# Patient Record
Sex: MALE | Race: BLACK OR AFRICAN AMERICAN | Employment: UNEMPLOYED | URBAN - METROPOLITAN AREA
[De-identification: names, ages, dates, MRNs, and addresses within clinical notes are randomized per-mention and may not be internally consistent; named-entity substitution may affect disease eponyms.]

---

## 2018-12-18 ENCOUNTER — HOSPITAL ENCOUNTER (OUTPATIENT)
Dept: LAB | Age: 68
Discharge: HOME | End: 2018-12-18

## 2018-12-18 LAB — POTASSIUM SERPL-SCNC: 4 MMOL/L (ref 3.5–5.1)

## 2018-12-18 PROCEDURE — 84132 ASSAY OF SERUM POTASSIUM: CPT

## 2019-01-31 RX ORDER — MELATONIN
1000 DAILY
COMMUNITY
End: 2019-04-30

## 2019-01-31 RX ORDER — MIDODRINE HYDROCHLORIDE 10 MG/1
10 TABLET ORAL 2 TIMES DAILY WITH MEALS
COMMUNITY
End: 2019-04-30

## 2019-01-31 RX ORDER — ATORVASTATIN CALCIUM 10 MG/1
20 TABLET, FILM COATED ORAL
COMMUNITY

## 2019-01-31 RX ORDER — ERGOCALCIFEROL 1.25 MG/1
50000 CAPSULE ORAL
COMMUNITY
End: 2019-04-30

## 2019-01-31 RX ORDER — MENTHOL AND ZINC OXIDE .44; 20.625 G/100G; G/100G
OINTMENT TOPICAL
COMMUNITY
End: 2020-09-16 | Stop reason: CLARIF

## 2019-01-31 RX ORDER — LOPERAMIDE HYDROCHLORIDE 2 MG/1
2 CAPSULE ORAL
COMMUNITY
End: 2021-01-01

## 2019-01-31 RX ORDER — DOCUSATE SODIUM 100 MG/1
100 CAPSULE, LIQUID FILLED ORAL DAILY
COMMUNITY
End: 2019-04-30

## 2019-01-31 RX ORDER — GABAPENTIN 100 MG/1
100 CAPSULE ORAL 2 TIMES DAILY
COMMUNITY
End: 2020-09-16 | Stop reason: CLARIF

## 2019-01-31 RX ORDER — SIMETHICONE 80 MG
40 TABLET,CHEWABLE ORAL 2 TIMES DAILY
COMMUNITY
End: 2020-09-16 | Stop reason: CLARIF

## 2019-01-31 RX ORDER — ONDANSETRON 4 MG/1
4 TABLET, FILM COATED ORAL
COMMUNITY
End: 2021-01-01

## 2019-01-31 RX ORDER — ACETAMINOPHEN 500 MG
1000 TABLET ORAL
COMMUNITY
End: 2021-01-01

## 2019-01-31 NOTE — PERIOP NOTES
Received med list from [facility]. Meds entered in cc. [address] pre-op instruction sheet faxed to May at [address] [place] [address]. Fax #  [phone]. Confirmation received.

## 2019-02-01 ENCOUNTER — HOSPITAL ENCOUNTER (OUTPATIENT)
Age: 69
Setting detail: OUTPATIENT SURGERY
Discharge: NURSING, REHAB OR LONG-TERM CARE | End: 2019-02-01
Payer: MEDICARE

## 2019-02-01 ENCOUNTER — ANESTHESIA EVENT (OUTPATIENT)
Dept: SURGERY | Age: 69
End: 2019-02-01
Payer: MEDICARE

## 2019-02-01 ENCOUNTER — ANESTHESIA (OUTPATIENT)
Dept: SURGERY | Age: 69
End: 2019-02-01
Payer: MEDICARE

## 2019-02-01 VITALS
SYSTOLIC BLOOD PRESSURE: 115 MMHG | DIASTOLIC BLOOD PRESSURE: 55 MMHG | HEART RATE: 79 BPM | RESPIRATION RATE: 18 BRPM | HEIGHT: 68 IN | OXYGEN SATURATION: 97 % | WEIGHT: 218 LBS | TEMPERATURE: 97.8 F | BODY MASS INDEX: 33.04 KG/M2

## 2019-02-01 DIAGNOSIS — Z99.2 STAGE 5 CHRONIC KIDNEY DISEASE ON CHRONIC DIALYSIS (HCC): Primary | ICD-10-CM

## 2019-02-01 DIAGNOSIS — N18.6 STAGE 5 CHRONIC KIDNEY DISEASE ON CHRONIC DIALYSIS (HCC): Primary | ICD-10-CM

## 2019-02-01 LAB
ANION GAP BLD CALC-SCNC: 21 MMOL/L (ref 10–20)
ATRIAL RATE: 80 BPM
BUN BLD-MCNC: 31 MG/DL (ref 9–20)
CA-I BLD-MCNC: 1 MMOL/L (ref 1.12–1.32)
CALCULATED P AXIS, ECG09: 52 DEGREES
CALCULATED R AXIS, ECG10: -26 DEGREES
CALCULATED T AXIS, ECG11: 40 DEGREES
CHLORIDE BLD-SCNC: 100 MMOL/L (ref 98–107)
CO2 BLD-SCNC: 22 MMOL/L (ref 21–32)
CREAT BLD-MCNC: 5.8 MG/DL (ref 0.6–1.3)
DIAGNOSIS, 93000: NORMAL
GLUCOSE BLD STRIP.AUTO-MCNC: 94 MG/DL (ref 65–100)
GLUCOSE BLD-MCNC: 112 MG/DL (ref 65–100)
HCT VFR BLD CALC: 37 % (ref 36.6–50.3)
P-R INTERVAL, ECG05: 188 MS
POTASSIUM BLD-SCNC: 3.3 MMOL/L (ref 3.5–5.1)
Q-T INTERVAL, ECG07: 404 MS
QRS DURATION, ECG06: 86 MS
QTC CALCULATION (BEZET), ECG08: 465 MS
SERVICE CMNT-IMP: ABNORMAL
SERVICE CMNT-IMP: NORMAL
SODIUM BLD-SCNC: 139 MMOL/L (ref 136–145)
VENTRICULAR RATE, ECG03: 80 BPM

## 2019-02-01 PROCEDURE — 77030002924 HC SUT GORTX WLGO -B

## 2019-02-01 PROCEDURE — 93005 ELECTROCARDIOGRAM TRACING: CPT

## 2019-02-01 PROCEDURE — 77030032490 HC SLV COMPR SCD KNE COVD -B

## 2019-02-01 PROCEDURE — 76010000149 HC OR TIME 1 TO 1.5 HR

## 2019-02-01 PROCEDURE — 74011250636 HC RX REV CODE- 250/636

## 2019-02-01 PROCEDURE — 77030011640 HC PAD GRND REM COVD -A

## 2019-02-01 PROCEDURE — 76210000021 HC REC RM PH II 0.5 TO 1 HR

## 2019-02-01 PROCEDURE — 77030003601 HC NDL NRV BLK BBMI -A

## 2019-02-01 PROCEDURE — 77030018846 HC SOL IRR STRL H20 ICUM -A

## 2019-02-01 PROCEDURE — 77030008467 HC STPLR SKN COVD -B

## 2019-02-01 PROCEDURE — 74011250636 HC RX REV CODE- 250/636: Performed by: ANESTHESIOLOGY

## 2019-02-01 PROCEDURE — 80047 BASIC METABLC PNL IONIZED CA: CPT

## 2019-02-01 PROCEDURE — 82962 GLUCOSE BLOOD TEST: CPT

## 2019-02-01 PROCEDURE — 77030020256 HC SOL INJ NACL 0.9%  500ML

## 2019-02-01 PROCEDURE — 77030031139 HC SUT VCRL2 J&J -A

## 2019-02-01 PROCEDURE — 77030020782 HC GWN BAIR PAWS FLX 3M -B

## 2019-02-01 PROCEDURE — C1768 GRAFT, VASCULAR: HCPCS

## 2019-02-01 PROCEDURE — 77030002996 HC SUT SLK J&J -A

## 2019-02-01 PROCEDURE — A4565 SLINGS: HCPCS

## 2019-02-01 PROCEDURE — 76060000033 HC ANESTHESIA 1 TO 1.5 HR

## 2019-02-01 PROCEDURE — 76210000006 HC OR PH I REC 0.5 TO 1 HR

## 2019-02-01 DEVICE — PROPATEN VASCULAR GRAFT TW 7MMX40CM HEPARIN
Type: IMPLANTABLE DEVICE | Site: ARM | Status: FUNCTIONAL
Brand: GORE PROPATEN VASCULAR GRAFT

## 2019-02-01 RX ORDER — PROPOFOL 10 MG/ML
INJECTION, EMULSION INTRAVENOUS
Status: DISCONTINUED | OUTPATIENT
Start: 2019-02-01 | End: 2019-02-01 | Stop reason: HOSPADM

## 2019-02-01 RX ORDER — PROPOFOL 10 MG/ML
INJECTION, EMULSION INTRAVENOUS AS NEEDED
Status: DISCONTINUED | OUTPATIENT
Start: 2019-02-01 | End: 2019-02-01 | Stop reason: HOSPADM

## 2019-02-01 RX ORDER — LIDOCAINE HYDROCHLORIDE 10 MG/ML
0.1 INJECTION, SOLUTION EPIDURAL; INFILTRATION; INTRACAUDAL; PERINEURAL AS NEEDED
Status: DISCONTINUED | OUTPATIENT
Start: 2019-02-01 | End: 2019-02-01 | Stop reason: HOSPADM

## 2019-02-01 RX ORDER — MIDAZOLAM HYDROCHLORIDE 1 MG/ML
1 INJECTION, SOLUTION INTRAMUSCULAR; INTRAVENOUS AS NEEDED
Status: DISCONTINUED | OUTPATIENT
Start: 2019-02-01 | End: 2019-02-01 | Stop reason: HOSPADM

## 2019-02-01 RX ORDER — SODIUM CHLORIDE 9 MG/ML
INJECTION, SOLUTION INTRAVENOUS
Status: DISCONTINUED | OUTPATIENT
Start: 2019-02-01 | End: 2019-02-01 | Stop reason: HOSPADM

## 2019-02-01 RX ORDER — MORPHINE SULFATE 10 MG/ML
2 INJECTION, SOLUTION INTRAMUSCULAR; INTRAVENOUS
Status: DISCONTINUED | OUTPATIENT
Start: 2019-02-01 | End: 2019-02-01 | Stop reason: HOSPADM

## 2019-02-01 RX ORDER — ROPIVACAINE HYDROCHLORIDE 5 MG/ML
INJECTION, SOLUTION EPIDURAL; INFILTRATION; PERINEURAL
Status: COMPLETED | OUTPATIENT
Start: 2019-02-01 | End: 2019-02-01

## 2019-02-01 RX ORDER — SODIUM CHLORIDE 0.9 % (FLUSH) 0.9 %
5-40 SYRINGE (ML) INJECTION AS NEEDED
Status: DISCONTINUED | OUTPATIENT
Start: 2019-02-01 | End: 2019-02-01 | Stop reason: HOSPADM

## 2019-02-01 RX ORDER — SODIUM CHLORIDE 9 MG/ML
25 INJECTION, SOLUTION INTRAVENOUS CONTINUOUS
Status: DISCONTINUED | OUTPATIENT
Start: 2019-02-01 | End: 2019-02-01 | Stop reason: HOSPADM

## 2019-02-01 RX ORDER — FENTANYL CITRATE 50 UG/ML
25 INJECTION, SOLUTION INTRAMUSCULAR; INTRAVENOUS
Status: DISCONTINUED | OUTPATIENT
Start: 2019-02-01 | End: 2019-02-01 | Stop reason: HOSPADM

## 2019-02-01 RX ORDER — SODIUM CHLORIDE, SODIUM LACTATE, POTASSIUM CHLORIDE, CALCIUM CHLORIDE 600; 310; 30; 20 MG/100ML; MG/100ML; MG/100ML; MG/100ML
125 INJECTION, SOLUTION INTRAVENOUS CONTINUOUS
Status: DISCONTINUED | OUTPATIENT
Start: 2019-02-01 | End: 2019-02-01 | Stop reason: HOSPADM

## 2019-02-01 RX ORDER — ROPIVACAINE HYDROCHLORIDE 5 MG/ML
30 INJECTION, SOLUTION EPIDURAL; INFILTRATION; PERINEURAL ONCE
Status: DISCONTINUED | OUTPATIENT
Start: 2019-02-01 | End: 2019-02-01 | Stop reason: HOSPADM

## 2019-02-01 RX ORDER — SODIUM CHLORIDE 0.9 % (FLUSH) 0.9 %
5-40 SYRINGE (ML) INJECTION EVERY 8 HOURS
Status: DISCONTINUED | OUTPATIENT
Start: 2019-02-01 | End: 2019-02-01 | Stop reason: HOSPADM

## 2019-02-01 RX ORDER — HYDROCODONE BITARTRATE AND ACETAMINOPHEN 7.5; 325 MG/1; MG/1
1 TABLET ORAL
Qty: 35 TAB | Refills: 0 | Status: SHIPPED | OUTPATIENT
Start: 2019-02-01 | End: 2019-04-30

## 2019-02-01 RX ORDER — SODIUM CHLORIDE, SODIUM LACTATE, POTASSIUM CHLORIDE, CALCIUM CHLORIDE 600; 310; 30; 20 MG/100ML; MG/100ML; MG/100ML; MG/100ML
75 INJECTION, SOLUTION INTRAVENOUS CONTINUOUS
Status: DISCONTINUED | OUTPATIENT
Start: 2019-02-01 | End: 2019-02-01 | Stop reason: HOSPADM

## 2019-02-01 RX ORDER — MIDAZOLAM HYDROCHLORIDE 1 MG/ML
0.5 INJECTION, SOLUTION INTRAMUSCULAR; INTRAVENOUS
Status: DISCONTINUED | OUTPATIENT
Start: 2019-02-01 | End: 2019-02-01 | Stop reason: HOSPADM

## 2019-02-01 RX ORDER — CEFAZOLIN SODIUM/WATER 2 G/20 ML
2 SYRINGE (ML) INTRAVENOUS
Status: COMPLETED | OUTPATIENT
Start: 2019-02-01 | End: 2019-02-01

## 2019-02-01 RX ORDER — ONDANSETRON 2 MG/ML
4 INJECTION INTRAMUSCULAR; INTRAVENOUS AS NEEDED
Status: DISCONTINUED | OUTPATIENT
Start: 2019-02-01 | End: 2019-02-01 | Stop reason: HOSPADM

## 2019-02-01 RX ORDER — FENTANYL CITRATE 50 UG/ML
50 INJECTION, SOLUTION INTRAMUSCULAR; INTRAVENOUS AS NEEDED
Status: DISCONTINUED | OUTPATIENT
Start: 2019-02-01 | End: 2019-02-01 | Stop reason: HOSPADM

## 2019-02-01 RX ADMIN — PROPOFOL 30 MCG/KG/MIN: 10 INJECTION, EMULSION INTRAVENOUS at 13:00

## 2019-02-01 RX ADMIN — ROPIVACAINE HYDROCHLORIDE 30 ML: 5 INJECTION, SOLUTION EPIDURAL; INFILTRATION; PERINEURAL at 12:14

## 2019-02-01 RX ADMIN — Medication 2 G: at 13:02

## 2019-02-01 RX ADMIN — SODIUM CHLORIDE: 9 INJECTION, SOLUTION INTRAVENOUS at 11:45

## 2019-02-01 RX ADMIN — PROPOFOL 30 MG: 10 INJECTION, EMULSION INTRAVENOUS at 13:12

## 2019-02-01 RX ADMIN — SODIUM CHLORIDE 25 ML/HR: 900 INJECTION, SOLUTION INTRAVENOUS at 11:45

## 2019-02-01 RX ADMIN — MIDAZOLAM HYDROCHLORIDE 3 MG: 1 INJECTION, SOLUTION INTRAMUSCULAR; INTRAVENOUS at 12:10

## 2019-02-01 NOTE — ANESTHESIA PREPROCEDURE EVALUATION
Anesthetic History No history of anesthetic complications Review of Systems / Medical History Patient summary reviewed, nursing notes reviewed and pertinent labs reviewed Pulmonary Within defined limits Neuro/Psych Within defined limits Cardiovascular Hypertension CAD 
 
 
  
GI/Hepatic/Renal 
Within defined limits Endo/Other Diabetes Hypothyroidism Arthritis Other Findings Physical Exam 
 
Airway Mallampati: II 
TM Distance: > 6 cm Neck ROM: normal range of motion Mouth opening: Normal 
 
 Cardiovascular Regular rate and rhythm,  S1 and S2 normal,  no murmur, click, rub, or gallop Dental 
No notable dental hx Pulmonary Breath sounds clear to auscultation Abdominal 
GI exam deferred Other Findings Anesthetic Plan ASA: 4 Anesthesia type: regional - interscalene block Induction: Intravenous Anesthetic plan and risks discussed with: Patient

## 2019-02-01 NOTE — OP NOTES
[address]  
OPERATIVE REPORT Name:FABBY Galvan 
MR#: [id number] : 1950 ACCOUNT #: [de-identified] DATE OF SERVICE: 2019 PREOPERATIVE DIAGNOSIS:  Renal failure. POSTOPERATIVE DIAGNOSIS:  Renal failure. PROCEDURE PERFORMED:  Insertion right upper arm Macon-Robert dialysis graft. SURGEON:  Angelita Schwab MD 
 
ANESTHESIA:  Regional block. ESTIMATED BLOOD LOSS:  Minimal. 
 
SPECIMENS REMOVED:  None. COMPLICATIONS:  None. IMPLANTS:  7 mm Macon-Robert graft. ASSISTANT:  Jarad Hernandez INDICATIONS:  The patient is a 23-year-old male with end-stage renal disease on hemodialysis. He has a right upper arm fistula that recently thrombosed and will be replaced with a graft. PROCEDURE:  The patient's right arm was prepped and draped. A transverse incision was made at the antecubital level where the pulsatile residual portion of the old fistula was dissected free. A longitudinal incision was then made in the proximal medial upper arm and the brachial vein was identified and dissected free. A 7 mm thin walled Macon-Robert graft was obtained and was tunneled in a straight configuration using one counter incision from the antecubital to the axilla going lateral to the previous fistula. The graft was sewn end-to-end to the venous remnant of the old fistula at the brachial artery using running CV5 Macon-Robert suture. The graft was then tunneled and then sewn end-to-side to the brachial vein using running CV5 Macon-Robert suture. Following this, there was an augmentable pulse in the graft. The incisions were closed with Vicryl subcutaneous suture and skin staples. Dressings were applied and the patient was returned to the recovery room in stable condition. MD SAJAN Hooks / [place] 
D: 2019 13:56    
T: 2019 15:23 
JOB #: [id number]

## 2019-02-01 NOTE — BRIEF OP NOTE
BRIEF OPERATIVE NOTE    Date of Procedure: 2/1/2019   Preoperative Diagnosis: ESRD  Postoperative Diagnosis: ESRD    Procedure(s):  INSERTION RIGHT UPPER ARM DIALYSIS GRAFT  Surgeon(s) and Role:     Eleazar Lockwood MD - Primary         Surgical Assistant: Christina Musa    Surgical Staff:  Circ-1: Severiano Kinney RN  Scrub RN-1: Seema Damon RN  Surg Asst-1: Keli Arnett  Event Time In Time Out   Incision Start 1308    Incision Close 1347      Anesthesia: Regional   Estimated Blood Loss: minimal  Specimens: * No specimens in log *   Findings: none   Complications: none  Implants:   Implant Name Type Inv.  Item Serial No.  Lot No. LRB No. Used Action   GRAFT TW STRTCH [id number] -- Ebenezer Matar  GRAFT TW STRTCH [id number] -- Marcelle Shaw [id number]  [employer] N/A Right 1 Implanted

## 2019-02-01 NOTE — ANESTHESIA PROCEDURE NOTES
Peripheral Block Performed by: Laci Corley MD 
Authorized by: aLci Corley MD  
 
 
Pre-procedure: Indications: at surgeon's request and post-op pain management Preanesthetic Checklist: patient identified, risks and benefits discussed, site marked, timeout performed, anesthesia consent given and patient being monitored Block Type:  
Block Type: Interscalene Monitoring:  Standard ASA monitoring, continuous pulse ox, frequent vital sign checks, heart rate, responsive to questions and oxygen Injection Technique:  Single shot Procedures: ultrasound guided and nerve stimulator Patient Position: supine Prep: betadine and povidone-iodine 7.5% surgical scrub Location:  Interscalene Needle Type:  Stimuplex Needle Gauge:  22 G Needle Localization:  Ultrasound guidance Assessment: 
Number of attempts:  1 Injection Assessment:  Incremental injection every 5 mL, local visualized surrounding nerve on ultrasound, negative aspiration for blood, no paresthesia, negative aspiration for CSF and ultrasound image on chart Patient tolerance:  Patient tolerated the procedure well with no immediate complications

## 2019-02-01 NOTE — ROUTINE PROCESS
Patient: Deya Fabian. MRN: [id number]  SSN: [id number]   YOB: 1950  Age: 77 y.o. Sex: male     Patient is status post Procedure(s):  INSERTION RIGHT UPPER ARM DIALYSIS GRAFT.     Surgeon(s) and Role:     * Sadia King MD - Primary    Local/Dose/Irrigation:                    Peripheral IV 02/01/19 Left Wrist (Active)                           Dressing/Packing:     Splint/Cast:  ]    Other:

## 2019-02-01 NOTE — DISCHARGE INSTRUCTIONS
Patient Education        Hemodialysis Access: What to Expect at [facility]  Hemodialysis is a way to remove wastes from the blood when your kidneys can no longer do the job. It is not a cure, but it can help you live longer and feel better. It is a lifesaving treatment when you have kidney failure. Hemodialysis is often called dialysis. Your doctor created a place (called an access) in your arm for your blood to flow in and out of your body during your dialysis sessions. Your arm will probably be bruised and swollen. It may hurt. The cut (incision) may bleed. The pain and bleeding will get better over several days. You will probably need only over-the-counter pain medicine. You can reduce swelling by propping your arm on 1 or 2 pillows and keeping your elbow straight. You will have stitches. These may dissolve on their own, or your doctor will tell you when to come in to have them removed. You should also be able to return to work in a few days. You may feel some coolness or numbness in your hand. These feelings usually go away in a few weeks. Your doctor may suggest squeezing a soft object. This will strengthen your access and may make hemodialysis faster and easier. You should always be able to feel blood rushing through the fistula or graft. It feels like a slight vibration when you put your fingers on the skin over the fistula or graft. This feeling is called a thrill or pulse. This care sheet gives you a general idea about how long it will take for you to recover. But each person recovers at a different pace. Follow the steps below to get better as quickly as possible. How can you care for yourself at home? Activity    Rest when you feel tired. Getting enough sleep will help you recover.  Do not lie on or sleep on the arm with the access.     Avoid activities such as washing windows or gardening that put stress on the arm with the access.     You may use your arm, but do not lift anything that weighs more than about 15 pounds. This may include a child, heavy grocery bags, a heavy briefcase or backpack, cat litter or dog food bags, or a vacuum .     You can shower, but keep the access dry for the first 2 days. Cover the area with a plastic bag to keep it dry.     Do not soak or scrub the incision until it has healed.     Wear an arm guard to protect the area if you play sports or work with your arms.     You may drive when your doctor says it is okay. This is usually in 1 to 2 days.     Most people are able to return to work about 1 or 2 days after surgery. Diet    Follow an eating plan that is good for your kidneys. A registered dietitian can help you make a meal plan that is right for you. You may need to limit protein, salt, fluids, and certain foods. Medicines    Your doctor will tell you if and when you can restart your medicines. He or she will also give you instructions about taking any new medicines.     If you take blood thinners, such as warfarin (Coumadin), clopidogrel (Plavix), or aspirin, be sure to talk to your doctor. He or she will tell you if and when to start taking those medicines again. Make sure that you understand exactly what your doctor wants you to do.     Take pain medicines exactly as directed. If the doctor gave you a prescription medicine for pain, take it as prescribed. If you are not taking a prescription pain medicine, ask your doctor if you can take acetaminophen (Tylenol). Do not take ibuprofen (Advil, Motrin) or naproxen (Aleve), or similar medicines, unless your doctor tells you to. They may make chronic kidney disease worse. Do not take two or more pain medicines at the same time unless the doctor told you to. Many pain medicines have acetaminophen, which is Tylenol. Too much acetaminophen (Tylenol) can be harmful.     If you think your pain medicine is making you sick to your stomach:   Take your medicine after meals (unless your doctor has told you not to).  Ask your doctor for a different pain medicine.     If your doctor prescribed antibiotics, take them as directed. Do not stop taking them just because you feel better. You need to take the full course of antibiotics. Incision care    Keep the area dry for 2 days. After 2 days, wash the area with soap and water every day, and always before dialysis.     Do not soak or scrub the incision until it has healed.     If you have a bandage, change it every day or as your doctor recommends. Your doctor will tell you when you can remove it. Exercise    Squeeze a soft ball or other object as your doctor tells you. This will help blood flow through the access and help prevent blood clots.    Elevation    Prop up the sore arm on a pillow anytime you sit or lie down during the next 3 days. Try to keep it above the level of your heart. This will help reduce swelling. Other instructions    Every day, check your access for a pulse or thrill in the fistula or graft area. A thrill is a vibration. To feel a pulse or thrill, place the first two fingers of your hand over the access.     Do not bump your arm.     Do not wear tight clothing, jewelry, or anything else that may squeeze the access.     Use your other arm to have blood drawn or blood pressure taken.     Do not put cream or lotion on or near the access.     Make sure all doctors you deal with know you have a vascular access. Follow-up care is a key part of your treatment and safety. Be sure to make and go to all appointments, and call your doctor if you are having problems. It's also a good idea to know your test results and keep a list of the medicines you take. When should you call for help? Call 911 anytime you think you may need emergency care.  For example, call if:    You passed out (lost consciousness).     You have chest pain, are short of breath, or cough up blood.    Call your doctor now or seek immediate medical care if:    Your hand or arm is cold or dark-colored.     You have no pulse in your access.     You have nausea or you vomit.     You have pain that does not get better after you take pain medicine.     You have loose stitches, or your incision comes open.     You are bleeding from the incision.     You have signs of infection, such as: Increased pain, swelling, warmth, or redness. Red streaks leading from the area. Pus draining from the area. A fever.     You have signs of a blood clot in your leg (called a deep vein thrombosis), such as:  Pain in your calf, back of the knee, thigh, or groin. Redness or swelling in your leg.    Watch closely for changes in your health, and be sure to contact your doctor if you have any problems. Where can you learn more? Go to http://kari-betsy.info/. Enter P616 in the search box to learn more about \"Hemodialysis Access: What to Expect at Home. \"  Current as of: 2018  Content Version: 11.9  © 0503-0149 [employer]. Care instructions adapted under license by [employer] (which disclaims liability or warranty for this information). If you have questions about a medical condition or this instruction, always ask your healthcare professional. [place] [address] any warranty or liability for your use of this information. Patient Discharge Instructions    Caroline Rosarioes / [id number] : 1950    Admitted 2019 Discharged: 2019     Take Home Medications     . · It is important that you take the medication exactly as they are prescribed. · Keep your medication in the bottles provided by the pharmacist and keep a list of the medication names, dosages, and times to be taken in your wallet. · Do not take other medications without consulting your doctor.        What to do at Home    Recommended diet: Renal Diet,     Recommended activity: Activity as tolerated,     Additional Instructions: remove dressing on Sun and leave open    Follow-up with Dr Jasson Barrera in 2 weeks, call [phone] for appt       ______________________________________________________________________    Anesthesia Discharge Instructions    After general anesthesia or intervenous sedation, for 24 hours or while taking prescription Narcotics:  · Limit your activities  · Do not drive or operate hazardous machinery  · If you have not urinated within 8 hours after discharge, please contact your surgeon on call. · Do not make important personal or business decisions  · Do not drink alcoholic beverages    Report the following to your surgeon:  · Excessive pain, swelling, redness or odor of or around the surgical area  · Temperature over 100.5 degrees  · Nausea and vomiting lasting longer than 4 hours or if unable to take medication  · Any signs of decreased circulation or nerve impairment to extremity:  Change in color, persistent numbness, tingling, coldness or increased pain. · Any questions            Information obtained by :  I understand that if any problems occur once I am at home I am to contact my physician. I understand and acknowledge receipt of the instructions indicated above.                                                                                                                                            Physician's or R.N.'s Signature                                                                  Date/Time                                                                                                                                              Patient or Representative Signature                                                          Date/Time

## 2019-11-04 ENCOUNTER — OFFICE VISIT (OUTPATIENT)
Dept: NEUROLOGY | Age: 69
End: 2019-11-04

## 2019-11-04 VITALS
SYSTOLIC BLOOD PRESSURE: 130 MMHG | OXYGEN SATURATION: 90 % | DIASTOLIC BLOOD PRESSURE: 80 MMHG | HEART RATE: 80 BPM | RESPIRATION RATE: 18 BRPM

## 2019-11-04 DIAGNOSIS — R26.81 GAIT INSTABILITY: ICD-10-CM

## 2019-11-04 DIAGNOSIS — R29.898 WEAKNESS OF BOTH LOWER EXTREMITIES: Primary | ICD-10-CM

## 2019-11-04 DIAGNOSIS — R20.9 SKIN SENSATION DISTURBANCE: ICD-10-CM

## 2019-11-04 DIAGNOSIS — R29.898 WEAKNESS OF BOTH LOWER EXTREMITIES: ICD-10-CM

## 2019-11-04 RX ORDER — FEXOFENADINE HCL 60 MG
TABLET ORAL
COMMUNITY
End: 2020-09-16 | Stop reason: CLARIF

## 2019-11-04 RX ORDER — CALCIUM CARBONATE 500(1250)
TABLET ORAL DAILY
COMMUNITY
End: 2020-09-16 | Stop reason: CLARIF

## 2019-11-04 NOTE — PROGRESS NOTES
[facility] PATIENT EVALUATION/CONSULTATION 
  
 
PATIENT NAME: Saba Mathis MRN: [id number] REASON FOR CONSULTATION: Lower extremity weakness 11/04/19 Previous records (physician notes, laboratory reports, and radiology reports) and imaging studies were reviewed and summarized. My recommendations will be communicated back to the patient's physician(s) via electronic medical record and/or by [address] [facility] mail. HISTORY OF PRESENT ILLNESS: 
Saba Mathis is a 68 y.o.  male presenting for evaluation of lower extremity weakness. Pt reports onset of lower extremity weakness x 7 months with slow progression. He cannot stand independently. His knees tend to buckle due to weakness. Sx are symmetric, constant. He was using a walker up until this past year when he started requiring the assistance of a wheelchair due to weakness. No associated LE paresthesias. He does have some LBP, chronic for several years, with radiation down both legs intermittently. He has a h/o bowel obstruction in the past s/p resection. No recent changes in bowel function. He is on HD/anuric. He does endorse occasional neck pain. Denies UE weakness/paresthesias. He has completed PT over the past year without significant benefit. PAST MEDICAL HISTORY: 
Past Medical History:  
Diagnosis Date  Arthritis  CAD (coronary artery disease)  Chronic kidney disease ESRD, [facility] [place]/T,Thurs,Sat  Diabetes ([address] type 2  
 Heart failure ([address] 2009  Hyperlipidemia  Hypertension  Legal blindness  Orthostatic hypotension  SOB (shortness of breath) O2 AT 2LPM PRN  Thyroid disease   
 hyperparathyroidism  Vitamin D deficiency PAST SURGICAL HISTORY: 
Past Surgical History:  
Procedure Laterality Date  ABDOMEN SURGERY PROC UNLISTED  11/01/2018  
 lower abdominal bowels removed  HX APPENDECTOMY  HX HEENT    
 lasik,  left optic nerve surgery  HX VASCULAR ACCESS    
 right dialysis access  VASCULAR SURGERY PROCEDURE UNLIST    
 fistula r arm, FAMILY HISTORY:  
Family History Problem Relation Age of Onset  Hypertension Mother  Diabetes Mother  Hypertension Father  Diabetes Father  Hypertension Sister SOCIAL HISTORY: 
Social History Socioeconomic History  Marital status:  Spouse name: Not on file  Number of children: Not on file  Years of education: Not on file  Highest education level: Not on file Tobacco Use  Smoking status: Former Smoker Years: 2.00  Smokeless tobacco: Never Used Substance and Sexual Activity  Alcohol use: No  
 Drug use: No  
  Types: Prescription, OTC MEDICATIONS:  
Current Outpatient Medications Medication Sig Dispense Refill  fexofenadine (ALLEGRA) 60 mg tablet Take  by mouth.  calcium carbonate (OS-RAJESH) 500 mg calcium (1,250 mg) tablet Take  by mouth daily.  lanolin alcohol/mo/w.pet/ceres (EUCERIN EX) by Apply Externally route.  bacitracin (BACITRACIN) 500 unit/gram oint Apply  to affected area as needed.  carbamide peroxide (DEBROX) 6.5 % otic solution Administer 5 Drops into each ear every seven (7) days. Indications: Ear Wax that has Hardened and May Cause Blockage  midodrine (PROAMITINE) 10 mg tablet Take 10 mg by mouth two (2) times a day. Indications: feeling dizzy upon standing from blood pressure drop  atorvastatin (LIPITOR) 10 mg tablet Take 10 mg by mouth nightly.  menthol-zinc oxide (CALMOSEPTINE) 0.44-20.6 % oint Apply  to affected area. APPLY TO BUTTOCKS TOPICALLY EVERY 8 HRS AS NEEDED FOR SKIN CARE    
 gabapentin (NEURONTIN) 100 mg capsule Take 100 mg by mouth two (2) times a day.  loperamide (IMODIUM) 2 mg capsule Take 2 mg by mouth every four (4) hours as needed for Diarrhea.  b complex-vitamin c-folic acid (NEPHROCAPS) 1 mg capsule Take 1 Cap by mouth daily.  ondansetron hcl (ZOFRAN) 4 mg tablet Take 4 mg by mouth every six (6) hours as needed for Nausea.  phenyleph/pramoxin/glycr/w.pet (PREPARATION H MAXIMUM STRENGTH RE) Insert  into rectum. PREPARATION H CREAM 5-14/4%. INSERT 1 APPLICATION RECTALLY EVERY 4 HRS AS NEEDED FOR HEMORROIDS.  simethicone (MYLICON) 80 mg chewable tablet Take 40 mg by mouth two (2) times a day.  TUCKS, WITCH HAZEL, EX by Apply Externally route as needed. APPLY  TO RECTAL AREA TOPICALLY AY EVERY 8 HRS AS NEEDED FOR HEMORROIDS  acetaminophen (TYLENOL EXTRA STRENGTH) 500 mg tablet Take 1,000 mg by mouth every six (6) hours as needed for Pain.  glucose blood VI test strips (ONE TOUCH ULTRA TEST) strip PT IS TO CHECK HIS BLOOD SUGAR TWICE DAILY. 100 Strip 11  Lancets Misc Pt is to check his blood sugar twice daily. 100 Each 11  
 aspirin 81 mg tablet Take 81 mg by mouth daily.  Blood-Glucose Meter (ONE TOUCH ULTRA 2) monitoring kit by Does Not Apply route. ALLERGIES: 
Allergies Allergen Reactions  Adhesive Tape-Silicones Itching Itchiness REVIEW OF SYSTEMS: 
10 point ROS reviewed with patient. Please see scanned document under media. PHYSICAL EXAM: 
Vital Signs:  
Visit Vitals /80 Pulse 80 Resp 18 SpO2 90% General Medical Exam: 
General:  Well appearing, comfortable, in no apparent distress. Eyes/ENT: see cranial nerve examination. Neck: No masses appreciated. Full range of motion without tenderness. Respiratory:  Clear to auscultation, good air entry bilaterally. Cardiac:  Regular rate and rhythm, no murmur. GI:  Soft, non-tender, non-distended abdomen. Bowel sounds normal. No masses, organomegaly. Extremities:  No deformities, edema, or skin discoloration. Skin:  No rashes or lesions. Neurological: · Mental Status:  Alert and oriented to person, place, and time with fluent speech. · Cranial Nerves:  
CNII/III/IV/VI: Baseline blindness, EOMI, Pupils 0.5mm reactive OD, irregular NR OS, no ptosis or nystagmus. CN V: Facial sensation intact bilaterally, masseter 5/5  
CN VII: Facial muscles symmetric and strong CN VIII: Hears finger rub well bilaterally, intact vestibular function CN IX/X: Normal palatal movement CN XI: Full strength shoulder shrug bilaterally CN XII: Tongue protrusion full and midline without fasciculation or atrophy · Motor: Normal tone and muscle bulk with no pronator drift. Individual muscle group testing: 
Shoulder abduction:   Left:5/5   Right : 5-/5  (limited due to R shoulder pain) Shoulder adduction:   Left:5/5   Right : 5/5 Elbow flexion:      Left:5/5   Right : 5/5 Elbow extension:    Left:5/5   Right : 5/5 Wrist flexion:    Left:5/5   Right : 5/5 Wrist extension:    Left:5/5   Right : 5/5 Arm pronation:   Left:5/5   Right : 5/5 Arm supination:   Left:5/5   Right : 5/5 Finger flexion:    Left:5/5   Right : 5/5 Finger extension:   Left:5/5   Right : 5/5 Finger abduction:  Left:4+/5   Right : 4+/5 Finger adduction:   Left:5/5   Right : 5/5 Hip flexion:     Left:5/5   Right : 5/5 Hip extension:   Left:5/5   Right : 5/5 Knee flexion:    Left:5/5   Right : 5/5 Knee extension:   Left:5/5   Right : 5/5 Dorsiflexion:     Left:5/5   Right : 5/5 Plantar flexion:    Left:5/5   Right : 5/5 · MSRs: No crossed adductors or clonus. RIGHT  LEFT Brachioradialis 1+ 1+ Biceps 1+ 1+ Triceps 1+ 1+ Knee 1+ 1+ Achilles 0 0 Plantar response Downward Downward · Sensation: Decreased vibration to ankles b/l, otherwise intact pinprick, temperature, light touch, proprioception · Coordination: No dysmetria. Normal rapid alternating movements; finger-to-nose and heel-to- shin testing are within normal limits. · Gait: Unsteady, requires walker or wheelchair to assist 
 
PERTINENT DATA: 
INTERNAL RECORDS:  The patient's electronic medical record was reviewed. The relevant details include: MRI Results (maximum last 3): Results from Hospital Encounter encounter on 09/17/10 MRI SPINE CERVICAL WITHOUT CONTRAST Narrative Final Report ICD Codes / Adm. Diagnosis:    /   DISTURBANCE OF SKIN SENSATION Lizbeth Sanchez CON  - [id number] - Sep 17 2010  8:33AM 
Accession No:  [id number] Reason:  DISTURBANCE OF SKIN SENSATION 
 
 
REPORT: 
Clinical History: Disturbance of sensation, left hand numbness since April. Renal failure, dialysis Findings: Sagittal T1-weighted FLAIR, sagittal STIR and T2-weighted fast  
spin-echo, axial T1-weighted spin-echo, axial T2-weighted fast spin-echo and  
axial gradient echo images are obtained. Cord signal is normal and the visualized portions of the brainstem and  
cerebellum appear normal. There is normal vertebral body height and bone  
signal. There is mild retrolisthesis at C3-C4 with otherwise anatomic  
alignment. No paraspinal soft tissue mass is demonstrated. There is no substantial disc or facet abnormality  at C2-C3. C3-C4 shows moderate disc space narrowing with mild diffuse disc osteophyte  
complex formation. Anterior-posterior canal dimension measures 8mm. There is  
mild to moderate bilateral foraminal narrowing. C4-C5 shows nearly normal disc height with mild leftward lateralizing  
diffuse disc bulging. There is no facet arthropathy, canal stenosis or right  
foraminal stenosis. There is perhaps a mild degree of left foraminal  
stenosis. C5-C6 shows a nearly normal disc height with demonstration of the mild left  
paracentral and lateral disc protrusion which minimally indents the left  
anterolateral cord. There is no facet arthropathy or canal stenosis. There  
is moderate left foraminal stenosis. C6-C7 shows mildly diminished disc height with minimal disc bulging and  
superimposed moderate sized left lateral and foraminal disc protrusion. There is no substantial facet arthropathy. There is no midline canal  
stenosis. Moderate left foraminal stenosis is shown. C7-T1 disc and facets are normal. 
 
There is mild right paracentral through foraminal disc bulging at T1-T2 with  
mild right foraminal stenosis. T2-T3 shows mild diffuse disc bulging with  
mild right foraminal narrowing IMPRESSION:  
1. Cord signal normal. 
2. Multilevel degenerative changes with mild canal stenosis at C3-C4. Left  
lateral through foraminal disc protrusion at C5-C6 and left lateral through  
foraminal disc protrusion at C6-C7. Interpreting/Reading Doctor: Sree Cross ([id number]) Transcribed: n/a on 09/17/2010 Approved: ESPERANZA Cross ([id number])  09/17/2010 Distribution:  Attending Doctor: April Campbell Alternate Doctor: April Campbell 
 
   
 
 
ASSESSMENT:   
  ICD-10-CM ICD-9-CM 1. Weakness of both lower extremities R29.898 729.89 EMG LIMITED MRI LUMB SPINE WO CONT  
   VITAMIN B12  
   TSH AND FREE T4  
   GAMMOPATHY EVAL, SPEP/MANN, IG QT/FLC 2. Gait instability R26.81 781. 2 EMG LIMITED MRI LUMB SPINE WO CONT  
   VITAMIN B12  
   TSH AND FREE T4  
   GAMMOPATHY EVAL, SPEP/MANN, IG QT/FLC 3. Skin sensation disturbance R20.9 782.0 EMG LIMITED MRI LUMB SPINE WO CONT  
   VITAMIN B12  
   TSH AND FREE T4  
   GAMMOPATHY EVAL, SPEP/MANN, IG QT/FLC  
68year old AAM presenting with gradual onset slowly progressive lower extremity weakness. He has required a walker for over a year to assist with ambulation and wheelchair more recently over the past 12 months due to gait instability. He also endorses rather chronic LBP with occasional radicular symptoms.   It appears he is having more gait instability than lacy weakness of the lower limbs on examination today. This is likely due to a combination of peripheral sensorimotor neuropathy as evident on examination as well as possible lumbar pathology. Risk factors for neuropathy include h/o diabetes as well as reported parathyroid disorder. Will proceed with lumbar imaging to assess for any significant lumbar canal stenosis or other pathology. Electrodiagnostic studies will be completed to determine extent of his polyneuropathy and exclude comorbid lumbar radiculopathy. He may benefit from further physical therapy after studies are completed. PLAN: 
· MRI Lumbar WO 
· EMG PN protocol · B12, TSH, MANN 
· F/U 1-2 weeks after above testing is completed I have discussed the diagnosis with the patient and the intended plan as seen in the above orders. Patient is in agreement. The patient has received an after-visit summary and questions were answered concerning future plans. Jd Phillips DO Staff Neurologist 
Diplomate, [facility] Board of Psychiatry & Neurology

## 2019-11-04 NOTE — PATIENT INSTRUCTIONS
PRESCRIPTION REFILL POLICY [facility] Statement to Patients April 1, 2014 In an effort to ensure the large volume of patient prescription refills is processed in the most efficient and expeditious manner, we are asking our patients to assist us by calling your Pharmacy for all prescription refills, this will include also your  Mail Order Pharmacy. The pharmacy will contact our office electronically to continue the refill process. Please do not wait until the last minute to call your pharmacy. We need at least 48 hours (2days) to fill prescriptions. We also encourage you to call your pharmacy before going to  your prescription to make sure it is ready. With regard to controlled substance prescription refill requests (narcotic refills) that need to be picked up at our office, we ask your cooperation by providing us with at least 72 hours (3days) notice that you will need a refill. We will not refill narcotic prescription refill requests after 4:00pm on any weekday, Monday through Thursday, or after 2:00pm on Fridays, or on the weekends. We encourage everyone to explore another way of getting your prescription refill request processed using [employer], our patient web portal through our electronic medical record system. [employer] is an efficient and effective way to communicate your medication request directly to the office and  downloadable as an zachery on your smart phone . [employer] also features a review functionality that allows you to view your medication list as well as leave messages for your physician. Are you ready to get connected? If so please review the attatched instructions or speak to any of our staff to get you set up right away! Thank you so much for your cooperation. Should you have any questions please contact our Practice Administrator. The Physicians and Staff,  [facility]

## 2019-11-09 LAB
ALBUMIN SERPL ELPH-MCNC: 3.2 G/DL (ref 2.9–4.4)
ALBUMIN/GLOB SERPL: 1 {RATIO} (ref 0.7–1.7)
ALPHA1 GLOB SERPL ELPH-MCNC: 0.2 G/DL (ref 0–0.4)
ALPHA2 GLOB SERPL ELPH-MCNC: 0.8 G/DL (ref 0.4–1)
B-GLOBULIN SERPL ELPH-MCNC: 1 G/DL (ref 0.7–1.3)
GAMMA GLOB SERPL ELPH-MCNC: 1.5 G/DL (ref 0.4–1.8)
GLOBULIN SER-MCNC: 3.5 G/DL (ref 2.2–3.9)
IGA SERPL-MCNC: 309 MG/DL (ref 61–437)
IGG SERPL-MCNC: 1623 MG/DL (ref 700–1600)
IGM SERPL-MCNC: 44 MG/DL (ref 15–143)
INTERPRETATION SERPL IEP-IMP: ABNORMAL
KAPPA LC FREE SER-MCNC: 286 MG/L (ref 3.3–19.4)
KAPPA LC FREE/LAMBDA FREE SER: 1.69 {RATIO} (ref 0.26–1.65)
LAMBDA LC FREE SERPL-MCNC: 169.6 MG/L (ref 5.7–26.3)
M PROTEIN SERPL ELPH-MCNC: ABNORMAL G/DL
PLEASE NOTE:, 149534: ABNORMAL
PROT SERPL-MCNC: 6.7 G/DL (ref 6–8.5)
T4 FREE SERPL-MCNC: 0.55 NG/DL (ref 0.82–1.77)
TSH SERPL DL<=0.005 MIU/L-ACNC: 3.01 UIU/ML (ref 0.45–4.5)
VIT B12 SERPL-MCNC: 258 PG/ML (ref 232–1245)

## 2019-11-15 ENCOUNTER — HOSPITAL ENCOUNTER (OUTPATIENT)
Dept: MRI IMAGING | Age: 69
Discharge: HOME | End: 2019-11-15
Attending: PSYCHIATRY & NEUROLOGY
Payer: MEDICARE

## 2019-11-15 DIAGNOSIS — R29.898 WEAKNESS OF BOTH LOWER EXTREMITIES: ICD-10-CM

## 2019-11-15 DIAGNOSIS — R20.9 SKIN SENSATION DISTURBANCE: ICD-10-CM

## 2019-11-15 DIAGNOSIS — R26.81 GAIT INSTABILITY: ICD-10-CM

## 2019-11-15 PROCEDURE — 72148 MRI LUMBAR SPINE W/O DYE: CPT

## 2019-11-26 ENCOUNTER — TELEPHONE (OUTPATIENT)
Dept: NEUROLOGY | Age: 69
End: 2019-11-26

## 2019-11-26 NOTE — TELEPHONE ENCOUNTER
Called, left vm for pt to return call to office.   Left message that pt's appointment on 11/27/19 at 2:00pm is canceled

## 2019-12-19 NOTE — TELEPHONE ENCOUNTER
----- Message from Kvng Noble sent at 11/26/2019 11:44 AM EST -----  Regarding: Dr. Elda Peters Message/Vendor Calls    Caller's first and last name: Augustus DE LA GARZA [facility])      Reason for call: Requesting a call back concerning instruction for pt's EMG for 11/27/19      Callback required yes/no and why: Yes      Best contact number(s):[phone]      Details to clarify the request:      Kvng Noble
Left message for patient that his f/u was cancelled. To please call back to r/s.
No

## 2020-02-03 ENCOUNTER — TELEPHONE (OUTPATIENT)
Dept: NEUROLOGY | Age: 70
End: 2020-02-03

## 2020-02-03 NOTE — TELEPHONE ENCOUNTER
Left message for patient/friend that he needs EMG done first with  before follow up. To call back to schedule/reschedule.

## 2020-02-04 ENCOUNTER — TELEPHONE (OUTPATIENT)
Dept: NEUROLOGY | Age: 70
End: 2020-02-04

## 2020-02-04 NOTE — TELEPHONE ENCOUNTER
----- Message from Doreen Novoa sent at 2/3/2020  1:54 PM EST -----  Regarding: dr richard/ telephone      General Message/Vendor Calls    Caller's first and last name: Ryan Zev from [facility]       Reason for call: to find out if the pt needs to r/s his appt on 2/5 since the emg hasnt been completed       Callback required yes/no and why: yes      Best contact number(s): [phone]      Details to clarify the request: ask for michelle or shaun Purdy

## 2020-02-26 ENCOUNTER — TELEPHONE (OUTPATIENT)
Dept: NEUROLOGY | Age: 70
End: 2020-02-26

## 2020-02-26 NOTE — TELEPHONE ENCOUNTER
Stacey calling to r/s pt's EMG. Stated that his next follow up appt is on 3/11 and that is suppose to be a f/u after testing. They would like to have the EMG done before 3/11. Please advise. Message forwarded to nurse and EMG tech.

## 2020-02-27 NOTE — TELEPHONE ENCOUNTER
----- Message from Rachel Plater sent at 2/27/2020 12:47 PM EST -----  Regarding: Dr. Denise Li Message/Vendor Calls    Caller's first and last name:Corinne([facility])      Reason for call:nurse call back regarding appt on 03/11/20      Callback required yes/no and why:yes      Best contact number(s):[phone]      Details to clarify the request:Corinne stated pt's EMG appt was canceled on 02/26/20, and would like to know if pt should keep the appt for 03/11/20 for a f/up on the EMG.       Rachel Plater

## 2020-03-02 NOTE — TELEPHONE ENCOUNTER
Called, spoke to Cee Carter was informed Pt has EMG appt on 3/10/20 at 3:00pm and keep 3/11/20 follow up

## 2020-03-10 ENCOUNTER — OFFICE VISIT (OUTPATIENT)
Dept: NEUROLOGY | Age: 70
End: 2020-03-10

## 2020-03-10 VITALS
OXYGEN SATURATION: 98 % | BODY MASS INDEX: 33.04 KG/M2 | SYSTOLIC BLOOD PRESSURE: 123 MMHG | HEIGHT: 68 IN | DIASTOLIC BLOOD PRESSURE: 86 MMHG | HEART RATE: 78 BPM | WEIGHT: 218 LBS | RESPIRATION RATE: 16 BRPM

## 2020-03-10 DIAGNOSIS — G60.3 IDIOPATHIC PROGRESSIVE NEUROPATHY: Primary | ICD-10-CM

## 2020-03-10 NOTE — PROGRESS NOTES
[facility]  [address] [facility], [address] [place] [address]  Phone [phone] Fax [phone]  Test Date:  3/10/2020    Patient: Alexandria Navarrete : 1950 Physician: David Lauren MD   Sex: Male Height: 5' 8\" Ref Phys: Ricardo Aguilar. Riaz Boswell, DO   ID#: [id number] Weight: 218 lbs. Technician: Salomón Sofia     Patient Complaints:    Progressive bilateral weakness for 7-8 months, worse in lower extremities. Patient History / Exam:    Mr. Heike Mcdonald is a pleasant 68year old RH dominant male with history of hemodialysis and diabetes mellitus who presented for evaluation of lower extremity weakness for the past 7-8 months. He is referred to EMG laboratory to evaluate for peripheral neuropathy, polyradiculopathy. NCV & EMG Findings:  Sensory nerve conduction studies (NCSs) of the left median & ulnar nerves demonstrated absent responses. Sensory NCS of left radial nerve demonstrated normal peak latency and amplitude. Sensory NCSs of the sural and superficial peroneal nerves demonstrated absent responses bilaterally. Motor NCS of the left median nerve demonstrated moderate onset latency prolongation & mild conduction velocity slowing with normal amplitude. Motor NCS of the left ulnar nerve demonstrated moderate onset latency prolongation and mildly reduced amplitude distally with severely reduced amplitude from the forearm proximally. Severe conduction velocity slowing is noted. Motor NCSs of the tibial and peroneal nerves demonstrate absent responses bilaterally. Median to ulnar sensory comparison NCS in the left upper extremity is uninterruptable given absent median/ulnar responses. F-waves were unobtainable from tibial nerves bilaterally, while F-wave latency from the left ulnar nerve was unremarkable.      Electromyography (EMG) of the left medial gastronemius, vastus lateralis, FDI, triceps and deltoid revealed chronic reinnervation changes as manifest by large-amplitude, polyphasic motor units with prolonged duration. EMG of the left tibialis anterior revealed evidence for both active denervation changes as evidenced by increased insertional activity as well as chronic reinnervation changes manifest as large-amplitude, polyphasic motor units prolonged in duration. 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 While the findings in the left ulnar nerve would suggest conduction block in the forearm, technical factors are not excluded, particularly given absence of F-wave prolongation here. Limitations: Towards the end of the test, patient's tolerance for ongoing evaluation was visibly thin. As such, confirmation of findings in left ulnar nerve, evaluation of motor responses in right upper extremity were unable to be completed. As such, reconfirmation of left ulnar nerve finding, evaluation of motor responses in right upper extremity may need to be considered. Furthermore, study of paraspinal muscles was limited by patient's inability to sit upright with tilting over.     ___________________________  Emre Andrews MD        Nerve Conduction Studies  Anti Sensory Summary Table     Stim Site NR Peak (ms) Norm Peak (ms) P-T Amp (µV) Norm P-T Amp Onset (ms) Site1 Site2 Delta-P (ms) Dist (cm) Ramy (m/s) Norm Ramy (m/s)   Left Median Anti Sensory (2nd Digit)  25.8°C   Wrist NR  <3.6  >10  Wrist 2nd Digit  14.0  >39   Left Radial Anti Sensory (Base 1st Digit)  25.9°C   Wrist    2.9 <3.1 17.1  2.3 Wrist Base 1st Digit 2.9 0.0     Left Sup Peroneal Anti Sensory (Ant Lat Mall)  29.9°C   14 cm NR  <4.4  >5.0  14 cm Ant Lat Mall  14.0  >32   Right Sup Peroneal Anti Sensory (Ant Lat Mall)  30.1°C   14 cm NR  <4.4  >5.0  14 cm Ant Lat Mall  14.0  >32   Left Sural Anti Sensory (Lat Mall)  30°C   Calf NR  <4.0  >5.0  Calf Lat Mall  14.0  >35   Right Sural Anti Sensory (Lat Mall)  30.4°C   Calf NR  <4.0  >5.0  Calf Lat Mall  14.0  >35   Left Ulnar Anti Sensory (5th Digit)  25.9°C   Wrist NR  <3.7  >15.0  Wrist 5th Digit  14.0  >38     Motor Summary Table     Stim Site NR Onset (ms) Norm Onset (ms) O-P Amp (mV) Norm O-P Amp Site1 Site2 Delta-0 (ms) Dist (cm) Ramy (m/s) Norm Ramy (m/s)   Left Median Motor (Abd Poll Brev)  25.7°C   Wrist    5.1 <4.2 7.5 >5 Elbow Wrist 5.4 23.0 43 >50   Elbow    10.5  6.0          Left Peroneal Motor (Ext Dig Brev)  29.2°C   Ankle NR  <6.1  >2.5 B Fib Ankle  0.0  >38   B Fib NR     Poplt B Fib  0.0  >40   Poplt NR             Right Peroneal Motor (Ext Dig Brev)  29.7°C   Ankle NR  <6.1  >2.5 B Fib Ankle  0.0  >38   B Fib NR     Poplt B Fib  0.0  >40   Poplt NR             Left Tibial Motor (Abd Eubanks Brev)  29.5°C   Ankle NR  <6.1  >3.0 Knee Ankle  0.0  >35   Knee NR             Right Tibial Motor (Abd Eubanks Brev)  29.8°C   Ankle NR  <6.1  >3.0 Knee Ankle  0.0  >35   Knee NR             Left Ulnar Motor (Abd Dig Minimi)  26.2°C   Wrist    5.2 <4.2 2.8 >3 B Elbow Wrist 8.6 20.0 23 >53   B Elbow    13.8  0.2  A Elbow B Elbow 5.8 10.0 17 >53   A Elbow    19.6  0.4            Comparison Summary Table     Stim Site NR Peak (ms) Norm Peak (ms) P-T Amp (µV) Site1 Site2 Delta-P (ms) Norm Delta (ms)   Left Median/Ulnar Palm Comparison (Wrist - 8cm)  26°C   Median Palm NR  <2.5  Median Palm Ulnar Palm  <0.3   Ulnar Palm NR  <2.5          F Wave Studies     NR F-Lat (ms) Lat Norm (ms) L-R F-Lat (ms) L-R Lat Norm   Left Tibial (Mrkrs) (Abd Hallucis)  29.7°C   NR  <61  <5.7   Right Tibial (Mrkrs) (Abd Hallucis)  30°C   NR  <61  <5.7   Left Ulnar (Mrkrs) (Abd Dig Min)  26.2°C      29.31 <36  <2.5     EMG     Side Muscle Nerve Root Ins Act Fibs Psw Amp Dur Poly Recrt   Left AntTibialis Dp Br Peronel L4-5 Incr Nml Nml Incr >12ms 1+ Reduced   Left Gastroc Tibial S1-2 Nml Nml Nml Incr >12ms 1+ Reduced   Left VastusLat Femoral L2-4 Nml Nml Nml Incr >12ms 1+ Reduced Left 1stDorInt Ulnar C8-T1 Nml Nml Nml Incr > 12ms 1+ Reduced   Left Triceps Radial C6-7-8 Nml Nml Nml Incr > 12ms 1+ Reduced   Left Deltoid Axillary C5-6 Nml Nml Nml Incr > 12ms 0 Reduced         Waveforms:

## 2020-03-11 ENCOUNTER — OFFICE VISIT (OUTPATIENT)
Dept: NEUROLOGY | Age: 70
End: 2020-03-11

## 2020-03-11 VITALS
BODY MASS INDEX: 32.39 KG/M2 | HEART RATE: 87 BPM | RESPIRATION RATE: 18 BRPM | OXYGEN SATURATION: 92 % | WEIGHT: 213 LBS | DIASTOLIC BLOOD PRESSURE: 68 MMHG | SYSTOLIC BLOOD PRESSURE: 120 MMHG

## 2020-03-11 DIAGNOSIS — R26.81 GAIT INSTABILITY: ICD-10-CM

## 2020-03-11 DIAGNOSIS — G62.9 POLYNEUROPATHY: ICD-10-CM

## 2020-03-11 DIAGNOSIS — M48.061 NEUROFORAMINAL STENOSIS OF LUMBAR SPINE: Primary | ICD-10-CM

## 2020-03-11 NOTE — PROGRESS NOTES
Neurology Clinic Follow up Note Patient ID: Joanne Blade. 
[id number] 
73 y.o. 
1950 Mr. Milind Grider is here for follow up today of LE weakness. Last Appointment With Me: 
11/4/2019 \"74 y.o.  male presenting for evaluation of lower extremity weakness. Pt reports onset of lower extremity weakness x 7 months with slow progression. He cannot stand independently. His knees tend to buckle due to weakness. Sx are symmetric, constant. He was using a walker up until this past year when he started requiring the assistance of a wheelchair due to weakness. No associated LE paresthesias. He does have some LBP, chronic for several years, with radiation down both legs intermittently. He has a h/o bowel obstruction in the past s/p resection. No recent changes in bowel function. He is on HD/anuric. He does endorse occasional neck pain. Denies UE weakness/paresthesias. He has completed PT over the past year without significant benefit. \" Interval History: He reports intermittent LBP with occasional give-away weakness into the legs. He notes occasional tingling into the feet and ongoing imbalance. He is still using his wheelchair primarily due to gait instability. He is taking tramadol for his back pain currently which is helpful. PMHx/ PSHx/ FHx/ SHx:  Reviewed and unchanged previous visit. Past Medical History:  
Diagnosis Date  Arthritis  CAD (coronary artery disease)  Chronic kidney disease ESRD, [facility] [place]/T,Thurs,Sat  Diabetes ([facility] [address] type 2  
 Heart failure ([facility] [address] 2009  Hyperlipidemia  Hypertension  Legal blindness  Orthostatic hypotension  SOB (shortness of breath) O2 AT 2LPM PRN  Thyroid disease   
 hyperparathyroidism  Vitamin D deficiency ROS: 
Comprehensive review of systems negative except for as noted above. Objective:  
 
 
Meds: 
Current Outpatient Medications Medication Sig Dispense Refill  tramadol HCl (TRAMADOL PO) Take  by mouth.  fexofenadine (ALLEGRA) 60 mg tablet Take  by mouth.  calcium carbonate (OS-RAJESH) 500 mg calcium (1,250 mg) tablet Take  by mouth daily.  lanolin alcohol/mo/w.pet/ceres (EUCERIN EX) by Apply Externally route.  bacitracin (BACITRACIN) 500 unit/gram oint Apply  to affected area as needed.  carbamide peroxide (DEBROX) 6.5 % otic solution Administer 5 Drops into each ear every seven (7) days. Indications: Ear Wax that has Hardened and May Cause Blockage  midodrine (PROAMITINE) 10 mg tablet Take 10 mg by mouth two (2) times a day. Indications: feeling dizzy upon standing from blood pressure drop  atorvastatin (LIPITOR) 10 mg tablet Take 10 mg by mouth nightly.  menthol-zinc oxide (CALMOSEPTINE) 0.44-20.6 % oint Apply  to affected area. APPLY TO BUTTOCKS TOPICALLY EVERY 8 HRS AS NEEDED FOR SKIN CARE    
 gabapentin (NEURONTIN) 100 mg capsule Take 100 mg by mouth two (2) times a day.  loperamide (IMODIUM) 2 mg capsule Take 2 mg by mouth every four (4) hours as needed for Diarrhea.  b complex-vitamin c-folic acid (NEPHROCAPS) 1 mg capsule Take 1 Cap by mouth daily.  ondansetron hcl (ZOFRAN) 4 mg tablet Take 4 mg by mouth every six (6) hours as needed for Nausea.  phenyleph/pramoxin/glycr/w.pet (PREPARATION H MAXIMUM STRENGTH RE) Insert  into rectum. PREPARATION H CREAM 5-14/4%. INSERT 1 APPLICATION RECTALLY EVERY 4 HRS AS NEEDED FOR HEMORROIDS.  simethicone (MYLICON) 80 mg chewable tablet Take 40 mg by mouth two (2) times a day.  TUCKS, WITCH HAZEL, EX by Apply Externally route as needed. APPLY  TO RECTAL AREA TOPICALLY AY EVERY 8 HRS AS NEEDED FOR HEMORROIDS  acetaminophen (TYLENOL EXTRA STRENGTH) 500 mg tablet Take 1,000 mg by mouth every six (6) hours as needed for Pain.     
 glucose blood VI test strips (ONE TOUCH ULTRA TEST) strip PT IS TO CHECK HIS BLOOD SUGAR TWICE DAILY. 100 Strip 11  Lancets [facility] Pt is to check his blood sugar twice daily. 100 Each 11  
 aspirin 81 mg tablet Take 81 mg by mouth daily.  Blood-Glucose Meter (ONE TOUCH ULTRA 2) monitoring kit by Does Not Apply route. Exam: 
Visit Vitals /68 Pulse 87 Resp 18 Wt 96.6 kg (213 lb) SpO2 92% BMI 32.39 kg/m² Neurological: · Mental Status:  Alert and oriented to person, place, and time with fluent speech. · Cranial Nerves:  
CNII/III/IV/VI: Baseline blindness, EOMI, Pupils 0.5mm reactive OD, irregular NR OS, no ptosis or nystagmus. CN V: Facial sensation intact bilaterally, masseter 5/5  
CN VII: Facial muscles symmetric and strong CN VIII: Hears finger rub well bilaterally, intact vestibular function CN IX/X: Normal palatal movement CN XI: Full strength shoulder shrug bilaterally CN XII: Tongue protrusion full and midline without fasciculation or atrophy · Motor: Normal tone and muscle bulk with no pronator drift. Individual muscle group testing: 
Shoulder abduction:   Left:5/5   Right : 5-/5  (limited due to R shoulder pain) Shoulder adduction:   Left:5/5   Right : 5/5 Elbow flexion:      Left:5/5   Right : 5/5 Elbow extension:    Left:5/5   Right : 5/5 Wrist flexion:    Left:5/5   Right : 5/5 Wrist extension:    Left:5/5   Right : 5/5 Arm pronation:   Left:5/5   Right : 5/5 Arm supination:   Left:5/5   Right : 5/5 Finger flexion:    Left:5/5   Right : 5/5 Finger extension:   Left:5/5   Right : 5/5 Finger abduction:  Left:4+/5   Right : 4+/5 Finger adduction:   Left:5/5   Right : 5/5 Hip flexion:     Left:5/5   Right : 5/5 Hip extension:   Left:5/5   Right : 5/5 Knee flexion:    Left:5/5   Right : 5/5 Knee extension:   Left:5/5   Right : 5/5 Dorsiflexion:     Left:5/5   Right : 5/5 Plantar flexion:    Left:5/5   Right : 5/5 · MSRs: No crossed adductors or clonus.      
 
 RIGHT  LEFT  
 Brachioradialis 1+ 1+ Biceps 1+ 1+ Triceps 1+ 1+ Knee 1+ 1+ Achilles 0 0 Plantar response Downward Downward · Sensation: Decreased vibration to ankles b/l, otherwise intact pinprick, temperature, light touch, proprioception · Coordination: No dysmetria. Normal rapid alternating movements; finger-to-nose and heel-to- shin testing are within normal limits. · Gait: Unsteady, requires walker or wheelchair to assist 
LABS Results for orders placed or performed in visit on 11/04/19 VITAMIN B12 Result Value Ref Range Vitamin B12 258 232 - 1,245 pg/mL TSH AND FREE T4 Result Value Ref Range TSH 3.010 0.450 - 4.500 uIU/mL T4, Free 0.55 (L) 0.82 - 1.77 ng/dL GAMMOPATHY EVAL, SPEP/MANN, IG QT/FLC Result Value Ref Range Immunoglobulin G, Qt. 1,623 (H) 700 - 1,600 mg/dL Immunoglobulin A, Qt. 309 61 - 437 mg/dL Immunoglobulin M, Qt. 44 15 - 143 mg/dL Protein, total 6.7 6.0 - 8.5 g/dL Albumin 3.2 2.9 - 4.4 g/dL Alpha-1-Globulin 0.2 0.0 - 0.4 g/dL Alpha-2-Globulin 0.8 0.4 - 1.0 g/dL Beta Globulin 1.0 0.7 - 1.3 g/dL Gamma Globulin 1.5 0.4 - 1.8 g/dL M-Brian Not Observed Not Observed g/dL Globulin, total 3.5 2.2 - 3.9 g/dL A/G ratio 1.0 0.7 - 1.7 Immunofixation Result Comment Please note Comment Free Kappa Lt Chains, serum 286.0 (H) 3.3 - 19.4 mg/L Free Lambda Lt Chains, serum 169.6 (H) 5.7 - 26.3 mg/L Kappa/Lambda ratio, serum 1.69 (H) 0.26 - 1.65 IMAGING: 
MRI Results (most recent): 
Results from Hospital Encounter encounter on 11/15/19 MRI LUMB SPINE WO CONT Narrative EXAM: MRI LUMB SPINE WO CONT INDICATION: LBP, radicular pain involving b/l LE, LE weakness. R 29.898, RR 
26.81, R 20.9. Symptoms over last 9 months. COMPARISON: None TECHNIQUE: MR imaging of the lumbar spine was performed using the following 
sequences: sagittal T1, T2, STIR;  axial T1, T2.  
 
CONTRAST:  None.  
 
FINDINGS: 
 
 Conus position, morphology and signal appear normal. 
 
There is diffusely altered marrow signal which should be correlated clinically 
for its various potential causes, including smoking, obesity, and various 
anemias and marrow replacement disorders. Vertebral body heights are normal. Prominent superior endplate Schmorl's nodes 
are shown superiorly at L2 and L3 with small Schmorl's nodes at L1 and L4. There 
is a mild-moderate levoconvex lumbar curve centered at L4. There is 
straightening of lumbar lordosis. 1 mm retrolisthesis is shown at L1-2 and L2-3, 
2 mm retrolisthesis at L3-4, and up to 3 mm anterolisthesis at L4-5. No 
paraspinal soft tissue mass is shown. Innumerable cysts are shown in small 
kidneys bilaterally, with cysts measuring up to 1.7 cm in size. The visualized 
superior portions of the sacrum and SI joints show mild right SI osteoarthrosis. T11-12: Shown on sagittal images only. Moderate disc space narrowing with mild 
type II discogenic endplate signal changes and mild diffuse disc bulging without 
canal or foraminal stenosis. T12-L1: Mild disc space narrowing and diffuse disc bulging. No substantial facet 
arthrosis. No canal or foraminal stenosis. L1-L2: Mild disc space narrowing and diffuse disc bulging. No substantial facet 
arthrosis. No canal or foraminal stenosis. L2-L3: Mild disc space narrowing. Mild to moderate diffuse disc bulging, 
lateralizing somewhat toward the left. Mild bilateral facet osteoarthrosis. No 
substantial canal stenosis. Mild left foraminal narrowing. L3-L4: Moderate disc space narrowing and diffuse disc bulging. Mild-moderate 
right and mild left facet osteoarthrosis. Mild canal stenosis. Moderate right 
and mild left foraminal stenosis. L4-L5: Moderate to severe disc space narrowing. Moderate rightward lateralizing 
diffuse disc bulging. Mild-moderate bilateral facet osteoarthrosis. Mild-moderate canal stenosis. Severe right and mild left foraminal stenosis. L5-S1: Moderate disc space narrowing and rightward lateralizing diffuse disc 
bulging. Mild-moderate left and mild right facet osteoarthrosis. No substantial 
canal stenosis. Moderate-severe left and mild-moderate right foraminal stenosis. Impression IMPRESSION: 
 
1. Heterogeneous marrow pattern, for which clinical correlation is recommended. 2. Multilevel degenerative changes detailed by level above. Note mild L3-4 and 
mild-moderate L4-5 canal stenosis. Note foraminal stenosis which is mild on the 
left at L2-3, moderate on the right and mild on the left at L3-4, severe on the 
right and mild on the left at L4-5, and moderate-severe on the left and 
mild-moderate on the right at L5-S1. Assessment:  
 
Encounter Diagnoses ICD-10-CM ICD-9-CM 1. Neuroforaminal stenosis of lumbar spine M99.83 724.02  
2. Polyneuropathy G62.9 356.9 3. Gait instability R26.81 66.2  
 
68year old AAM here for f/u of gradual onset slowly progressive lower extremity weakness. He has required a walker for over a year to assist with ambulation and wheelchair more recently over the past 12 months due to gait instability. He also endorses rather chronic LBP with occasional radicular symptoms. It appears he is having more gait instability than lacy weakness of the lower limbs on examination. This is likely due to a combination of severe peripheral sensorimotor neuropathy confirmed on recent EMG as well as lumbar neuroforaminal stenosis resulting in lower back pain/radicular symptoms. Risk factors for neuropathy include h/o diabetes as well as thyroid/parathyroid disorder. Plan:  
Referral to pain management for lumbar neuroforaminal stenosis Strict glucose control for probable DM-related severe PN 
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  
  
 
 
Signed: 
Alex Urbina DO 
3/11/2020 
9:57 AM

## 2020-03-11 NOTE — PATIENT INSTRUCTIONS
PRESCRIPTION REFILL POLICY [facility] Statement to Patients April 1, 2014 In an effort to ensure the large volume of patient prescription refills is processed in the most efficient and expeditious manner, we are asking our patients to assist us by calling your Pharmacy for all prescription refills, this will include also your  Mail Order Pharmacy. The pharmacy will contact our office electronically to continue the refill process. Please do not wait until the last minute to call your pharmacy. We need at least 48 hours (2days) to fill prescriptions. We also encourage you to call your pharmacy before going to  your prescription to make sure it is ready. With regard to controlled substance prescription refill requests (narcotic refills) that need to be picked up at our office, we ask your cooperation by providing us with at least 72 hours (3days) notice that you will need a refill. We will not refill narcotic prescription refill requests after 4:00pm on any weekday, Monday through Thursday, or after 2:00pm on Fridays, or on the weekends. We encourage everyone to explore another way of getting your prescription refill request processed using [employer], our patient web portal through our electronic medical record system. [employer] is an efficient and effective way to communicate your medication request directly to the office and  downloadable as an zachery on your smart phone . [employer] also features a review functionality that allows you to view your medication list as well as leave messages for your physician. Are you ready to get connected? If so please review the attatched instructions or speak to any of our staff to get you set up right away! Thank you so much for your cooperation. Should you have any questions please contact our Practice Administrator. The Physicians and Staff,  [facility]

## 2020-09-15 NOTE — PERIOP NOTES
Spoke with Croinne, patient's nurse at [facility], she is going to fax [facility] and H&P to PAT. Also let her know that we need them to obtain an EKG, have it read and faxed to us sooner than later incase abnormal.  Also set up for COVID testing, she will arrange for facility to transport him for this. Explained once I receive the MAR and H&P I can then provide the preop instructions and meds to them. She verbalized understanding, fax # to PAT provided.

## 2020-09-15 NOTE — PERIOP NOTES
Spoke with Corinne, patient's nurse at [facility], she is going to fax [facility] and H&P to PAT. Also let her know that we need them to obtain an EKG, have it read and faxed to us sooner than later incase abnormal.  Also set up for COVID testing, she will arrange for facility to transport him for this. Explained once I receive the MAR and H&P I can then provide the preop instructions and meds to them. She verbalized understanding, fax # to PAT provided.

## 2020-09-16 RX ORDER — GLIPIZIDE 5 MG/1
5 TABLET ORAL DAILY
COMMUNITY
End: 2020-10-22 | Stop reason: CLARIF

## 2020-09-16 RX ORDER — DOCUSATE SODIUM 100 MG/1
100 CAPSULE, LIQUID FILLED ORAL DAILY
COMMUNITY
End: 2020-10-22 | Stop reason: CLARIF

## 2020-09-16 RX ORDER — ASCORBIC ACID 500 MG
500 TABLET ORAL DAILY
COMMUNITY
End: 2021-01-01

## 2020-09-16 RX ORDER — HYDROCORTISONE 1 %
CREAM (GRAM) TOPICAL DAILY
COMMUNITY
End: 2020-10-22 | Stop reason: CLARIF

## 2020-09-16 RX ORDER — TIZANIDINE 2 MG/1
2 TABLET ORAL 2 TIMES DAILY
COMMUNITY
End: 2021-01-01

## 2020-09-16 RX ORDER — GLIPIZIDE 5 MG/1
5 TABLET, FILM COATED, EXTENDED RELEASE ORAL DAILY
COMMUNITY
End: 2021-01-01

## 2020-09-16 NOTE — PERIOP NOTES
[facility]  Preoperative Instructions        Surgery Date 9/25/20          Time of Arrival 0730                                                  (procedure at 0930)    1. On the day of your surgery, please report to the Surgical Services Registration Desk and sign in at your designated time. The Surgery Center is located to the right of the Emergency Room. 2. You must have someone with you to drive you home. You should not drive a car for 24 hours following surgery. Please make arrangements for a friend or family member to stay with you for the first 24 hours after your surgery. 3. Do not have anything to eat or drink (including water, gum, mints, coffee, juice) after midnight ?9/24/20? Regulo Drones ? This may not apply to medications prescribed by your physician. ?(Please note below the special instructions with medications to take the morning of your procedure.)    4. We recommend you do not drink any alcoholic beverages for 24 hours before and after your surgery. 5. Contact your surgeons office for instructions on the following medications: non-steroidal anti-inflammatory drugs (i.e. Advil, Aleve), vitamins, and supplements. (Some surgeons will want you to stop these medications prior to surgery and others may allow you to take them)  **If you are currently taking Plavix, Coumadin, Aspirin and/or other blood-thinning agents, contact your surgeon for instructions. ** Your surgeon will partner with the physician prescribing these medications to determine if it is safe to stop or if you need to continue taking. Please do not stop taking these medications without instructions from your surgeon    6. Wear comfortable clothes. Wear glasses instead of contacts. Do not bring any money or jewelry. Please bring picture ID, insurance card, and any prearranged co-payment or hospital payment. Do not wear make-up, particularly mascara the morning of your surgery.   Do not wear nail polish, particularly if you are having foot /hand surgery. Wear your hair loose or down, no ponytails, buns, minh pins or clips. All body piercings must be removed. Please shower with antibacterial soap for three consecutive days before and on the morning of surgery, but do not apply any lotions, powders or deodorants after the shower on the day of surgery. Please use a fresh towels after each shower. Please sleep in clean clothes and change bed linens the night before surgery. Please do not shave for 48 hours prior to surgery. Shaving of the face is acceptable. 7. You should understand that if you do not follow these instructions your surgery may be cancelled. If your physical condition changes (I.e. fever, cold or flu) please contact your surgeon as soon as possible. 8. It is important that you be on time. If a situation occurs where you may be late, please call [phone] (OR Holding Area). 9. If you have any questions and or problems, please call [phone] (Pre-admission Testing). 10. Your surgery time may be subject to change. You will receive a phone call the evening prior if your time changes. 11.  If having outpatient surgery, you must have someone to drive you here, stay with you during the duration of your stay, and to drive you home at time of discharge. Special Instructions: please complete an EKG and fax copy and MD interpretation to [phone]  covid testing 9/21 10:00 pull up to back of M[facility] [address] ([address])    TAKE ALL MEDICATIONS DAY OF SURGERY EXCEPT: aspirin, vitamins/supplements, glipizide, glucotrol, lotions/creams, tucks      I understand a pre-operative phone call will be made to verify my surgery time. In the event that I am not available, I give permission for a message to be left on my answering service and/or with another person?   Kerline Valencia [phone]         ___________________      __________   _________    (Signature of Patient)             (Witness) (Date and Time)

## 2020-09-16 NOTE — PERIOP NOTES
[facility]  Preoperative Instructions        Surgery Date 9/25/20          Time of Arrival 0730                                                  (procedure at 0930)    1. On the day of your surgery, please report to the Surgical Services Registration Desk and sign in at your designated time. The Surgery Center is located to the right of the Emergency Room. 2. You must have someone with you to drive you home. You should not drive a car for 24 hours following surgery. Please make arrangements for a friend or family member to stay with you for the first 24 hours after your surgery. 3. Do not have anything to eat or drink (including water, gum, mints, coffee, juice) after midnight ?9/24/20? Callie Vale ? This may not apply to medications prescribed by your physician. ?(Please note below the special instructions with medications to take the morning of your procedure.)    4. We recommend you do not drink any alcoholic beverages for 24 hours before and after your surgery. 5. Contact your surgeons office for instructions on the following medications: non-steroidal anti-inflammatory drugs (i.e. Advil, Aleve), vitamins, and supplements. (Some surgeons will want you to stop these medications prior to surgery and others may allow you to take them)  **If you are currently taking Plavix, Coumadin, Aspirin and/or other blood-thinning agents, contact your surgeon for instructions. ** Your surgeon will partner with the physician prescribing these medications to determine if it is safe to stop or if you need to continue taking. Please do not stop taking these medications without instructions from your surgeon    6. Wear comfortable clothes. Wear glasses instead of contacts. Do not bring any money or jewelry. Please bring picture ID, insurance card, and any prearranged co-payment or hospital payment. Do not wear make-up, particularly mascara the morning of your surgery.   Do not wear nail polish, particularly if you are having foot /hand surgery. Wear your hair loose or down, no ponytails, buns, minh pins or clips. All body piercings must be removed. Please shower with antibacterial soap for three consecutive days before and on the morning of surgery, but do not apply any lotions, powders or deodorants after the shower on the day of surgery. Please use a fresh towels after each shower. Please sleep in clean clothes and change bed linens the night before surgery. Please do not shave for 48 hours prior to surgery. Shaving of the face is acceptable. 7. You should understand that if you do not follow these instructions your surgery may be cancelled. If your physical condition changes (I.e. fever, cold or flu) please contact your surgeon as soon as possible. 8. It is important that you be on time. If a situation occurs where you may be late, please call [phone] (OR Holding Area). 9. If you have any questions and or problems, please call [phone] (Pre-admission Testing). 10. Your surgery time may be subject to change. You will receive a phone call the evening prior if your time changes. 11.  If having outpatient surgery, you must have someone to drive you here, stay with you during the duration of your stay, and to drive you home at time of discharge. Special Instructions: please complete an EKG and fax copy and MD interpretation to [phone]  covid testing 9/21 10:00 pull up to back of M[facility] [address] ([address])    TAKE ALL MEDICATIONS DAY OF SURGERY EXCEPT: aspirin, vitamins/supplements, glipizide, glucotrol, lotions/creams, tucks      I understand a pre-operative phone call will be made to verify my surgery time. In the event that I am not available, I give permission for a message to be left on my answering service and/or with another person?   Donald Hatfield [phone]         ___________________      __________   _________    (Signature of Patient)             (Witness) (Date and Time)

## 2020-09-21 ENCOUNTER — HOSPITAL ENCOUNTER (OUTPATIENT)
Dept: PREADMISSION TESTING | Age: 70
Discharge: HOME | End: 2020-09-21
Payer: MEDICARE

## 2020-09-21 PROCEDURE — 87635 SARS-COV-2 COVID-19 AMP PRB: CPT

## 2020-09-22 LAB
HEALTH STATUS, XMCV2T: NORMAL
SARS-COV-2, COV2NT: NOT DETECTED
SOURCE, COVRS: NORMAL
SPECIMEN SOURCE, FCOV2M: NORMAL
SPECIMEN TYPE, XMCV1T: NORMAL

## 2020-09-22 NOTE — PERIOP NOTES
Have called [facility] x4 requesting copy of EKG tracing be faxed to PAT. Keep receiving only the dictated interpretation. Also faxed request x2    Called and LM with a nursing manager re: several attempts to get the EKG tracing and still need it. Fax and CB numbers provided.

## 2020-09-22 NOTE — PERIOP NOTES
Have called [facility] x4 requesting copy of EKG tracing be faxed to PAT. Keep receiving only the dictated interpretation. Also faxed request x2    Called and LM with a nursing manager re: several attempts to get the EKG tracing and still need it. Fax and CB numbers provided.

## 2020-09-25 ENCOUNTER — APPOINTMENT (OUTPATIENT)
Dept: GENERAL RADIOLOGY | Age: 70
End: 2020-09-25
Attending: SURGERY
Payer: MEDICARE

## 2020-09-25 ENCOUNTER — ANESTHESIA EVENT (OUTPATIENT)
Dept: SURGERY | Age: 70
End: 2020-09-25
Payer: MEDICARE

## 2020-09-25 ENCOUNTER — ANESTHESIA (OUTPATIENT)
Dept: SURGERY | Age: 70
End: 2020-09-25
Payer: MEDICARE

## 2020-09-25 ENCOUNTER — HOSPITAL ENCOUNTER (OUTPATIENT)
Age: 70
Setting detail: OUTPATIENT SURGERY
Discharge: NURSING, REHAB OR LONG-TERM CARE | End: 2020-09-25
Attending: SURGERY | Admitting: SURGERY
Payer: MEDICARE

## 2020-09-25 VITALS
SYSTOLIC BLOOD PRESSURE: 104 MMHG | DIASTOLIC BLOOD PRESSURE: 63 MMHG | TEMPERATURE: 97.6 F | HEART RATE: 80 BPM | WEIGHT: 202.6 LBS | OXYGEN SATURATION: 96 % | BODY MASS INDEX: 30.01 KG/M2 | RESPIRATION RATE: 15 BRPM | HEIGHT: 69 IN

## 2020-09-25 DIAGNOSIS — Z99.2 ESRD (END STAGE RENAL DISEASE) ON DIALYSIS (HCC): Primary | ICD-10-CM

## 2020-09-25 DIAGNOSIS — N18.6 ESRD (END STAGE RENAL DISEASE) ON DIALYSIS (HCC): Primary | ICD-10-CM

## 2020-09-25 LAB
ANION GAP BLD CALC-SCNC: 19 MMOL/L (ref 10–20)
ANION GAP SERPL CALC-SCNC: 9 MMOL/L (ref 5–15)
BUN BLD-MCNC: 36 MG/DL (ref 9–20)
BUN SERPL-MCNC: 41 MG/DL (ref 6–20)
BUN/CREAT SERPL: 7 (ref 12–20)
CA-I BLD-MCNC: 1.11 MMOL/L (ref 1.12–1.32)
CALCIUM SERPL-MCNC: 8.5 MG/DL (ref 8.5–10.1)
CHLORIDE BLD-SCNC: 97 MMOL/L (ref 98–107)
CHLORIDE SERPL-SCNC: 101 MMOL/L (ref 97–108)
CO2 BLD-SCNC: 26 MMOL/L (ref 21–32)
CO2 SERPL-SCNC: 26 MMOL/L (ref 21–32)
CREAT BLD-MCNC: 6.4 MG/DL (ref 0.6–1.3)
CREAT SERPL-MCNC: 6.01 MG/DL (ref 0.7–1.3)
GLUCOSE BLD-MCNC: 106 MG/DL (ref 65–100)
GLUCOSE SERPL-MCNC: 103 MG/DL (ref 65–100)
HCT VFR BLD CALC: 35 % (ref 36.6–50.3)
POTASSIUM BLD-SCNC: 2.8 MMOL/L (ref 3.5–5.1)
POTASSIUM SERPL-SCNC: 2.7 MMOL/L (ref 3.5–5.1)
SERVICE CMNT-IMP: ABNORMAL
SODIUM BLD-SCNC: 138 MMOL/L (ref 136–145)
SODIUM SERPL-SCNC: 136 MMOL/L (ref 136–145)

## 2020-09-25 PROCEDURE — 77030020256 HC SOL INJ NACL 0.9%  500ML: Performed by: SURGERY

## 2020-09-25 PROCEDURE — 77030002987 HC SUT PROL J&J -B: Performed by: SURGERY

## 2020-09-25 PROCEDURE — C1757 CATH, THROMBECTOMY/EMBOLECT: HCPCS | Performed by: SURGERY

## 2020-09-25 PROCEDURE — 73060 X-RAY EXAM OF HUMERUS: CPT

## 2020-09-25 PROCEDURE — 74011000250 HC RX REV CODE- 250: Performed by: NURSE ANESTHETIST, CERTIFIED REGISTERED

## 2020-09-25 PROCEDURE — 77030013515 HC DEV INFL ANGI BSC -B: Performed by: SURGERY

## 2020-09-25 PROCEDURE — 77030002916 HC SUT ETHLN J&J -A: Performed by: SURGERY

## 2020-09-25 PROCEDURE — 74011000250 HC RX REV CODE- 250: Performed by: SURGERY

## 2020-09-25 PROCEDURE — 2709999900 HC NON-CHARGEABLE SUPPLY: Performed by: SURGERY

## 2020-09-25 PROCEDURE — 74011250637 HC RX REV CODE- 250/637: Performed by: SURGERY

## 2020-09-25 PROCEDURE — 74011250636 HC RX REV CODE- 250/636: Performed by: SURGERY

## 2020-09-25 PROCEDURE — 77030008463 HC STPLR SKN PROX J&J -B: Performed by: SURGERY

## 2020-09-25 PROCEDURE — C1894 INTRO/SHEATH, NON-LASER: HCPCS | Performed by: SURGERY

## 2020-09-25 PROCEDURE — 74011000636 HC RX REV CODE- 636: Performed by: SURGERY

## 2020-09-25 PROCEDURE — 77030008684 HC TU ET CUF COVD -B: Performed by: ANESTHESIOLOGY

## 2020-09-25 PROCEDURE — 77030004561 HC CATH ANGI DX COBRA ANGI -B: Performed by: SURGERY

## 2020-09-25 PROCEDURE — 76210000017 HC OR PH I REC 1.5 TO 2 HR: Performed by: SURGERY

## 2020-09-25 PROCEDURE — 74011250636 HC RX REV CODE- 250/636

## 2020-09-25 PROCEDURE — 77030002986 HC SUT PROL J&J -A: Performed by: SURGERY

## 2020-09-25 PROCEDURE — 74011250636 HC RX REV CODE- 250/636: Performed by: NURSE ANESTHETIST, CERTIFIED REGISTERED

## 2020-09-25 PROCEDURE — 74011250636 HC RX REV CODE- 250/636: Performed by: ANESTHESIOLOGY

## 2020-09-25 PROCEDURE — C1769 GUIDE WIRE: HCPCS | Performed by: SURGERY

## 2020-09-25 PROCEDURE — 36415 COLL VENOUS BLD VENIPUNCTURE: CPT

## 2020-09-25 PROCEDURE — 80047 BASIC METABLC PNL IONIZED CA: CPT

## 2020-09-25 PROCEDURE — 77030013058 HC DEV INFL ANGIO BSC -B: Performed by: SURGERY

## 2020-09-25 PROCEDURE — 76000 FLUOROSCOPY <1 HR PHYS/QHP: CPT

## 2020-09-25 PROCEDURE — 80048 BASIC METABOLIC PNL TOTAL CA: CPT

## 2020-09-25 PROCEDURE — C1725 CATH, TRANSLUMIN NON-LASER: HCPCS | Performed by: SURGERY

## 2020-09-25 PROCEDURE — 77030031139 HC SUT VCRL2 J&J -A: Performed by: SURGERY

## 2020-09-25 PROCEDURE — 77030002996 HC SUT SLK J&J -A: Performed by: SURGERY

## 2020-09-25 PROCEDURE — 76060000036 HC ANESTHESIA 2.5 TO 3 HR: Performed by: SURGERY

## 2020-09-25 PROCEDURE — 77030014008 HC SPNG HEMSTAT J&J -C: Performed by: SURGERY

## 2020-09-25 PROCEDURE — 77030026438 HC STYL ET INTUB CARD -A: Performed by: ANESTHESIOLOGY

## 2020-09-25 PROCEDURE — 74011000272 HC RX REV CODE- 272: Performed by: SURGERY

## 2020-09-25 PROCEDURE — 76010000132 HC OR TIME 2.5 TO 3 HR: Performed by: SURGERY

## 2020-09-25 PROCEDURE — 77030019908 HC STETH ESOPH SIMS -A: Performed by: ANESTHESIOLOGY

## 2020-09-25 RX ORDER — SODIUM CHLORIDE 9 MG/ML
25 INJECTION, SOLUTION INTRAVENOUS CONTINUOUS
Status: DISCONTINUED | OUTPATIENT
Start: 2020-09-25 | End: 2020-09-25 | Stop reason: HOSPADM

## 2020-09-25 RX ORDER — FENTANYL CITRATE 50 UG/ML
25 INJECTION, SOLUTION INTRAMUSCULAR; INTRAVENOUS
Status: CANCELLED | OUTPATIENT
Start: 2020-09-25

## 2020-09-25 RX ORDER — ACETAMINOPHEN 325 MG/1
650 TABLET ORAL ONCE
Status: DISCONTINUED | OUTPATIENT
Start: 2020-09-25 | End: 2020-09-25 | Stop reason: HOSPADM

## 2020-09-25 RX ORDER — NEOSTIGMINE METHYLSULFATE 1 MG/ML
INJECTION, SOLUTION INTRAVENOUS AS NEEDED
Status: DISCONTINUED | OUTPATIENT
Start: 2020-09-25 | End: 2020-09-25 | Stop reason: HOSPADM

## 2020-09-25 RX ORDER — ONDANSETRON 2 MG/ML
INJECTION INTRAMUSCULAR; INTRAVENOUS AS NEEDED
Status: DISCONTINUED | OUTPATIENT
Start: 2020-09-25 | End: 2020-09-25 | Stop reason: HOSPADM

## 2020-09-25 RX ORDER — HYDROMORPHONE HYDROCHLORIDE 1 MG/ML
0.5 INJECTION, SOLUTION INTRAMUSCULAR; INTRAVENOUS; SUBCUTANEOUS
Status: CANCELLED | OUTPATIENT
Start: 2020-09-25

## 2020-09-25 RX ORDER — PROPOFOL 10 MG/ML
INJECTION, EMULSION INTRAVENOUS AS NEEDED
Status: DISCONTINUED | OUTPATIENT
Start: 2020-09-25 | End: 2020-09-25 | Stop reason: HOSPADM

## 2020-09-25 RX ORDER — MIDAZOLAM HYDROCHLORIDE 1 MG/ML
1 INJECTION, SOLUTION INTRAMUSCULAR; INTRAVENOUS AS NEEDED
Status: DISCONTINUED | OUTPATIENT
Start: 2020-09-25 | End: 2020-09-25 | Stop reason: HOSPADM

## 2020-09-25 RX ORDER — PHENYLEPHRINE HCL IN 0.9% NACL 0.4MG/10ML
SYRINGE (ML) INTRAVENOUS AS NEEDED
Status: DISCONTINUED | OUTPATIENT
Start: 2020-09-25 | End: 2020-09-25 | Stop reason: HOSPADM

## 2020-09-25 RX ORDER — SODIUM CHLORIDE 0.9 % (FLUSH) 0.9 %
5-40 SYRINGE (ML) INJECTION EVERY 8 HOURS
Status: CANCELLED | OUTPATIENT
Start: 2020-09-25

## 2020-09-25 RX ORDER — MORPHINE SULFATE 10 MG/ML
2 INJECTION, SOLUTION INTRAMUSCULAR; INTRAVENOUS
Status: CANCELLED | OUTPATIENT
Start: 2020-09-25

## 2020-09-25 RX ORDER — HEPARIN SODIUM 1000 [USP'U]/ML
INJECTION, SOLUTION INTRAVENOUS; SUBCUTANEOUS AS NEEDED
Status: DISCONTINUED | OUTPATIENT
Start: 2020-09-25 | End: 2020-09-25 | Stop reason: HOSPADM

## 2020-09-25 RX ORDER — DEXMEDETOMIDINE HYDROCHLORIDE 100 UG/ML
INJECTION, SOLUTION INTRAVENOUS AS NEEDED
Status: DISCONTINUED | OUTPATIENT
Start: 2020-09-25 | End: 2020-09-25 | Stop reason: HOSPADM

## 2020-09-25 RX ORDER — FENTANYL CITRATE 50 UG/ML
INJECTION, SOLUTION INTRAMUSCULAR; INTRAVENOUS AS NEEDED
Status: DISCONTINUED | OUTPATIENT
Start: 2020-09-25 | End: 2020-09-25 | Stop reason: HOSPADM

## 2020-09-25 RX ORDER — ROCURONIUM BROMIDE 10 MG/ML
INJECTION, SOLUTION INTRAVENOUS AS NEEDED
Status: DISCONTINUED | OUTPATIENT
Start: 2020-09-25 | End: 2020-09-25 | Stop reason: HOSPADM

## 2020-09-25 RX ORDER — SODIUM CHLORIDE, SODIUM LACTATE, POTASSIUM CHLORIDE, CALCIUM CHLORIDE 600; 310; 30; 20 MG/100ML; MG/100ML; MG/100ML; MG/100ML
100 INJECTION, SOLUTION INTRAVENOUS CONTINUOUS
Status: DISCONTINUED | OUTPATIENT
Start: 2020-09-25 | End: 2020-09-25 | Stop reason: HOSPADM

## 2020-09-25 RX ORDER — SODIUM CHLORIDE, SODIUM LACTATE, POTASSIUM CHLORIDE, CALCIUM CHLORIDE 600; 310; 30; 20 MG/100ML; MG/100ML; MG/100ML; MG/100ML
100 INJECTION, SOLUTION INTRAVENOUS CONTINUOUS
Status: CANCELLED | OUTPATIENT
Start: 2020-09-25

## 2020-09-25 RX ORDER — SODIUM CHLORIDE 0.9 % (FLUSH) 0.9 %
5-40 SYRINGE (ML) INJECTION AS NEEDED
Status: DISCONTINUED | OUTPATIENT
Start: 2020-09-25 | End: 2020-09-25 | Stop reason: HOSPADM

## 2020-09-25 RX ORDER — HEPARIN SODIUM 1000 [USP'U]/ML
3000 INJECTION, SOLUTION INTRAVENOUS; SUBCUTANEOUS ONCE
Status: COMPLETED | OUTPATIENT
Start: 2020-09-25 | End: 2020-09-25

## 2020-09-25 RX ORDER — HYDROCODONE BITARTRATE AND ACETAMINOPHEN 5; 325 MG/1; MG/1
1 TABLET ORAL
Qty: 18 TAB | Refills: 0 | Status: SHIPPED | OUTPATIENT
Start: 2020-09-25 | End: 2020-09-28

## 2020-09-25 RX ORDER — GLYCOPYRROLATE 0.2 MG/ML
INJECTION INTRAMUSCULAR; INTRAVENOUS AS NEEDED
Status: DISCONTINUED | OUTPATIENT
Start: 2020-09-25 | End: 2020-09-25 | Stop reason: HOSPADM

## 2020-09-25 RX ORDER — WATER FOR INJECTION,STERILE
VIAL (ML) INJECTION
Status: DISCONTINUED
Start: 2020-09-25 | End: 2020-09-25 | Stop reason: HOSPADM

## 2020-09-25 RX ORDER — SUCCINYLCHOLINE CHLORIDE 20 MG/ML
INJECTION INTRAMUSCULAR; INTRAVENOUS AS NEEDED
Status: DISCONTINUED | OUTPATIENT
Start: 2020-09-25 | End: 2020-09-25 | Stop reason: HOSPADM

## 2020-09-25 RX ORDER — LIDOCAINE HYDROCHLORIDE 20 MG/ML
INJECTION, SOLUTION EPIDURAL; INFILTRATION; INTRACAUDAL; PERINEURAL AS NEEDED
Status: DISCONTINUED | OUTPATIENT
Start: 2020-09-25 | End: 2020-09-25 | Stop reason: HOSPADM

## 2020-09-25 RX ORDER — FENTANYL CITRATE 50 UG/ML
50 INJECTION, SOLUTION INTRAMUSCULAR; INTRAVENOUS AS NEEDED
Status: DISCONTINUED | OUTPATIENT
Start: 2020-09-25 | End: 2020-09-25 | Stop reason: HOSPADM

## 2020-09-25 RX ORDER — SODIUM CHLORIDE 0.9 % (FLUSH) 0.9 %
5-40 SYRINGE (ML) INJECTION AS NEEDED
Status: CANCELLED | OUTPATIENT
Start: 2020-09-25

## 2020-09-25 RX ORDER — LIDOCAINE HYDROCHLORIDE 10 MG/ML
0.1 INJECTION, SOLUTION EPIDURAL; INFILTRATION; INTRACAUDAL; PERINEURAL AS NEEDED
Status: DISCONTINUED | OUTPATIENT
Start: 2020-09-25 | End: 2020-09-25 | Stop reason: HOSPADM

## 2020-09-25 RX ORDER — MIDAZOLAM HYDROCHLORIDE 1 MG/ML
0.5 INJECTION, SOLUTION INTRAMUSCULAR; INTRAVENOUS
Status: CANCELLED | OUTPATIENT
Start: 2020-09-25

## 2020-09-25 RX ORDER — ONDANSETRON 2 MG/ML
4 INJECTION INTRAMUSCULAR; INTRAVENOUS AS NEEDED
Status: CANCELLED | OUTPATIENT
Start: 2020-09-25

## 2020-09-25 RX ORDER — ROPIVACAINE HYDROCHLORIDE 5 MG/ML
30 INJECTION, SOLUTION EPIDURAL; INFILTRATION; PERINEURAL AS NEEDED
Status: DISCONTINUED | OUTPATIENT
Start: 2020-09-25 | End: 2020-09-25 | Stop reason: HOSPADM

## 2020-09-25 RX ORDER — HEPARIN SODIUM 1000 [USP'U]/ML
INJECTION, SOLUTION INTRAVENOUS; SUBCUTANEOUS
Status: COMPLETED
Start: 2020-09-25 | End: 2020-09-25

## 2020-09-25 RX ORDER — PROTAMINE SULFATE 10 MG/ML
INJECTION, SOLUTION INTRAVENOUS AS NEEDED
Status: DISCONTINUED | OUTPATIENT
Start: 2020-09-25 | End: 2020-09-25 | Stop reason: HOSPADM

## 2020-09-25 RX ORDER — SODIUM CHLORIDE 0.9 % (FLUSH) 0.9 %
5-40 SYRINGE (ML) INJECTION EVERY 8 HOURS
Status: DISCONTINUED | OUTPATIENT
Start: 2020-09-25 | End: 2020-09-25 | Stop reason: HOSPADM

## 2020-09-25 RX ORDER — DIPHENHYDRAMINE HYDROCHLORIDE 50 MG/ML
12.5 INJECTION, SOLUTION INTRAMUSCULAR; INTRAVENOUS AS NEEDED
Status: CANCELLED | OUTPATIENT
Start: 2020-09-25 | End: 2020-09-25

## 2020-09-25 RX ORDER — DEXAMETHASONE SODIUM PHOSPHATE 4 MG/ML
INJECTION, SOLUTION INTRA-ARTICULAR; INTRALESIONAL; INTRAMUSCULAR; INTRAVENOUS; SOFT TISSUE AS NEEDED
Status: DISCONTINUED | OUTPATIENT
Start: 2020-09-25 | End: 2020-09-25 | Stop reason: HOSPADM

## 2020-09-25 RX ADMIN — FENTANYL CITRATE 50 MCG: 50 INJECTION, SOLUTION INTRAMUSCULAR; INTRAVENOUS at 10:22

## 2020-09-25 RX ADMIN — Medication 3 MG: at 12:41

## 2020-09-25 RX ADMIN — HEPARIN SODIUM 6000 UNITS: 1000 INJECTION, SOLUTION INTRAVENOUS; SUBCUTANEOUS at 11:34

## 2020-09-25 RX ADMIN — SUCCINYLCHOLINE CHLORIDE 200 MG: 20 INJECTION, SOLUTION INTRAMUSCULAR; INTRAVENOUS at 10:22

## 2020-09-25 RX ADMIN — Medication 160 MCG: at 10:42

## 2020-09-25 RX ADMIN — ROCURONIUM BROMIDE 20 MG: 10 INJECTION INTRAVENOUS at 10:38

## 2020-09-25 RX ADMIN — HEPARIN SODIUM 3000 UNITS: 1000 INJECTION, SOLUTION INTRAVENOUS; SUBCUTANEOUS at 14:45

## 2020-09-25 RX ADMIN — ONDANSETRON HYDROCHLORIDE 4 MG: 2 INJECTION, SOLUTION INTRAMUSCULAR; INTRAVENOUS at 10:43

## 2020-09-25 RX ADMIN — SODIUM CHLORIDE: 900 INJECTION, SOLUTION INTRAVENOUS at 12:42

## 2020-09-25 RX ADMIN — Medication 160 MCG: at 10:22

## 2020-09-25 RX ADMIN — SODIUM CHLORIDE 25 ML/HR: 900 INJECTION, SOLUTION INTRAVENOUS at 09:03

## 2020-09-25 RX ADMIN — DEXMEDETOMIDINE HYDROCHLORIDE 6 MCG: 100 INJECTION, SOLUTION, CONCENTRATE INTRAVENOUS at 10:53

## 2020-09-25 RX ADMIN — Medication 160 MCG: at 10:48

## 2020-09-25 RX ADMIN — LIDOCAINE HYDROCHLORIDE 60 MG: 20 INJECTION, SOLUTION EPIDURAL; INFILTRATION; INTRACAUDAL; PERINEURAL at 10:22

## 2020-09-25 RX ADMIN — GLYCOPYRROLATE 0.4 MG: 0.2 INJECTION, SOLUTION INTRAMUSCULAR; INTRAVENOUS at 12:41

## 2020-09-25 RX ADMIN — FENTANYL CITRATE 50 MCG: 50 INJECTION, SOLUTION INTRAMUSCULAR; INTRAVENOUS at 10:34

## 2020-09-25 RX ADMIN — PROPOFOL 100 MG: 10 INJECTION, EMULSION INTRAVENOUS at 10:22

## 2020-09-25 RX ADMIN — DEXAMETHASONE SODIUM PHOSPHATE 4 MG: 4 INJECTION, SOLUTION INTRAMUSCULAR; INTRAVENOUS at 10:43

## 2020-09-25 RX ADMIN — PROPOFOL 20 MG: 10 INJECTION, EMULSION INTRAVENOUS at 12:41

## 2020-09-25 RX ADMIN — WATER 2 G: 1 INJECTION INTRAMUSCULAR; INTRAVENOUS; SUBCUTANEOUS at 10:39

## 2020-09-25 RX ADMIN — PROTAMINE SULFATE 25 MG: 10 INJECTION, SOLUTION INTRAVENOUS at 12:40

## 2020-09-25 RX ADMIN — SODIUM CHLORIDE 40 MCG/MIN: 900 INJECTION, SOLUTION INTRAVENOUS at 10:22

## 2020-09-25 RX ADMIN — Medication 3 AMPULE: at 09:03

## 2020-09-25 NOTE — H&P
History and Physical    Subjective:     Yesy Alonso is a 65 y.o. 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 Past Medical History:   Diagnosis Date    Adverse effect of anesthesia     [address] [place] I had a little problem with my lungs, like they tried to colapse, but they got me squared away\"    Arthritis     CAD (coronary artery disease)     Chronic kidney disease     ESRD, [facility] [place]/T,Thurs,Sat    Diabetes ([facility] [address]     type 2    Heart failure ([facility] [address] 2009    Hyperlipidemia     Hypertension     Legal blindness     Orthostatic hypotension     SOB (shortness of breath)     O2 AT 2LPM PRN    Thyroid disease     hyperparathyroidism    Vitamin D deficiency       Past Surgical History:   Procedure Laterality Date    ABDOMEN SURGERY PROC UNLISTED  11/01/2018    lower abdominal bowels removed    HX APPENDECTOMY      HX HEENT      lasik,  left optic nerve surgery    HX VASCULAR ACCESS      right dialysis access femoral    VASCULAR SURGERY PROCEDURE UNLIST      fistula r arm,      Family History   Problem Relation Age of Onset    Hypertension Mother     Diabetes Mother     Hypertension Father     Diabetes Father     Hypertension Sister       Social History     Tobacco Use    Smoking status: Former Smoker     Years: 2.00    Smokeless tobacco: Never Used   Substance Use Topics    Alcohol use: No       Prior to Admission medications    Medication Sig Start Date End Date Taking? Authorizing Provider   glipiZIDE (GLUCOTROL) 5 mg tablet Take 5 mg by mouth daily. Yes Provider, Historical   glipiZIDE SR (Glucotrol XL) 5 mg CR tablet Take 5 mg by mouth daily. Yes Provider, Historical   pollen extracts (PROSTAT PO) Take 30 mL by mouth daily. Yes Provider, Historical   ascorbic acid, vitamin C, (Vitamin C) 500 mg tablet Take 500 mg by mouth daily. Yes Provider, Historical   ergocalciferol, vitamin D2, (VITAMIN D2 PO) Take 50 mcg by mouth daily.    Yes Provider, Historical   midodrine (PROAMITINE) 10 mg tablet Take 10 mg by mouth three (3) times daily. Indications: a feeling of dizziness upon standing due to a drop in blood pressure   Yes Provider, Historical   atorvastatin (LIPITOR) 10 mg tablet Take 20 mg by mouth nightly. Yes Provider, Historical   loperamide (IMODIUM) 2 mg capsule Take 2 mg by mouth every four (4) hours as needed for Diarrhea. Yes Provider, Historical   b complex-vitamin c-folic acid (NEPHROCAPS) 1 mg capsule Take 1 Cap by mouth daily. Yes Provider, Historical   phenyleph/pramoxin/glycr/w.pet (PREPARATION H MAXIMUM STRENGTH RE) Insert  into rectum. PREPARATION H CREAM 5-14/4%. INSERT 1 APPLICATION RECTALLY EVERY 4 HRS AS NEEDED FOR HEMORRHOIDS. Yes Provider, Historical   TUCKS, WITCH HAZEL, EX by Apply Externally route as needed. APPLY  TO RECTAL AREA TOPICALLY AY EVERY 8 HRS AS NEEDED FOR HEMORROIDS   Yes Provider, Historical   acetaminophen (TYLENOL EXTRA STRENGTH) 500 mg tablet Take 1,000 mg by mouth every six (6) hours as needed for Pain. Yes Provider, Historical   aspirin 81 mg tablet Take 81 mg by mouth daily. Yes Provider, Historical   docusate sodium (COLACE) 100 mg capsule Take 100 mg by mouth daily. Provider, Historical   hydrocortisone (CORTAID) 1 % topical cream Apply  to affected area daily. Right side of neck daily for pain    Provider, Historical   tiZANidine (ZANAFLEX) 2 mg tablet Take 2 mg by mouth two (2) times a day. Provider, Historical   ondansetron hcl (ZOFRAN) 4 mg tablet Take 4 mg by mouth every six (6) hours as needed for Nausea.     Provider, Historical     Allergies   Allergen Reactions    Adhesive Tape-Silicones Itching     Itchiness         Review of Systems:  Denies CP/SOB    Objective:     Physical Exam:   Visit Vitals  BP (!) 144/82 (BP 1 Location: Left arm, BP Patient Position: At rest)   Pulse 91   Temp 98.5 °F (36.9 °C)   Resp 18   Ht 5' 9\" (1.753 m)   Wt 91.9 kg (202 lb 9.6 oz)   SpO2 98% BMI 29.92 kg/m²     General:  Alert, cooperative, no distress, appears stated age. Head:  Normocephalic, without obvious abnormality, atraumatic. Neck: Supple, symmetrical, trachea midline, no adenopathy, thyroid: no enlargement/tenderness/nodules, no carotid bruit and no JVD. Lungs:   Clear to auscultation bilaterally. Heart:  Regular rate and rhythm, S1, S2 normal, no murmur, click, rub or gallop. Abdomen:   Soft, non-tender. Bowel sounds normal. No masses,  No organomegaly. Extremities: Extremities normal, atraumatic, no cyanosis or edema. Pulses: No Rt radial pulse           Neurologic: Normal strength, sensation throughout. 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

## 2020-09-25 NOTE — ANESTHESIA POSTPROCEDURE EVALUATION
Procedure(s): 
THROMBECTOMY RIGHT BRACHIAL ARTERY AND angiogram and anigioplasty of ARTERIO VENOUS GRAFT. general 
 
Anesthesia Post Evaluation Patient location during evaluation: PACU Note status: Adequate. Level of consciousness: responsive to verbal stimuli and sleepy but conscious Pain management: satisfactory to patient Airway patency: patent Anesthetic complications: no 
Cardiovascular status: acceptable Respiratory status: acceptable Hydration status: acceptable Comments: +Post-Anesthesia Evaluation and Assessment Patient: Lawyer Nunes MRN: [id number]  SSN: [id number] YOB: 1950  Age: 65 y.o. Sex: male Cardiovascular Function/Vital Signs BP (!) 95/51   Pulse 81   Temp 36.6 °C (97.8 °F)   Resp 14   Ht 5' 9\" (1.753 m)   Wt 91.9 kg (202 lb 9.6 oz)   SpO2 100%   BMI 29.92 kg/m² Patient is status post Procedure(s): 
THROMBECTOMY RIGHT BRACHIAL ARTERY AND angiogram and anigioplasty of ARTERIO VENOUS GRAFT. Nausea/Vomiting: Controlled. Postoperative hydration reviewed and adequate. Pain: 
Pain Scale 1: Numeric (0 - 10) (09/25/20 1258) Pain Intensity 1: 0 (09/25/20 1258) Managed. Neurological Status:  
Neuro (WDL): Exceptions to WDL (09/25/20 1258) At baseline. Mental Status and Level of Consciousness: Arousable. Pulmonary Status:  
O2 Device: Nasal cannula (09/25/20 1330) Adequate oxygenation and airway patent. Complications related to anesthesia: None Post-anesthesia assessment completed. No concerns. I have evaluated the patient and the patient is stable and ready to be discharged from PACU . Signed By: Billy Hansen MD  
 9/25/2020 INITIAL Post-op Vital signs:  
Vitals Value Taken Time /52 9/25/2020  1:35 PM  
Temp 36.6 °C (97.8 °F) 9/25/2020 12:59 PM  
Pulse 78 9/25/2020  1:36 PM  
Resp 17 9/25/2020  1:36 PM  
SpO2 100 % 9/25/2020  1:36 PM  
Vitals shown include unvalidated device data.

## 2020-09-25 NOTE — DISCHARGE INSTRUCTIONS
Patient Discharge Instructions    Neil Rose / [id number] : 1950    Admitted 2020 Discharged: 2020     Take Home Medications       · It is important that you take the medication exactly as they are prescribed. · Keep your medication in the bottles provided by the pharmacist and keep a list of the medication names, dosages, and times to be taken in your wallet. · Do not take other medications without consulting your doctor. What to do at [address] [place] [address] [place]: Remove bandage in two days then OK to leave open to air unless there is drainage. If there is drainage, apply dry dressing to incision daily until drainage stops. Recommended diet: Renal    Recommended activity: As Tolerated. No Strenuous activity or heavy lifting with right arm    If you experience any of the following symptoms severe right hand pain, weakness, numbness, or discoloration, please follow up with Dr Eze Tomlinson ASA. Follow-up with Dr Eze Tomlinson in 2-3 weeks Kee Mckay from Nurse    PATIENT INSTRUCTIONS:    After general anesthesia or intravenous sedation, for 24 hours or while taking prescription Narcotics:  · Limit your activities  · Do not drive and operate hazardous machinery  · Do not make important personal or business decisions  · Do  not drink alcoholic beverages  · If you have not urinated within 8 hours after discharge, please contact your surgeon on call. Report the following to your surgeon:  · Excessive pain, swelling, redness or odor of or around the surgical area  · Temperature over 100.5  · Nausea and vomiting lasting longer than 4 hours or if unable to take medications  · Any signs of decreased circulation or nerve impairment to extremity: change in color, persistent  numbness, tingling, coldness or increase pain  · Any questions      *  Please give a list of your current medications to your Primary Care Provider.     *  Please update this list whenever your medications are discontinued, doses are      changed, or new medications (including over-the-counter products) are added. *  Please carry medication information at all times in case of emergency situations. These are general instructions for a healthy lifestyle:    No smoking/ No tobacco products/ Avoid exposure to second hand smoke  Surgeon General's Warning:  Quitting smoking now greatly reduces serious risk to your health. Obesity, smoking, and sedentary lifestyle greatly increases your risk for illness    A healthy diet, regular physical exercise & weight monitoring are important for maintaining a healthy lifestyle    You may be retaining fluid if you have a history of heart failure or if you experience any of the following symptoms:  Weight gain of 3 pounds or more overnight or 5 pounds in a week, increased swelling in our hands or feet or shortness of breath while lying flat in bed. Please call your doctor as soon as you notice any of these symptoms; do not wait until your next office visit. A common side effect of anesthesia following surgery is nausea and/or vomiting. In order to decrease symptoms, it is wise to avoid foods that are high in fat, greasy foods, milk products, and spicy foods for the first 24 hours. Acceptable foods for the first 24 hours following surgery include but are not limited to:     soup   broth    toast    crackers    applesauce    bananas    mashed potatoes,   soft or scrambled eggs   oatmeal    jello    It is important to eat when taking your pain medication. This will help to prevent nausea. If possible, please try to time your meals with your medications. It is very important to stay hydrated following surgery. Sip fluids frequently while awake. Avoid acidic drinks such as citrus juices and soda for 24 hours. Carbonated beverages may cause bloating and gas.  Acceptable fluids include:    - water (flavor packets may add variety)  - coffee or tea (in moderation)  - Gatorade  - Enrique-aid  - apple juice  - cranberry juice    You are encouraged to cough and deep breathe every hour when awake. This will help to prevent respiratory complications following anesthesia. You may want to hug a pillow when coughing and sneezing to add additional support to the surgical area and to decrease discomfort if you had abdominal or chest surgery. If you are discharged home with support stockings, you may remove them after 24 hours. Support stockings are used to help prevent blood clots in the legs following surgery. Please take time to review all of your Home Care Instructions and Medication Information sheets provided in your discharge packet. If you have any questions, please contact your surgeons office. Thank you. TO PREVENT AN INFECTION      1. [address] YOUR HANDS     To prevent infection, good handwashing is the most important thing you or your caregiver can do.  Wash your hands with soap and water or use the hand  we gave you before you touch any wounds. 2. SHOWER     Use the antibacterial soap we gave you when you take a shower.  Shower with this soap until your wounds are healed.  To reach all areas of your body, you may need someone to help you.  Dont forget to clean your belly button with every shower. 3.  USE CLEAN SHEETS     Use freshly cleaned sheets on your bed after surgery.  To keep the surgery site clean, do not allow pets to sleep with you while your wound is still healing. 4. STOP SMOKING     Stop smoking, or at least cut back on smoking     Smoking slows your healing. 5.  CONTROL YOUR BLOOD SUGAR     High blood sugars slow wound healing.  If you are diabetic, control your blood sugar levels before and after your surgery.          ___________________________________________________________________________________________________________________________________

## 2020-09-25 NOTE — PERIOP NOTES
[address] from Operating Room to PACU    Report received from RENE Dickey RN and Yumiko Waters CRNA regarding Lg Sultana. .      Surgeon(s):  Rony Petty MD  And Procedure(s) (LRB):  THROMBECTOMY RIGHT BRACHIAL ARTERY AND angiogram and anigioplasty of ARTERIO VENOUS GRAFT (Right)  confirmed   with allergies and dressings discussed. Anesthesia type, drugs, patient history, complications, estimated blood loss, vital signs, intake and output, and last pain medication, lines, reversal medications and temperature were reviewed. 1440  Discharge brochure provided; Reviewed DC instructions with patient's power of , Wilhemenia Epley . Opportunity for questions and clarifications was provided; verbalized understanding. Follow-up appointments reviewed/written for patient. 1445 HD catheter blue port flushed with saline and 3000 units heparin and capped. 1454 Pt stable for discharge; Wheel chair Shayna Staton to provide transportation to [facility]. Clarified all personal belongings sent with patient. Patient discharged in his wheel chair with his clothes, hat and shoes on.

## 2020-09-25 NOTE — DISCHARGE INSTRUCTIONS
Patient Discharge Instructions    Akira Holguin / [id number] : 1950    Admitted 2020 Discharged: 2020     Take Home Medications       · It is important that you take the medication exactly as they are prescribed. · Keep your medication in the bottles provided by the pharmacist and keep a list of the medication names, dosages, and times to be taken in your wallet. · Do not take other medications without consulting your doctor. What to do at [address] [place] [address] [place]: Remove bandage in two days then OK to leave open to air unless there is drainage. If there is drainage, apply dry dressing to incision daily until drainage stops. Recommended diet: Renal    Recommended activity: As Tolerated. No Strenuous activity or heavy lifting with right arm    If you experience any of the following symptoms severe right hand pain, weakness, numbness, or discoloration, please follow up with Dr Nancy Dumont ASAP. Follow-up with Dr Nancy Dumont in 2-3 weeks Kee Mckay from Nurse    PATIENT INSTRUCTIONS:    After general anesthesia or intravenous sedation, for 24 hours or while taking prescription Narcotics:  · Limit your activities  · Do not drive and operate hazardous machinery  · Do not make important personal or business decisions  · Do  not drink alcoholic beverages  · If you have not urinated within 8 hours after discharge, please contact your surgeon on call. Report the following to your surgeon:  · Excessive pain, swelling, redness or odor of or around the surgical area  · Temperature over 100.5  · Nausea and vomiting lasting longer than 4 hours or if unable to take medications  · Any signs of decreased circulation or nerve impairment to extremity: change in color, persistent  numbness, tingling, coldness or increase pain  · Any questions      *  Please give a list of your current medications to your Primary Care Provider.     *  Please update this list whenever your medications are discontinued, doses are      changed, or new medications (including over-the-counter products) are added. *  Please carry medication information at all times in case of emergency situations. These are general instructions for a healthy lifestyle:    No smoking/ No tobacco products/ Avoid exposure to second hand smoke  Surgeon General's Warning:  Quitting smoking now greatly reduces serious risk to your health. Obesity, smoking, and sedentary lifestyle greatly increases your risk for illness    A healthy diet, regular physical exercise & weight monitoring are important for maintaining a healthy lifestyle    You may be retaining fluid if you have a history of heart failure or if you experience any of the following symptoms:  Weight gain of 3 pounds or more overnight or 5 pounds in a week, increased swelling in our hands or feet or shortness of breath while lying flat in bed. Please call your doctor as soon as you notice any of these symptoms; do not wait until your next office visit. A common side effect of anesthesia following surgery is nausea and/or vomiting. In order to decrease symptoms, it is wise to avoid foods that are high in fat, greasy foods, milk products, and spicy foods for the first 24 hours. Acceptable foods for the first 24 hours following surgery include but are not limited to:     soup   broth    toast    crackers    applesauce    bananas    mashed potatoes,   soft or scrambled eggs   oatmeal    jello    It is important to eat when taking your pain medication. This will help to prevent nausea. If possible, please try to time your meals with your medications. It is very important to stay hydrated following surgery. Sip fluids frequently while awake. Avoid acidic drinks such as citrus juices and soda for 24 hours. Carbonated beverages may cause bloating and gas.  Acceptable fluids include:    - water (flavor packets may add variety)  - coffee or tea (in moderation)  - Gatorade  - Enrique-aid  - apple juice  - cranberry juice    You are encouraged to cough and deep breathe every hour when awake. This will help to prevent respiratory complications following anesthesia. You may want to hug a pillow when coughing and sneezing to add additional support to the surgical area and to decrease discomfort if you had abdominal or chest surgery. If you are discharged home with support stockings, you may remove them after 24 hours. Support stockings are used to help prevent blood clots in the legs following surgery. Please take time to review all of your Home Care Instructions and Medication Information sheets provided in your discharge packet. If you have any questions, please contact your surgeons office. Thank you. TO PREVENT AN INFECTION      1. [address] YOUR HANDS     To prevent infection, good handwashing is the most important thing you or your caregiver can do.  Wash your hands with soap and water or use the hand  we gave you before you touch any wounds. 2. SHOWER     Use the antibacterial soap we gave you when you take a shower.  Shower with this soap until your wounds are healed.  To reach all areas of your body, you may need someone to help you.  Dont forget to clean your belly button with every shower. 3.  USE CLEAN SHEETS     Use freshly cleaned sheets on your bed after surgery.  To keep the surgery site clean, do not allow pets to sleep with you while your wound is still healing. 4. STOP SMOKING     Stop smoking, or at least cut back on smoking     Smoking slows your healing. 5.  CONTROL YOUR BLOOD SUGAR     High blood sugars slow wound healing.  If you are diabetic, control your blood sugar levels before and after your surgery.          ___________________________________________________________________________________________________________________________________

## 2020-09-25 NOTE — PERIOP NOTES
08:18= COVID19 test results negative; pt states he quarantined, has not been exposed or had any s/s virus. Warming blanket applied. 08:57= order to access right femoral HD catheter for labs; 10 mL drawn and wasted, then retrieved sample for Chem8;  K 2.8 on Chem8; additional 10 mL drawn and wasted, 10 mL drawn for BMP and walked to lab.    09:53= K results 2.7 from lab; informed Dr Alex Nash; stated that was okay because pt runs low; no orders received.

## 2020-09-25 NOTE — BRIEF OP NOTE
Brief Postoperative Note    Patient: Patrick Chamberlain. YOB: 1950  MRN: [id number]    Date of Procedure: 9/25/2020     Pre-Op Diagnosis: ESRD    Post-Op Diagnosis: Same as preoperative diagnosis. Procedure(s):  THROMBECTOMY RIGHT BRACHIAL ARTERY AND angiogram and anigioplasty of ARTERIO VENOUS GRAFT    Surgeon(s):  Lore Suarez MD    Surgical Assistant: None    Anesthesia: General     Estimated Blood Loss (mL): less than 50     Complications: None    Specimens: * No specimens in log *     Implants: * No implants in log *    Drains: * No LDAs found *    Findings: Subacute and chronic thrombus in distal brachial artery. Successful thrombectomy. Angio revealed good flow to the hand via the ulnar artery with chronic occlusion of the distal radial. Successful thrombectomy of AVG w/ PTA w/ 8x8. Good flow in AVG on completion. OK to use AVG.     Electronically Signed by Celeste Parker MD on 9/25/2020 at 1:24 PM

## 2020-09-25 NOTE — BRIEF OP NOTE
Brief Postoperative Note    Patient: Sherita Barrios. YOB: 1950  MRN: [id number]    Date of Procedure: 9/25/2020     Pre-Op Diagnosis: ESRD    Post-Op Diagnosis: Same as preoperative diagnosis. Procedure(s):  THROMBECTOMY RIGHT BRACHIAL ARTERY AND angiogram and anigioplasty of ARTERIO VENOUS GRAFT    Surgeon(s):  Meme Delgado MD    Surgical Assistant: None    Anesthesia: General     Estimated Blood Loss (mL): less than 50     Complications: None    Specimens: * No specimens in log *     Implants: * No implants in log *    Drains: * No LDAs found *    Findings: Subacute and chronic thrombus in distal brachial artery. Successful thrombectomy. Angio revealed good flow to the hand via the ulnar artery with chronic occlusion of the distal radial. Successful thrombectomy of AVG w/ PTA w/ 8x8. Good flow in AVG on completion. OK to use AVG.     Electronically Signed by Price Tapia MD on 9/25/2020 at 1:24 PM

## 2020-09-25 NOTE — H&P
History and Physical    Subjective:     Sherita Ho is a 65 y.o. 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 Past Medical History:   Diagnosis Date    Adverse effect of anesthesia     [address] [place] I had a little problem with my lungs, like they tried to colapse, but they got me squared away\"    Arthritis     CAD (coronary artery disease)     Chronic kidney disease     [facility], [facility] [place]/T,Thurs,Sat    Diabetes ([facility] [address]     type 2    Heart failure ([facility] [address] 2009    Hyperlipidemia     Hypertension     Legal blindness     Orthostatic hypotension     SOB (shortness of breath)     O2 AT 2LPM PRN    Thyroid disease     hyperparathyroidism    Vitamin D deficiency       Past Surgical History:   Procedure Laterality Date    ABDOMEN SURGERY PROC UNLISTED  11/01/2018    lower abdominal bowels removed    HX APPENDECTOMY      HX HEENT      lasik,  left optic nerve surgery    HX VASCULAR ACCESS      right dialysis access femoral    VASCULAR SURGERY PROCEDURE UNLIST      fistula r arm,      Family History   Problem Relation Age of Onset    Hypertension Mother     Diabetes Mother     Hypertension Father     Diabetes Father     Hypertension Sister       Social History     Tobacco Use    Smoking status: Former Smoker     Years: 2.00    Smokeless tobacco: Never Used   Substance Use Topics    Alcohol use: No       Prior to Admission medications    Medication Sig Start Date End Date Taking? Authorizing Provider   glipiZIDE (GLUCOTROL) 5 mg tablet Take 5 mg by mouth daily. Yes Provider, Historical   glipiZIDE SR (Glucotrol XL) 5 mg CR tablet Take 5 mg by mouth daily. Yes Provider, Historical   pollen extracts (PROSTAT PO) Take 30 mL by mouth daily. Yes Provider, Historical   ascorbic acid, vitamin C, (Vitamin C) 500 mg tablet Take 500 mg by mouth daily. Yes Provider, Historical   ergocalciferol, vitamin D2, (VITAMIN D2 PO) Take 50 mcg by mouth daily.    Yes Provider, Historical   midodrine (PROAMITINE) 10 mg tablet Take 10 mg by mouth three (3) times daily. Indications: a feeling of dizziness upon standing due to a drop in blood pressure   Yes Provider, Historical   atorvastatin (LIPITOR) 10 mg tablet Take 20 mg by mouth nightly. Yes Provider, Historical   loperamide (IMODIUM) 2 mg capsule Take 2 mg by mouth every four (4) hours as needed for Diarrhea. Yes Provider, Historical   b complex-vitamin c-folic acid (NEPHROCAPS) 1 mg capsule Take 1 Cap by mouth daily. Yes Provider, Historical   phenyleph/pramoxin/glycr/w.pet (PREPARATION H MAXIMUM STRENGTH RE) Insert  into rectum. PREPARATION H CREAM 5-14/4%. INSERT 1 APPLICATION RECTALLY EVERY 4 HRS AS NEEDED FOR HEMORRHOIDS. Yes Provider, Historical   TUCKS, WITCH HAZEL, EX by Apply Externally route as needed. APPLY  TO RECTAL AREA TOPICALLY AY EVERY 8 HRS AS NEEDED FOR HEMORROIDS   Yes Provider, Historical   acetaminophen (TYLENOL EXTRA STRENGTH) 500 mg tablet Take 1,000 mg by mouth every six (6) hours as needed for Pain. Yes Provider, Historical   aspirin 81 mg tablet Take 81 mg by mouth daily. Yes Provider, Historical   docusate sodium (COLACE) 100 mg capsule Take 100 mg by mouth daily. Provider, Historical   hydrocortisone (CORTAID) 1 % topical cream Apply  to affected area daily. Right side of neck daily for pain    Provider, Historical   tiZANidine (ZANAFLEX) 2 mg tablet Take 2 mg by mouth two (2) times a day. Provider, Historical   ondansetron hcl (ZOFRAN) 4 mg tablet Take 4 mg by mouth every six (6) hours as needed for Nausea.     Provider, Historical     Allergies   Allergen Reactions    Adhesive Tape-Silicones Itching     Itchiness         Review of Systems:  Denies CP/SOB    Objective:     Physical Exam:   Visit Vitals  BP (!) 144/82 (BP 1 Location: Left arm, BP Patient Position: At rest)   Pulse 91   Temp 98.5 °F (36.9 °C)   Resp 18   Ht 5' 9\" (1.753 m)   Wt 91.9 kg (202 lb 9.6 oz)   SpO2 98% BMI 29.92 kg/m²     General:  Alert, cooperative, no distress, appears stated age. Head:  Normocephalic, without obvious abnormality, atraumatic. Neck: Supple, symmetrical, trachea midline, no adenopathy, thyroid: no enlargement/tenderness/nodules, no carotid bruit and no JVD. Lungs:   Clear to auscultation bilaterally. Heart:  Regular rate and rhythm, S1, S2 normal, no murmur, click, rub or gallop. Abdomen:   Soft, non-tender. Bowel sounds normal. No masses,  No organomegaly. Extremities: Extremities normal, atraumatic, no cyanosis or edema. Pulses: No Rt radial pulse           Neurologic: Normal strength, sensation throughout. Assessment:     Occlusion of Rt brachial artery and AVG    Plan:      Thrombectomy    Signed By: Price Tapia MD     September 25, 2020

## 2020-09-25 NOTE — PERIOP NOTES
08:18= COVID19 test results negative; pt states he quarantined, has not been exposed or had any s/s virus. Warming blanket applied. 08:57= order to access right femoral HD catheter for labs; 10 mL drawn and wasted, then retrieved sample for Chem8;  K 2.8 on Chem8; additional 10 mL drawn and wasted, 10 mL drawn for BMP and walked to lab.    09:53= K results 2.7 from lab; informed Dr Patricia Deluna; stated that was okay because pt runs low; no orders received.

## 2020-09-25 NOTE — ANESTHESIA PREPROCEDURE EVALUATION
Relevant Problems No relevant active problems Anesthetic History No history of anesthetic complications Review of Systems / Medical History Patient summary reviewed, nursing notes reviewed and pertinent labs reviewed Pulmonary Within defined limits Shortness of breath Neuro/Psych Within defined limits Cardiovascular Within defined limits Hypertension CHF 
 
CAD and PAD Exercise tolerance: >4 METS 
  
GI/Hepatic/Renal 
Within defined limits Renal disease: ESRD Endo/Other Within defined limits Diabetes Hypothyroidism Arthritis Other Findings Physical Exam 
 
Airway Mallampati: III 
TM Distance: > 6 cm Neck ROM: normal range of motion, short neck Mouth opening: Normal 
 
 Cardiovascular Regular rate and rhythm,  S1 and S2 normal,  no murmur, click, rub, or gallop Dental 
 
Dentition: Edentulous Pulmonary Breath sounds clear to auscultation Abdominal 
GI exam deferred Other Findings Anesthetic Plan ASA: 3 Anesthesia type: general 
 
Monitoring Plan: BIS Induction: Intravenous Anesthetic plan and risks discussed with: Patient

## 2020-09-25 NOTE — PERIOP NOTES
[address] from Operating Room to PACU    Report received from RENE Dickey RN and Heather Lorenz CRNA regarding Mickiel Mood. .      Surgeon(s):  Reynaldo Saab MD  And Procedure(s) (LRB):  THROMBECTOMY RIGHT BRACHIAL ARTERY AND angiogram and anigioplasty of ARTERIO VENOUS GRAFT (Right)  confirmed   with allergies and dressings discussed. Anesthesia type, drugs, patient history, complications, estimated blood loss, vital signs, intake and output, and last pain medication, lines, reversal medications and temperature were reviewed. 1440  Discharge brochure provided; Reviewed DC instructions with patient's power of , Shelia Higgins . Opportunity for questions and clarifications was provided; verbalized understanding. Follow-up appointments reviewed/written for patient. 1445 HD catheter blue port flushed with saline and 3000 units heparin and capped. 1454 Pt stable for discharge; Wheel chair Maciel Pabon to provide transportation to [facility]. Clarified all personal belongings sent with patient. Patient discharged in his wheel chair with his clothes, hat and shoes on.

## 2020-10-09 NOTE — OP NOTES
[address] 
OPERATIVE REPORT Name:  Ze Kennedy 
MR#:  [id number] :  1950 ACCOUNT #:  [de-identified] DATE OF SERVICE:  2020 PREOPERATIVE DIAGNOSIS:  Thrombosed right arm dialysis graft in the right brachial artery. POSTOPERATIVE DIAGNOSIS:  Thrombosed right arm dialysis graft in the right brachial artery. PROCEDURE PERFORMED: 
1. Open thrombectomy of right brachial artery. 2.  Open thrombectomy of right arm dialysis graft. 3.  Angiogram and angioplasty of right arm dialysis graft. SURGEON:  Armando Whittington MD 
 
ASSISTANT:  None. ANESTHESIA:  General. 
 
COMPLICATIONS:  None. SPECIMENS REMOVED:  None. IMPLANTS:  None. DRAINS:  None. ESTIMATED BLOOD LOSS:  Less than 50 mL. INDICATIONS:  The patient is a 68-year-old male with end-stage renal disease who has a thrombosed right arm dialysis graft. Recent angiogram of the graft showed thrombus within the brachial artery, so a thrombectomy cannot be safely performed on the graft without removing the brachial artery thrombus in order to avoid ischemia of the hand, which is currently asymptomatic. PROCEDURE:  After informed consent was obtained, the patient was given preoperative intravenous antibiotics within 1 hour of the incision. He was taken to the operating room and after induction of adequate general anesthesia, the right arm was prepped and draped as a sterile field. A longitudinal incision was made just distal to the antecubital crease overlying the expected course of the brachial artery. Dissection was carried down to the artery and then a little bit more proximally to isolate the anastomosis of the fistula which is a brachiocephalic fistula. The brachial artery was quite large, but it was very difficult to isolate the radial and ulnar arteries separately.   I was able to isolate the radial artery, but the ulnar artery was more difficult and did not appear to be worth the potential bleeding. The patient was systemically heparinized and a transverse arteriotomy was made in the brachial artery. There was thrombus visible within the lumen which appeared to be subacute. Manjit catheter was passed proximally and removed significant thrombus with restoration of good inflow. A #2 and #3 Manjit catheters were passed distally and significant thrombus was removed. The angiogram of the arm was done injecting into the brachial artery and this showed that the radial artery was patent in the proximal forearm, but chronically occluded distally. The ulnar artery was somewhat small, but was patent into the hand with good filling of the hand. There did not appear to be any residual thrombus. Next, through the same incision, a transverse incision was made in the fistula and there was occlusive thrombus within the fistula and a Manjit catheter was used to remove that thrombus, including all the thrombus between the arterial anastomosis and the transverse incision in the fistula. Everything was flushed with heparinized saline and then the arteriotomy in the brachial artery was closed with 5-0 Prolene suture. The arterial side of the fistula was then flushed and flow was released toward the hand. There was a good signal in the brachial artery. An angiogram was done of the fistula placing a sheath in the fistula and injecting contrast and the fistula was diffusely stenotic. It was angioplastied with an 8 x 8 balloon. Repeat angiogram revealed no residual stenosis, but the access is diffusely diseased. There was palpable thrill, so the small transverse incision in the fistula was closed with running 5-0 Prolene suture. The Doppler signal in the brachial artery was unchanged with the fistula patent. The wound was irrigated with antibiotic irrigation and closed with running Vicryl suture and skin staples. Dry dressing was applied.   The patient was transferred to the PACU in stable condition. All counts were correct. Clare Huerta MD 
 
 
[id number] 
D:  10/09/2020 1:30 
T:  10/09/2020 2:50 
JOB #:  [id number]

## 2020-10-09 NOTE — OP NOTES
[address]  OPERATIVE REPORT    Name:  Elie Davis  MR#:  [id number]  :  1950  ACCOUNT #:  [de-identified]  DATE OF SERVICE:  2020      PREOPERATIVE DIAGNOSIS:  Thrombosed right arm dialysis graft in the right brachial artery. POSTOPERATIVE DIAGNOSIS:  Thrombosed right arm dialysis graft in the right brachial artery. PROCEDURE PERFORMED:  1. Open thrombectomy of right brachial artery. 2.  Open thrombectomy of right arm dialysis graft. 3.  Angiogram and angioplasty of right arm dialysis graft. SURGEON:  Bob Pacheco MD    ASSISTANT:  None. ANESTHESIA:  General.    COMPLICATIONS:  None. SPECIMENS REMOVED:  None. IMPLANTS:  None. DRAINS:  None. ESTIMATED BLOOD LOSS:  Less than 50 mL. INDICATIONS:  The patient is a 68-year-old male with end-stage renal disease who has a thrombosed right arm dialysis graft. Recent angiogram of the graft showed thrombus within the brachial artery, so a thrombectomy cannot be safely performed on the graft without removing the brachial artery thrombus in order to avoid ischemia of the hand, which is currently asymptomatic. PROCEDURE:  After informed consent was obtained, the patient was given preoperative intravenous antibiotics within 1 hour of the incision. He was taken to the operating room and after induction of adequate general anesthesia, the right arm was prepped and draped as a sterile field. A longitudinal incision was made just distal to the antecubital crease overlying the expected course of the brachial artery. Dissection was carried down to the artery and then a little bit more proximally to isolate the anastomosis of the fistula which is a brachiocephalic fistula. The brachial artery was quite large, but it was very difficult to isolate the radial and ulnar arteries separately.   I was able to isolate the radial artery, but the ulnar artery was more difficult and did not appear to be worth the potential bleeding. The patient was systemically heparinized and a transverse arteriotomy was made in the brachial artery. There was thrombus visible within the lumen which appeared to be subacute. Manjit catheter was passed proximally and removed significant thrombus with restoration of good inflow. A #2 and #3 Manjit catheters were passed distally and significant thrombus was removed. The angiogram of the arm was done injecting into the brachial artery and this showed that the radial artery was patent in the proximal forearm, but chronically occluded distally. The ulnar artery was somewhat small, but was patent into the hand with good filling of the hand. There did not appear to be any residual thrombus. Next, through the same incision, a transverse incision was made in the fistula and there was occlusive thrombus within the fistula and a Manjit catheter was used to remove that thrombus, including all the thrombus between the arterial anastomosis and the transverse incision in the fistula. Everything was flushed with heparinized saline and then the arteriotomy in the brachial artery was closed with 5-0 Prolene suture. The arterial side of the fistula was then flushed and flow was released toward the hand. There was a good signal in the brachial artery. An angiogram was done of the fistula placing a sheath in the fistula and injecting contrast and the fistula was diffusely stenotic. It was angioplastied with an 8 x 8 balloon. Repeat angiogram revealed no residual stenosis, but the access is diffusely diseased. There was palpable thrill, so the small transverse incision in the fistula was closed with running 5-0 Prolene suture. The Doppler signal in the brachial artery was unchanged with the fistula patent. The wound was irrigated with antibiotic irrigation and closed with running Vicryl suture and skin staples. Dry dressing was applied.   The patient was transferred to the PACU in stable condition. All counts were correct.         Kerline Gomez MD      [id number]  D:  10/09/2020 1:30  T:  10/09/2020 2:50  JOB #:  [id number]

## 2020-10-22 RX ORDER — HYDROCODONE BITARTRATE AND ACETAMINOPHEN 5; 325 MG/1; MG/1
1 TABLET ORAL
COMMUNITY
End: 2021-01-01

## 2020-10-22 RX ORDER — ALCLOMETASONE DIPROPIONATE 0.5 MG/G
OINTMENT TOPICAL
COMMUNITY
End: 2021-01-01

## 2020-10-22 NOTE — PERIOP NOTES
[facility]  Preoperative Instructions        Surgery Date 10/30/20          Time of Arrival 0545    1. On the day of your surgery, please report to the Surgical Services Registration Desk and sign in at your designated time. The Surgery Center is located to the right of the Emergency Room. 2. You must have someone with you to drive you home. You should not drive a car for 24 hours following surgery. Please make arrangements for a friend or family member to stay with you for the first 24 hours after your surgery. 3. Do not have anything to eat or drink (including water, gum, mints, coffee, juice) after midnight ?10/29/20? Sangeeta Simpler ? This may not apply to medications prescribed by your physician. ?(Please note below the special instructions with medications to take the morning of your procedure.)    4. We recommend you do not drink any alcoholic beverages for 24 hours before and after your surgery. 5. Contact your surgeons office for instructions on the following medications: non-steroidal anti-inflammatory drugs (i.e. Advil, Aleve), vitamins, and supplements. (Some surgeons will want you to stop these medications prior to surgery and others may allow you to take them)  **If you are currently taking Plavix, Coumadin, Aspirin and/or other blood-thinning agents, contact your surgeon for instructions. ** Your surgeon will partner with the physician prescribing these medications to determine if it is safe to stop or if you need to continue taking. Please do not stop taking these medications without instructions from your surgeon    6. Wear comfortable clothes. Wear glasses instead of contacts. Do not bring any money or jewelry. Please bring picture ID, insurance card, and any prearranged co-payment or hospital payment. Do not wear make-up, particularly mascara the morning of your surgery. Do not wear nail polish, particularly if you are having foot /hand surgery.   Wear your hair loose or down, no ponytails, buns, minh pins or clips. All body piercings must be removed. Please shower with antibacterial soap for three consecutive days before and on the morning of surgery, but do not apply any lotions, powders or deodorants after the shower on the day of surgery. Please use a fresh towels after each shower. Please sleep in clean clothes and change bed linens the night before surgery. Please do not shave for 48 hours prior to surgery. Shaving of the face is acceptable. 7. You should understand that if you do not follow these instructions your surgery may be cancelled. If your physical condition changes (I.e. fever, cold or flu) please contact your surgeon as soon as possible. 8. It is important that you be on time. If a situation occurs where you may be late, please call [phone] (OR Holding Area). 9. If you have any questions and or problems, please call [phone] (Pre-admission Testing). 10. Your surgery time may be subject to change. You will receive a phone call the evening prior if your time changes. 11.  If having outpatient surgery, you must have someone to drive you here, stay with you during the duration of your stay, and to drive you home at time of discharge. Special Instructions:     TAKE ALL MEDICATIONS DAY OF SURGERY EXCEPT:  Vitamins/supplements, aspirin, glipizide, topical creams/ointments    I understand a pre-operative phone call will be made to verify my surgery time. In the event that I am not available, I give permission for a message to be left on my answering service and/or with another person?   Yes Kohort [phone]         ___________________      __________   _________    (Signature of Patient)             (Witness)                (Date and Time)

## 2020-10-29 ENCOUNTER — ANESTHESIA EVENT (OUTPATIENT)
Dept: SURGERY | Age: 70
End: 2020-10-29
Payer: MEDICARE

## 2020-10-30 ENCOUNTER — HOSPITAL ENCOUNTER (OUTPATIENT)
Age: 70
Setting detail: OUTPATIENT SURGERY
Discharge: NURSING, REHAB OR LONG-TERM CARE | End: 2020-10-30
Attending: SURGERY | Admitting: SURGERY
Payer: MEDICARE

## 2020-10-30 ENCOUNTER — ANESTHESIA (OUTPATIENT)
Dept: SURGERY | Age: 70
End: 2020-10-30
Payer: MEDICARE

## 2020-10-30 VITALS
HEIGHT: 69 IN | RESPIRATION RATE: 25 BRPM | BODY MASS INDEX: 29.68 KG/M2 | TEMPERATURE: 98.2 F | SYSTOLIC BLOOD PRESSURE: 133 MMHG | HEART RATE: 110 BPM | WEIGHT: 200.4 LBS | OXYGEN SATURATION: 95 % | DIASTOLIC BLOOD PRESSURE: 53 MMHG

## 2020-10-30 DIAGNOSIS — N18.6 ESRD (END STAGE RENAL DISEASE) (HCC): Primary | ICD-10-CM

## 2020-10-30 LAB
ANION GAP BLD CALC-SCNC: 17 MMOL/L (ref 10–20)
BUN BLD-MCNC: 29 MG/DL (ref 9–20)
CA-I BLD-MCNC: 1.14 MMOL/L (ref 1.12–1.32)
CHLORIDE BLD-SCNC: 96 MMOL/L (ref 98–107)
CO2 BLD-SCNC: 30 MMOL/L (ref 21–32)
CREAT BLD-MCNC: 5.8 MG/DL (ref 0.6–1.3)
GLUCOSE BLD STRIP.AUTO-MCNC: 92 MG/DL (ref 65–100)
GLUCOSE BLD-MCNC: 82 MG/DL (ref 65–100)
HCT VFR BLD CALC: 39 % (ref 36.6–50.3)
POTASSIUM BLD-SCNC: 3.3 MMOL/L (ref 3.5–5.1)
SERVICE CMNT-IMP: ABNORMAL
SERVICE CMNT-IMP: NORMAL
SODIUM BLD-SCNC: 139 MMOL/L (ref 136–145)

## 2020-10-30 PROCEDURE — 74011250636 HC RX REV CODE- 250/636: Performed by: SURGERY

## 2020-10-30 PROCEDURE — 77030019908 HC STETH ESOPH SIMS -A: Performed by: ANESTHESIOLOGY

## 2020-10-30 PROCEDURE — 77030002996 HC SUT SLK J&J -A: Performed by: SURGERY

## 2020-10-30 PROCEDURE — 76010000131 HC OR TIME 2 TO 2.5 HR: Performed by: SURGERY

## 2020-10-30 PROCEDURE — 82962 GLUCOSE BLOOD TEST: CPT

## 2020-10-30 PROCEDURE — 74011250636 HC RX REV CODE- 250/636: Performed by: ANESTHESIOLOGY

## 2020-10-30 PROCEDURE — 76210000020 HC REC RM PH II FIRST 0.5 HR: Performed by: SURGERY

## 2020-10-30 PROCEDURE — 74011250637 HC RX REV CODE- 250/637: Performed by: SURGERY

## 2020-10-30 PROCEDURE — 77030002987 HC SUT PROL J&J -B: Performed by: SURGERY

## 2020-10-30 PROCEDURE — 77030013079 HC BLNKT BAIR HGGR 3M -A: Performed by: ANESTHESIOLOGY

## 2020-10-30 PROCEDURE — 2709999900 HC NON-CHARGEABLE SUPPLY: Performed by: SURGERY

## 2020-10-30 PROCEDURE — 77030031753 HC SHR ENDO COAG HARM J&J -E: Performed by: SURGERY

## 2020-10-30 PROCEDURE — 76210000016 HC OR PH I REC 1 TO 1.5 HR: Performed by: SURGERY

## 2020-10-30 PROCEDURE — 77030031139 HC SUT VCRL2 J&J -A: Performed by: SURGERY

## 2020-10-30 PROCEDURE — 76060000035 HC ANESTHESIA 2 TO 2.5 HR: Performed by: SURGERY

## 2020-10-30 PROCEDURE — 77030038692 HC WND DEB SYS IRMX -B: Performed by: SURGERY

## 2020-10-30 PROCEDURE — 77030008684 HC TU ET CUF COVD -B: Performed by: ANESTHESIOLOGY

## 2020-10-30 PROCEDURE — 77030026438 HC STYL ET INTUB CARD -A: Performed by: ANESTHESIOLOGY

## 2020-10-30 PROCEDURE — 77030018836 HC SOL IRR NACL ICUM -A: Performed by: SURGERY

## 2020-10-30 PROCEDURE — 80047 BASIC METABLC PNL IONIZED CA: CPT

## 2020-10-30 PROCEDURE — 77030020256 HC SOL INJ NACL 0.9%  500ML: Performed by: SURGERY

## 2020-10-30 PROCEDURE — 77030008463 HC STPLR SKN PROX J&J -B: Performed by: SURGERY

## 2020-10-30 PROCEDURE — 74011250636 HC RX REV CODE- 250/636: Performed by: NURSE ANESTHETIST, CERTIFIED REGISTERED

## 2020-10-30 PROCEDURE — 74011000250 HC RX REV CODE- 250: Performed by: SURGERY

## 2020-10-30 PROCEDURE — 74011000250 HC RX REV CODE- 250: Performed by: NURSE ANESTHETIST, CERTIFIED REGISTERED

## 2020-10-30 PROCEDURE — C1768 GRAFT, VASCULAR: HCPCS | Performed by: SURGERY

## 2020-10-30 DEVICE — PROPATEN VASCULAR GRAFT TW 7MMX80CM HEPARIN
Type: IMPLANTABLE DEVICE | Site: GROIN | Status: FUNCTIONAL
Brand: GORE PROPATEN VASCULAR GRAFT

## 2020-10-30 RX ORDER — SODIUM CHLORIDE 0.9 % (FLUSH) 0.9 %
5-40 SYRINGE (ML) INJECTION AS NEEDED
Status: DISCONTINUED | OUTPATIENT
Start: 2020-10-30 | End: 2020-10-30 | Stop reason: HOSPADM

## 2020-10-30 RX ORDER — MIDAZOLAM HYDROCHLORIDE 1 MG/ML
1 INJECTION, SOLUTION INTRAMUSCULAR; INTRAVENOUS AS NEEDED
Status: DISCONTINUED | OUTPATIENT
Start: 2020-10-30 | End: 2020-10-30 | Stop reason: HOSPADM

## 2020-10-30 RX ORDER — MIDAZOLAM HYDROCHLORIDE 1 MG/ML
0.5 INJECTION, SOLUTION INTRAMUSCULAR; INTRAVENOUS
Status: DISCONTINUED | OUTPATIENT
Start: 2020-10-30 | End: 2020-10-30 | Stop reason: HOSPADM

## 2020-10-30 RX ORDER — ROCURONIUM BROMIDE 10 MG/ML
INJECTION, SOLUTION INTRAVENOUS AS NEEDED
Status: DISCONTINUED | OUTPATIENT
Start: 2020-10-30 | End: 2020-10-30 | Stop reason: HOSPADM

## 2020-10-30 RX ORDER — FENTANYL CITRATE 50 UG/ML
25 INJECTION, SOLUTION INTRAMUSCULAR; INTRAVENOUS
Status: DISCONTINUED | OUTPATIENT
Start: 2020-10-30 | End: 2020-10-30 | Stop reason: HOSPADM

## 2020-10-30 RX ORDER — NEOSTIGMINE METHYLSULFATE 1 MG/ML
INJECTION, SOLUTION INTRAVENOUS AS NEEDED
Status: DISCONTINUED | OUTPATIENT
Start: 2020-10-30 | End: 2020-10-30 | Stop reason: HOSPADM

## 2020-10-30 RX ORDER — GLYCOPYRROLATE 0.2 MG/ML
INJECTION INTRAMUSCULAR; INTRAVENOUS AS NEEDED
Status: DISCONTINUED | OUTPATIENT
Start: 2020-10-30 | End: 2020-10-30 | Stop reason: HOSPADM

## 2020-10-30 RX ORDER — HYDROCODONE BITARTRATE AND ACETAMINOPHEN 5; 325 MG/1; MG/1
1 TABLET ORAL ONCE
Status: COMPLETED | OUTPATIENT
Start: 2020-10-30 | End: 2020-10-30

## 2020-10-30 RX ORDER — HEPARIN SODIUM 1000 [USP'U]/ML
3000 INJECTION, SOLUTION INTRAVENOUS; SUBCUTANEOUS ONCE
Status: COMPLETED | OUTPATIENT
Start: 2020-10-30 | End: 2020-10-30

## 2020-10-30 RX ORDER — SODIUM CHLORIDE, SODIUM LACTATE, POTASSIUM CHLORIDE, CALCIUM CHLORIDE 600; 310; 30; 20 MG/100ML; MG/100ML; MG/100ML; MG/100ML
125 INJECTION, SOLUTION INTRAVENOUS CONTINUOUS
Status: DISCONTINUED | OUTPATIENT
Start: 2020-10-30 | End: 2020-10-30 | Stop reason: HOSPADM

## 2020-10-30 RX ORDER — SODIUM CHLORIDE 9 MG/ML
25 INJECTION, SOLUTION INTRAVENOUS CONTINUOUS
Status: DISCONTINUED | OUTPATIENT
Start: 2020-10-30 | End: 2020-10-30 | Stop reason: HOSPADM

## 2020-10-30 RX ORDER — SODIUM CHLORIDE 0.9 % (FLUSH) 0.9 %
5-40 SYRINGE (ML) INJECTION EVERY 8 HOURS
Status: DISCONTINUED | OUTPATIENT
Start: 2020-10-30 | End: 2020-10-30 | Stop reason: HOSPADM

## 2020-10-30 RX ORDER — OXYCODONE HYDROCHLORIDE 5 MG/1
5 TABLET ORAL AS NEEDED
Status: DISCONTINUED | OUTPATIENT
Start: 2020-10-30 | End: 2020-10-30 | Stop reason: HOSPADM

## 2020-10-30 RX ORDER — FENTANYL CITRATE 50 UG/ML
50 INJECTION, SOLUTION INTRAMUSCULAR; INTRAVENOUS AS NEEDED
Status: DISCONTINUED | OUTPATIENT
Start: 2020-10-30 | End: 2020-10-30 | Stop reason: HOSPADM

## 2020-10-30 RX ORDER — DEXAMETHASONE SODIUM PHOSPHATE 4 MG/ML
INJECTION, SOLUTION INTRA-ARTICULAR; INTRALESIONAL; INTRAMUSCULAR; INTRAVENOUS; SOFT TISSUE AS NEEDED
Status: DISCONTINUED | OUTPATIENT
Start: 2020-10-30 | End: 2020-10-30 | Stop reason: HOSPADM

## 2020-10-30 RX ORDER — HEPARIN SODIUM 1000 [USP'U]/ML
INJECTION, SOLUTION INTRAVENOUS; SUBCUTANEOUS AS NEEDED
Status: DISCONTINUED | OUTPATIENT
Start: 2020-10-30 | End: 2020-10-30 | Stop reason: HOSPADM

## 2020-10-30 RX ORDER — HYDROCODONE BITARTRATE AND ACETAMINOPHEN 5; 325 MG/1; MG/1
1 TABLET ORAL
Qty: 30 TAB | Refills: 0 | Status: SHIPPED | OUTPATIENT
Start: 2020-10-30 | End: 2020-11-04

## 2020-10-30 RX ORDER — SODIUM CHLORIDE, SODIUM LACTATE, POTASSIUM CHLORIDE, CALCIUM CHLORIDE 600; 310; 30; 20 MG/100ML; MG/100ML; MG/100ML; MG/100ML
25 INJECTION, SOLUTION INTRAVENOUS CONTINUOUS
Status: DISCONTINUED | OUTPATIENT
Start: 2020-10-30 | End: 2020-10-30 | Stop reason: HOSPADM

## 2020-10-30 RX ORDER — ONDANSETRON 2 MG/ML
INJECTION INTRAMUSCULAR; INTRAVENOUS AS NEEDED
Status: DISCONTINUED | OUTPATIENT
Start: 2020-10-30 | End: 2020-10-30 | Stop reason: HOSPADM

## 2020-10-30 RX ORDER — LIDOCAINE HYDROCHLORIDE 10 MG/ML
0.1 INJECTION, SOLUTION EPIDURAL; INFILTRATION; INTRACAUDAL; PERINEURAL AS NEEDED
Status: DISCONTINUED | OUTPATIENT
Start: 2020-10-30 | End: 2020-10-30 | Stop reason: HOSPADM

## 2020-10-30 RX ORDER — PROTAMINE SULFATE 10 MG/ML
INJECTION, SOLUTION INTRAVENOUS AS NEEDED
Status: DISCONTINUED | OUTPATIENT
Start: 2020-10-30 | End: 2020-10-30 | Stop reason: HOSPADM

## 2020-10-30 RX ORDER — ACETAMINOPHEN 325 MG/1
650 TABLET ORAL ONCE
Status: DISCONTINUED | OUTPATIENT
Start: 2020-10-30 | End: 2020-10-30 | Stop reason: HOSPADM

## 2020-10-30 RX ORDER — MORPHINE SULFATE 10 MG/ML
2 INJECTION, SOLUTION INTRAMUSCULAR; INTRAVENOUS
Status: DISCONTINUED | OUTPATIENT
Start: 2020-10-30 | End: 2020-10-30 | Stop reason: HOSPADM

## 2020-10-30 RX ORDER — LIDOCAINE HYDROCHLORIDE 20 MG/ML
INJECTION, SOLUTION EPIDURAL; INFILTRATION; INTRACAUDAL; PERINEURAL AS NEEDED
Status: DISCONTINUED | OUTPATIENT
Start: 2020-10-30 | End: 2020-10-30 | Stop reason: HOSPADM

## 2020-10-30 RX ORDER — ONDANSETRON 2 MG/ML
4 INJECTION INTRAMUSCULAR; INTRAVENOUS AS NEEDED
Status: DISCONTINUED | OUTPATIENT
Start: 2020-10-30 | End: 2020-10-30 | Stop reason: HOSPADM

## 2020-10-30 RX ORDER — PROPOFOL 10 MG/ML
INJECTION, EMULSION INTRAVENOUS AS NEEDED
Status: DISCONTINUED | OUTPATIENT
Start: 2020-10-30 | End: 2020-10-30 | Stop reason: HOSPADM

## 2020-10-30 RX ORDER — HYDROMORPHONE HYDROCHLORIDE 1 MG/ML
0.2 INJECTION, SOLUTION INTRAMUSCULAR; INTRAVENOUS; SUBCUTANEOUS
Status: DISCONTINUED | OUTPATIENT
Start: 2020-10-30 | End: 2020-10-30 | Stop reason: HOSPADM

## 2020-10-30 RX ORDER — DIPHENHYDRAMINE HYDROCHLORIDE 50 MG/ML
12.5 INJECTION, SOLUTION INTRAMUSCULAR; INTRAVENOUS AS NEEDED
Status: DISCONTINUED | OUTPATIENT
Start: 2020-10-30 | End: 2020-10-30 | Stop reason: HOSPADM

## 2020-10-30 RX ADMIN — GLYCOPYRROLATE 0.1 MG: 0.2 INJECTION, SOLUTION INTRAMUSCULAR; INTRAVENOUS at 08:36

## 2020-10-30 RX ADMIN — Medication 3 AMPULE: at 07:06

## 2020-10-30 RX ADMIN — ROCURONIUM BROMIDE 20 MG: 10 INJECTION INTRAVENOUS at 08:50

## 2020-10-30 RX ADMIN — HEPARIN SODIUM 3000 UNITS: 1000 INJECTION INTRAVENOUS; SUBCUTANEOUS at 12:14

## 2020-10-30 RX ADMIN — HYDROCODONE BITARTRATE AND ACETAMINOPHEN 1 TABLET: 5; 325 TABLET ORAL at 12:21

## 2020-10-30 RX ADMIN — PROPOFOL 50 MG: 10 INJECTION, EMULSION INTRAVENOUS at 09:56

## 2020-10-30 RX ADMIN — SODIUM CHLORIDE, POTASSIUM CHLORIDE, SODIUM LACTATE AND CALCIUM CHLORIDE: 600; 310; 30; 20 INJECTION, SOLUTION INTRAVENOUS at 07:18

## 2020-10-30 RX ADMIN — WATER 2 G: 1 INJECTION INTRAMUSCULAR; INTRAVENOUS; SUBCUTANEOUS at 08:50

## 2020-10-30 RX ADMIN — PROPOFOL 100 MG: 10 INJECTION, EMULSION INTRAVENOUS at 08:46

## 2020-10-30 RX ADMIN — GLYCOPYRROLATE 0.2 MG: 0.2 INJECTION, SOLUTION INTRAMUSCULAR; INTRAVENOUS at 10:25

## 2020-10-30 RX ADMIN — PROTAMINE SULFATE 10 MG: 10 INJECTION, SOLUTION INTRAVENOUS at 10:26

## 2020-10-30 RX ADMIN — Medication 2 MG: at 10:25

## 2020-10-30 RX ADMIN — FENTANYL CITRATE 25 MCG: 50 INJECTION, SOLUTION INTRAMUSCULAR; INTRAVENOUS at 11:23

## 2020-10-30 RX ADMIN — PROTAMINE SULFATE 10 MG: 10 INJECTION, SOLUTION INTRAVENOUS at 10:23

## 2020-10-30 RX ADMIN — LIDOCAINE HYDROCHLORIDE 40 MG: 20 INJECTION, SOLUTION EPIDURAL; INFILTRATION; INTRACAUDAL; PERINEURAL at 08:46

## 2020-10-30 RX ADMIN — DEXAMETHASONE SODIUM PHOSPHATE 4 MG: 4 INJECTION, SOLUTION INTRAMUSCULAR; INTRAVENOUS at 08:50

## 2020-10-30 RX ADMIN — PROTAMINE SULFATE 10 MG: 10 INJECTION, SOLUTION INTRAVENOUS at 10:25

## 2020-10-30 RX ADMIN — SODIUM CHLORIDE 50 MCG/MIN: 900 INJECTION, SOLUTION INTRAVENOUS at 08:56

## 2020-10-30 RX ADMIN — PROTAMINE SULFATE 10 MG: 10 INJECTION, SOLUTION INTRAVENOUS at 10:21

## 2020-10-30 RX ADMIN — ONDANSETRON HYDROCHLORIDE 4 MG: 2 INJECTION, SOLUTION INTRAMUSCULAR; INTRAVENOUS at 10:23

## 2020-10-30 RX ADMIN — FENTANYL CITRATE 25 MCG: 50 INJECTION, SOLUTION INTRAMUSCULAR; INTRAVENOUS at 11:32

## 2020-10-30 RX ADMIN — PROTAMINE SULFATE 10 MG: 10 INJECTION, SOLUTION INTRAVENOUS at 10:24

## 2020-10-30 RX ADMIN — SODIUM CHLORIDE 25 ML/HR: 900 INJECTION, SOLUTION INTRAVENOUS at 07:06

## 2020-10-30 RX ADMIN — HEPARIN SODIUM 7500 UNITS: 1000 INJECTION, SOLUTION INTRAVENOUS; SUBCUTANEOUS at 09:28

## 2020-10-30 NOTE — ANESTHESIA PREPROCEDURE EVALUATION
Relevant Problems No relevant active problems Anesthetic History No history of anesthetic complications Review of Systems / Medical History Patient summary reviewed, nursing notes reviewed and pertinent labs reviewed Pulmonary Within defined limits Shortness of breath Neuro/Psych Within defined limits Cardiovascular Within defined limits Hypertension CHF 
 
CAD and PAD Exercise tolerance: >4 METS 
  
GI/Hepatic/Renal 
Within defined limits Renal disease: ESRD and dialysis Endo/Other Within defined limits Diabetes Hypothyroidism Arthritis Other Findings Physical Exam 
 
Airway Mallampati: III 
TM Distance: > 6 cm Neck ROM: normal range of motion, short neck Mouth opening: Normal 
 
 Cardiovascular Regular rate and rhythm,  S1 and S2 normal,  no murmur, click, rub, or gallop Dental 
 
Dentition: Edentulous Pulmonary Breath sounds clear to auscultation Abdominal 
GI exam deferred Other Findings Anesthetic Plan ASA: 3 Anesthesia type: general 
 
Monitoring Plan: BIS Induction: Intravenous Anesthetic plan and risks discussed with: Patient

## 2020-10-30 NOTE — PERIOP NOTES
Report called to [facility] facility [phone]. Spoke with Marsha Quintero to provide report, reviewing discharge instructions. Allowed time for questions and answers.

## 2020-10-30 NOTE — PERIOP NOTES
5843 - PT'S COVID TEST RESULTED NEG. PT STATES HAS QUARANTINED SINCE TESTING WITH THE EXCEPTION OF DIALYSIS - PT WEARS A MASK. PT DENIES S/S OF COVID - NO FEVER, COUGH, COLD, N/V. .. PT DOES EXPERIENCE SOB - ESPECIALLY AFTER DIALYSIS - PT ALSO DOES HAVE DIARRHEA FROM TIME TO TIME. PRE-OP TCHING DONE - PT VERBALIZES UNDERSTANDING. STRETCHER IN LOWEST POSITION, CB IN PLACE AND SR UP X2. WAITING SURGERY.

## 2020-10-30 NOTE — ANESTHESIA POSTPROCEDURE EVALUATION
Post-Anesthesia Evaluation and Assessment Patient: Stevie Romo MRN: [id number]  SSN: [id number] YOB: 1950  Age: 65 y.o. Sex: male Cardiovascular Function/Vital Signs Visit Vitals BP (!) 105/47 Pulse 90 Temp 36.8 °C (98.3 °F) Resp 25 Ht 5' 9\" (1.753 m) Wt 90.9 kg (200 lb 6.4 oz) SpO2 91% BMI 29.59 kg/m² Patient is status post General anesthesia for Procedure(s): LEFT FEMORAL ARTERIO VENOUS GRAFT. Nausea/Vomiting: None Postoperative hydration reviewed and adequate. Pain: 
Pain Scale 1: Numeric (0 - 10) (10/30/20 1145) Pain Intensity 1: 4 (10/30/20 1145) Managed Neurological Status:  
Neuro (WDL): Exceptions to WDL (10/30/20 1051) Neuro Neurologic State: Drowsy; Eyes open to voice(blind) (10/30/20 1051) Orientation Level: Oriented to person (10/30/20 1051) Cognition: Follows commands (10/30/20 1051) Speech: Clear (10/30/20 1051) LUE Motor Response: Purposeful (10/30/20 1051) LLE Motor Response: Purposeful (10/30/20 1051) RUE Motor Response: Purposeful (10/30/20 1051) RLE Motor Response: Purposeful (10/30/20 1051) At baseline Mental Status and Level of Consciousness: Alert and oriented to person, place, and time Pulmonary Status:  
O2 Device: Room air (10/30/20 1130) Adequate oxygenation and airway patent Complications related to anesthesia: None Post-anesthesia assessment completed. No concerns Signed By: Hesham Villavicencio MD   
 October 30, 2020 Procedure(s): LEFT FEMORAL ARTERIO VENOUS GRAFT. general 
 
<BSHSIANPOST> INITIAL Post-op Vital signs:  
Vitals Value Taken Time /47 10/30/2020 12:00 PM  
Temp 36.8 °C (98.3 °F) 10/30/2020 10:51 AM  
Pulse 88 10/30/2020 12:08 PM  
Resp 12 10/30/2020 12:08 PM  
SpO2 93 % 10/30/2020 12:08 PM  
Vitals shown include unvalidated device data.

## 2020-10-30 NOTE — PERIOP NOTES
Handoff Report from Operating Room to PACU    Report received from Claudia Mcconnell RN and Radha Kennedy CRNA regarding Naty Thrasher. .      Surgeon(s):  Marcia Sellers MD  And Procedure(s) (LRB):  LEFT FEMORAL ARTERIO VENOUS GRAFT (Left)  confirmed   with dressings discussed. Anesthesia type, drugs, patient history, complications, estimated blood loss, vital signs, intake and output, and last pain medication, lines, reversal medications and temperature were reviewed.

## 2020-10-30 NOTE — DISCHARGE INSTRUCTIONS
Patient Discharge Instructions    Alveria Boast / [id number] : 1950    Admitted 10/30/2020 Discharged: 10/30/2020     Take Home Medications       · It is important that you take the medication exactly as they are prescribed. · Keep your medication in the bottles provided by the pharmacist and keep a list of the medication names, dosages, and times to be taken in your wallet. · Do not take other medications without consulting your doctor. What to do at [address] [place] [address] [place]: Remove current bandages in 2 days then apply dry dressing to incisions daily until follow up    Recommended diet: Renal    Recommended activity: As Tolerated. No Strenuous activity or heavy lifting    If you experience any of the following symptoms redness, swelling, or drainage from incisions or rest pain, ulceration, or discoloration of left foot, please follow up with Dr Matt Vivas immediately. Follow-up with Dr Matt Vivas in 3 weeks [phone]        Information obtained by :  I understand that if any problems occur once I am at home I am to contact my physician. I understand and acknowledge receipt of the instructions indicated above.                                                                                                                                            Physician's or R.N.'s Signature                                                                  Date/Time                                                                                                                                              Patient or Representative Signature                                                          Date/Time      DISCHARGE SUMMARY from Nurse    PATIENT INSTRUCTIONS:    After general anesthesia or intravenous sedation, for 24 hours or while taking prescription Narcotics:  · Limit your activities  · Do not drive and operate hazardous machinery  · Do not make important personal or business decisions  · Do  not drink alcoholic beverages  · If you have not urinated within 8 hours after discharge, please contact your surgeon on call. Report the following to your surgeon:  · Excessive pain, swelling, redness or odor of or around the surgical area  · Temperature over 100.5  · Nausea and vomiting lasting longer than 4 hours or if unable to take medications  · Any signs of decreased circulation or nerve impairment to extremity: change in color, persistent  numbness, tingling, coldness or increase pain  · Any questions    What to do at Home:    *  Please give a list of your current medications to your Primary Care Provider. *  Please update this list whenever your medications are discontinued, doses are      changed, or new medications (including over-the-counter products) are added. *  Please carry medication information at all times in case of emergency situations. These are general instructions for a healthy lifestyle:    No smoking/ No tobacco products/ Avoid exposure to second hand smoke  Surgeon General's Warning:  Quitting smoking now greatly reduces serious risk to your health. Obesity, smoking, and sedentary lifestyle greatly increases your risk for illness    A healthy diet, regular physical exercise & weight monitoring are important for maintaining a healthy lifestyle    You may be retaining fluid if you have a history of heart failure or if you experience any of the following symptoms:  Weight gain of 3 pounds or more overnight or 5 pounds in a week, increased swelling in our hands or feet or shortness of breath while lying flat in bed. Please call your doctor as soon as you notice any of these symptoms; do not wait until your next office visit. The discharge information has been reviewed with the patient and caregiver. The patient and caregiver verbalized understanding.   Discharge medications reviewed with the patient and caregiver and appropriate educational materials and side effects teaching were provided. ___________________________________________________________________________________________________________________________________    A common side effect of anesthesia following surgery is nausea and/or vomiting. In order to decrease symptoms, it is wise to avoid foods that are high in fat, greasy foods, milk products, and spicy foods for the first 24 hours. Acceptable foods for the first 24 hours following surgery include but are not limited to:     soup   broth    toast    crackers    applesauce    bananas    mashed potatoes,   soft or scrambled eggs   oatmeal    jello    It is important to eat when taking your pain medication. This will help to prevent nausea. If possible, please try to time your meals with your medications. It is very important to stay hydrated following surgery. Sip fluids frequently while awake. Avoid acidic drinks such as citrus juices and soda for 24 hours. Carbonated beverages may cause bloating and gas. Acceptable fluids include:    - water (flavor packets may add variety)  - coffee or tea (in moderation)  - Gatorade  - Enrique-aid  - apple juice  - cranberry juice    You are encouraged to cough and deep breathe every hour when awake. This will help to prevent respiratory complications following anesthesia. You may want to hug a pillow when coughing and sneezing to add additional support to the surgical area and to decrease discomfort if you had abdominal or chest surgery. If you are discharged home with support stockings, you may remove them after 24 hours. Support stockings are used to help prevent blood clots in the legs following surgery. Please take time to review all of your Home Care Instructions and Medication Information sheets provided in your discharge packet. If you have any questions, please contact your surgeons office. Thank you. Patient Education   Narcotic-Analgesic/Acetaminophen (By mouth)   Relieves pain.    Brand Name(s): The TJX [employer] Discovery w/Codeine, Endocet, Hycet, Lorcet, Lorcet HD, Lorcet Plus, Lortab 10/325, Lortab 5/325, Lortab 7.5/325, Lortab Elixir, Norco, Percocet, Key, Trezix, Tylenol With Codeine No. 4   There may be other brand names for this medicine. When This Medicine Should Not Be Used: You should not use this medicine if you have had an allergic reaction to acetaminophen, codeine, hydrocodone, propoxyphene, or sulfites. You should not use this medicine if you have had an allergic reaction to any other opioid pain medicine. How to Use This Medicine:   Liquid, Tablet, Capsule  · Your doctor will tell you how much medicine to use. Do not use more than directed. · This medicine contains acetaminophen. Read the labels of all other medicines you are using to see if they also contain acetaminophen, or ask your doctor or pharmacist. Jessica Hernandez not use more than 4 grams (4,000 milligrams) total of acetaminophen in one day. · Drink plenty of liquids to help avoid constipation. If a dose is missed:   · Some of these medicines need to be used on a fixed schedule. If you miss a dose or forget to use your medicine, call your doctor pharmacist for instructions. Do not use extra medicine to make up for a missed dose. How to Store and Dispose of This Medicine:   · Store the medicine in a closed container at room temperature, away from heat, moisture, and direct light. Do not refrigerate or freeze the medicine. · Ask your pharmacist, doctor, or health caregiver about the best way to dispose of any outdated medicine or medicine no longer needed. · Keep all medicine out of the reach of children. Never share your medicine with anyone. Drugs and Foods to Avoid:   Ask your doctor or pharmacist before using any other medicine, including over-the-counter medicines, vitamins, and herbal products. · Make sure your doctor knows if you are using a monoamine oxidase inhibitor (MAOI) medicine, such as Eldepryl®, Marplan®, [place], or Parnate®.  Make sure your doctor knows if you are also using a medicine to treat depression, such as amitriptyline, doxepin, nortriptyline, Elavil®, Pamelor®, or Sinequan®. Make sure your doctor knows if you are taking an anticholinergic medicine, such as atropine, methscopolamine, or scopolamine. · Tell your doctor if you use anything else that makes you sleepy. Some examples are allergy medicine, narcotic pain medicine, and alcohol. · Do not drink alcohol while you are using this medicine. Warnings While Using This Medicine:   · Make sure your doctor knows if you are pregnant or breast feeding, or if you have a head injury, or other conditions that may cause an increase in intercranial pressure (pressure inside your head). Make sure your doctor knows if you have severe kidney problems or severe liver problems, or if you have hypothyroidism (lack of thyroid function). Make sure your doctor knows if you have Nam's disease (adrenal gland disease), or if you have enlarged prostate or urethral stricture. Make sure your doctor knows if you have any abdominal problems, or if you have lung disease or asthma. · This medicine may make you dizzy or drowsy. Avoid driving, using machines, or anything else that could be dangerous if you are not alert. · This medicine can be habit-forming. Do not use more than your prescribed dose. Call your doctor if you think your medicine is not working. · Tell any doctor or dentist who treats you that you are using this medicine. This medicine may affect certain medical test results. · This medicine may cause constipation, especially with long-term use. Ask your doctor if you should use a laxative to prevent and treat constipation. · When a mother is breastfeeding and takes codeine, there is a very small chance that this medicine could cause serious side effects in the baby. This is because codeine works differently in a few women, so their breast milk contains too much medicine.  If you take codeine, be alert for these signs of overdose in your nursing baby: sleeping more than usual, trouble breastfeeding, trouble breathing, or being limp and weak. Call the baby's doctor right away if you think there is a problem. If you cannot talk to the doctor, take the baby to the emergency room or call 911. Possible Side Effects While Using This Medicine:   Call your doctor right away if you notice any of these side effects:  · Allergic reaction: Itching or hives, swelling in your face or hands, swelling or tingling in your mouth or throat, chest tightness, trouble breathing  · Dizziness. · Seeing or hearing things that are not there. · Very slow heartbeat. If you notice these less serious side effects, talk with your doctor:   · Change in how much or how often you urinate. · Cold, clammy skin. · Feeling unusually anxious, excited, fearful, or tired. · Nausea, vomiting, constipation, stomach pain or upset, or heartburn. · Skin rash. · Vision changes. If you notice other side effects that you think are caused by this medicine, tell your doctor. Call your doctor for medical advice about side effects. You may report side effects to FDA at 8-142-FDA-6578  © 2017 [address] Information is for End User's use only and may not be sold, redistributed or otherwise used for commercial purposes. The above information is an  only. It is not intended as medical advice for individual conditions or treatments. Talk to your doctor, nurse or pharmacist before following any medical regimen to see if it is safe and effective for you.

## 2020-10-30 NOTE — PERIOP NOTES
For dc to [facility]. Vswnl. Reports pain as 4/10 which is tolerable per pt. Denies nausea. dsgs to L leg, groin d&i.  PPP. Groin + for thrill/bruit. Went over [employer] instructions w/POA. dc'd via transport.

## 2020-10-30 NOTE — H&P
History and Physical    Subjective:     Silke Bearden is a 65 y.o. male who needs AV access. Past Medical History:   Diagnosis Date    Adverse effect of anesthesia     [address] [place] I had a little problem with my lungs, like they tried to colapse, but they got me squared away\"    Arthritis     CAD (coronary artery disease)     Chronic kidney disease     ESRD, [facility] [place]/T,Thurs,Sat    Diabetes ([facility] [address]     type 2    Heart failure ([facility] [address] 2009    Hyperlipidemia     Hypertension     Legal blindness     Orthostatic hypotension     SOB (shortness of breath)     O2 AT 2LPM PRN    Thyroid disease     hyperparathyroidism    Vitamin D deficiency       Past Surgical History:   Procedure Laterality Date    ABDOMEN SURGERY PROC UNLISTED  11/01/2018    lower abdominal bowels removed    HX APPENDECTOMY      HX HEENT      lasik,  left optic nerve surgery    HX VASCULAR ACCESS      right dialysis access femoral    VASCULAR SURGERY PROCEDURE UNLIST      fistula r arm,      Family History   Problem Relation Age of Onset    Hypertension Mother     Diabetes Mother     Hypertension Father     Diabetes Father     Hypertension Sister       Social History     Tobacco Use    Smoking status: Former Smoker     Years: 2.00    Smokeless tobacco: Never Used   Substance Use Topics    Alcohol use: No       Prior to Admission medications    Medication Sig Start Date End Date Taking? Authorizing Provider   alclometasone (ACLOVATE) 0.05 % ointment Apply  to affected area daily as needed for Skin Irritation. apply thin layer as directed   Yes Provider, Historical   mineral oil-isopropyl myristat (EUCERIN) lotion Apply  to affected area as needed for Dry Skin. Yes Provider, Historical   HYDROcodone-acetaminophen (NORCO) 5-325 mg per tablet Take 1 Tab by mouth. Every 12 hours as needed   Yes Provider, Historical   glipiZIDE SR (Glucotrol XL) 5 mg CR tablet Take 5 mg by mouth daily.    Yes Provider, Historical   ascorbic acid, vitamin C, (Vitamin C) 500 mg tablet Take 500 mg by mouth daily. Yes Provider, Historical   ergocalciferol, vitamin D2, (VITAMIN D2 PO) Take 50 mcg by mouth daily. Yes Provider, Historical   atorvastatin (LIPITOR) 10 mg tablet Take 20 mg by mouth nightly. Yes Provider, Historical   loperamide (IMODIUM) 2 mg capsule Take 2 mg by mouth every four (4) hours as needed for Diarrhea. Yes Provider, Historical   b complex-vitamin c-folic acid (NEPHROCAPS) 1 mg capsule Take 1 Cap by mouth daily. Yes Provider, Historical   acetaminophen (TYLENOL EXTRA STRENGTH) 500 mg tablet Take 1,000 mg by mouth every six (6) hours as needed for Pain. Yes Provider, Historical   aspirin 81 mg tablet Take 81 mg by mouth daily. Yes Provider, Historical   pollen extracts (PROSTAT PO) Take 30 mL by mouth daily. Provider, Historical   tiZANidine (ZANAFLEX) 2 mg tablet Take 2 mg by mouth two (2) times a day. Provider, Historical   midodrine (PROAMITINE) 10 mg tablet Take 10 mg by mouth three (3) times daily. Indications: a feeling of dizziness upon standing due to a drop in blood pressure    Provider, Historical   ondansetron hcl (ZOFRAN) 4 mg tablet Take 4 mg by mouth every six (6) hours as needed for Nausea. Provider, Historical   phenyleph/pramoxin/glycr/w.pet (PREPARATION H MAXIMUM STRENGTH RE) Insert  into rectum. PREPARATION H CREAM 5-14/4%. INSERT 1 APPLICATION RECTALLY EVERY 4 HRS AS NEEDED FOR HEMORRHOIDS. Provider, Historical   TUCKS, WITCH HAZEL, ANGEL by Apply Externally route as needed.  APPLY  TO RECTAL AREA TOPICALLY AY EVERY 8 HRS AS NEEDED FOR HEMORROIDS    Provider, Historical     Allergies   Allergen Reactions    Adhesive Tape-Silicones Itching     Itchiness         Review of Systems:  Denies CP/SOB    Objective:     Physical Exam:   Visit Vitals  BP (!) 150/94 (BP 1 Location: Left arm, BP Patient Position: At rest)   Pulse 84   Temp 98.6 °F (37 °C)   Resp 20   Ht 5' 9\" (1.753 m)   Wt 90.9 kg (200 lb 6.4 oz)   SpO2 98%   BMI 29.59 kg/m²     General:  Alert, cooperative, no distress, appears stated age. Head:  Normocephalic, without obvious abnormality, atraumatic. Neck: Supple, symmetrical, trachea midline, no adenopathy, thyroid: no enlargement/tenderness/nodules, no carotid bruit and no JVD. Lungs:   Clear to auscultation bilaterally. Heart:  Regular rate and rhythm, S1, S2 normal, no murmur, click, rub or gallop. Abdomen:   Soft, non-tender. Bowel sounds normal. No masses,  No organomegaly. Extremities: Extremities normal, atraumatic, no cyanosis or edema. Pulses: 2+ and symmetric all extremities. Neurologic: Normal strength, sensation throughout.        Assessment:     ESRD    Plan:     Left femoral AVG    Signed By: Philip Mckoy MD     October 30, 2020

## 2020-11-17 NOTE — OP NOTES
[address] 
OPERATIVE REPORT Name:  Loretta Wyatt 
MR#:  [id number] :  1950 ACCOUNT #:  [de-identified] DATE OF SERVICE:  10/30/2020 PREOPERATIVE DIAGNOSIS:  End-stage renal disease. POSTOPERATIVE DIAGNOSIS:  End-stage renal disease. PROCEDURE PERFORMED:  Left femoral arteriovenous graft. SURGEON:  Diana Rangel MD 
 
ASSISTANT:  None. ANESTHESIA:  General. 
 
COMPLICATIONS:  None. SPECIMENS REMOVED:  None. IMPLANTS:  7 mm Propaten graft. ESTIMATED BLOOD LOSS:  100 mL. DRAINS:  None. INDICATIONS:  The patient is a 57-year-old male with end-stage renal disease and limited options for dialysis access. He presents for left femoral graft insertion. PROCEDURE:  After informed consent was obtained, the patient was given preoperative intravenous antibiotics within 1 hour of the incision. He was taken to the operating room and after induction of adequate general anesthesia, the left groin and left thigh were prepped and draped as a sterile field and the skin was covered with Margette Peals. A transverse incision was made in the left groin and the common femoral artery and common femoral vein were dissected free and encircled with vessel loops. The patient was systemically heparinized. An end-to-side anastomosis was created between the common femoral artery and a 7-mm [place] Propaten graft using running 5-0 [place]-Robert suture. When this was completed, the anastomosis was hemostatic. The graft was tunneled in a loop configuration on the anterior surface of the thigh using three small counter incisions to assist with tunneling to avoid any kinking or twisting. The graft was oriented such that the arterial side is lateral and the venous side is medial.  An end-to-side anastomosis was then performed between the graft and the common femoral vein using running 5-0 [place]-Roebrt suture.   When this was done, flow was released to the circuit and there was a good palpable thrill and both anastomoses were hemostatic. The wounds were irrigated with antibiotic irrigation. The groin wound was closed with 2-0 and then 3-0 Vicryl suture and skin staples. The three small counter incisions were closed with 3-0 Vicryl suture and skin staples. Dry dressings were applied. The patient was extubated and transferred to the PACU in stable condition. All counts were correct. Nica Hernandez MD 
 
 
[id number] 
D:  11/16/2020 23:02 
T:  11/17/2020 3:10 
JOB #:  [id number]

## 2021-01-01 ENCOUNTER — CLINICAL SUPPORT (OUTPATIENT)
Dept: CARDIOLOGY CLINIC | Age: 71
End: 2021-01-01
Payer: MEDICARE

## 2021-01-01 ENCOUNTER — ANCILLARY PROCEDURE (OUTPATIENT)
Dept: CARDIOLOGY CLINIC | Age: 71
End: 2021-01-01
Payer: MEDICARE

## 2021-01-01 ENCOUNTER — DOCUMENTATION ONLY (OUTPATIENT)
Dept: CARDIOLOGY CLINIC | Age: 71
End: 2021-01-01

## 2021-01-01 ENCOUNTER — TELEPHONE (OUTPATIENT)
Dept: CARDIOLOGY CLINIC | Age: 71
End: 2021-01-01

## 2021-01-01 ENCOUNTER — ANESTHESIA EVENT (OUTPATIENT)
Dept: SURGERY | Age: 71
DRG: 239 | End: 2021-01-01
Payer: MEDICARE

## 2021-01-01 ENCOUNTER — APPOINTMENT (OUTPATIENT)
Dept: CT IMAGING | Age: 71
DRG: 239 | End: 2021-01-01
Attending: STUDENT IN AN ORGANIZED HEALTH CARE EDUCATION/TRAINING PROGRAM
Payer: MEDICARE

## 2021-01-01 ENCOUNTER — ANESTHESIA (OUTPATIENT)
Dept: SURGERY | Age: 71
DRG: 239 | End: 2021-01-01
Payer: MEDICARE

## 2021-01-01 ENCOUNTER — CLINICAL SUPPORT (OUTPATIENT)
Dept: CARDIOLOGY CLINIC | Age: 71
End: 2021-01-01

## 2021-01-01 ENCOUNTER — APPOINTMENT (OUTPATIENT)
Dept: CT IMAGING | Age: 71
DRG: 239 | End: 2021-01-01
Attending: EMERGENCY MEDICINE
Payer: MEDICARE

## 2021-01-01 ENCOUNTER — OFFICE VISIT (OUTPATIENT)
Dept: CARDIOLOGY CLINIC | Age: 71
End: 2021-01-01
Payer: MEDICARE

## 2021-01-01 ENCOUNTER — HOSPITAL ENCOUNTER (OUTPATIENT)
Age: 71
Setting detail: OUTPATIENT SURGERY
Discharge: HOME | End: 2021-09-01
Attending: INTERNAL MEDICINE | Admitting: INTERNAL MEDICINE
Payer: MEDICARE

## 2021-01-01 ENCOUNTER — HOSPITAL ENCOUNTER (INPATIENT)
Age: 71
LOS: 12 days | Discharge: NURSING, REHAB OR LONG-TERM CARE | DRG: 239 | End: 2021-11-25
Attending: EMERGENCY MEDICINE | Admitting: STUDENT IN AN ORGANIZED HEALTH CARE EDUCATION/TRAINING PROGRAM
Payer: MEDICARE

## 2021-01-01 ENCOUNTER — APPOINTMENT (OUTPATIENT)
Dept: INTERVENTIONAL RADIOLOGY/VASCULAR | Age: 71
DRG: 239 | End: 2021-01-01
Attending: HOSPITALIST
Payer: MEDICARE

## 2021-01-01 ENCOUNTER — PATIENT OUTREACH (OUTPATIENT)
Dept: CASE MANAGEMENT | Age: 71
End: 2021-01-01

## 2021-01-01 VITALS
DIASTOLIC BLOOD PRESSURE: 51 MMHG | OXYGEN SATURATION: 95 % | WEIGHT: 198.19 LBS | BODY MASS INDEX: 29.36 KG/M2 | HEIGHT: 69 IN | RESPIRATION RATE: 18 BRPM | SYSTOLIC BLOOD PRESSURE: 105 MMHG | TEMPERATURE: 98 F | HEART RATE: 95 BPM

## 2021-01-01 VITALS
SYSTOLIC BLOOD PRESSURE: 120 MMHG | BODY MASS INDEX: 29.62 KG/M2 | WEIGHT: 200 LBS | OXYGEN SATURATION: 98 % | RESPIRATION RATE: 14 BRPM | HEIGHT: 69 IN | DIASTOLIC BLOOD PRESSURE: 60 MMHG | HEART RATE: 85 BPM

## 2021-01-01 VITALS
BODY MASS INDEX: 37.77 KG/M2 | TEMPERATURE: 98.5 F | DIASTOLIC BLOOD PRESSURE: 69 MMHG | WEIGHT: 255 LBS | RESPIRATION RATE: 18 BRPM | SYSTOLIC BLOOD PRESSURE: 124 MMHG | HEART RATE: 81 BPM | OXYGEN SATURATION: 96 % | HEIGHT: 69 IN

## 2021-01-01 DIAGNOSIS — Z99.2 END STAGE RENAL DISEASE ON DIALYSIS (HCC): Primary | ICD-10-CM

## 2021-01-01 DIAGNOSIS — R00.1 SINUS BRADYCARDIA: ICD-10-CM

## 2021-01-01 DIAGNOSIS — R00.1 BRADYCARDIA: ICD-10-CM

## 2021-01-01 DIAGNOSIS — I96 GANGRENE OF LEFT FOOT (HCC): ICD-10-CM

## 2021-01-01 DIAGNOSIS — Z95.818 STATUS POST PLACEMENT OF IMPLANTABLE LOOP RECORDER: Primary | ICD-10-CM

## 2021-01-01 DIAGNOSIS — I10 HYPERTENSION, UNSPECIFIED TYPE: ICD-10-CM

## 2021-01-01 DIAGNOSIS — N18.6 END STAGE RENAL DISEASE ON DIALYSIS (HCC): ICD-10-CM

## 2021-01-01 DIAGNOSIS — A41.9 SEPSIS, DUE TO UNSPECIFIED ORGANISM, UNSPECIFIED WHETHER ACUTE ORGAN DYSFUNCTION PRESENT (HCC): ICD-10-CM

## 2021-01-01 DIAGNOSIS — I44.0 FIRST DEGREE AV BLOCK: ICD-10-CM

## 2021-01-01 DIAGNOSIS — Z01.812 PRE-PROCEDURE LAB EXAM: ICD-10-CM

## 2021-01-01 DIAGNOSIS — R00.1 SINUS BRADYCARDIA: Primary | ICD-10-CM

## 2021-01-01 DIAGNOSIS — I96 WET GANGRENE (HCC): Primary | ICD-10-CM

## 2021-01-01 DIAGNOSIS — Z99.2 END STAGE RENAL DISEASE ON DIALYSIS (HCC): ICD-10-CM

## 2021-01-01 DIAGNOSIS — I44.0 FIRST DEGREE AV BLOCK: Primary | ICD-10-CM

## 2021-01-01 DIAGNOSIS — N18.6 END STAGE RENAL DISEASE ON DIALYSIS (HCC): Primary | ICD-10-CM

## 2021-01-01 LAB
ABO + RH BLD: NORMAL
ABO + RH BLD: NORMAL
ALBUMIN SERPL-MCNC: 1 G/DL (ref 3.5–5)
ALBUMIN SERPL-MCNC: 1.3 G/DL (ref 3.5–5)
ALBUMIN SERPL-MCNC: 1.5 G/DL (ref 3.5–5)
ALBUMIN SERPL-MCNC: 1.7 G/DL (ref 3.5–5)
ALBUMIN SERPL-MCNC: 2 G/DL (ref 3.5–5)
ALBUMIN SERPL-MCNC: 2.1 G/DL (ref 3.5–5)
ALBUMIN SERPL-MCNC: 2.2 G/DL (ref 3.5–5)
ALBUMIN SERPL-MCNC: 2.4 G/DL (ref 3.5–5)
ALBUMIN/GLOB SERPL: 0.3 {RATIO} (ref 1.1–2.2)
ALBUMIN/GLOB SERPL: 1 {RATIO} (ref 1.1–2.2)
ALP SERPL-CCNC: 169 U/L (ref 45–117)
ALP SERPL-CCNC: 67 U/L (ref 45–117)
ALT SERPL-CCNC: 35 U/L (ref 12–78)
ALT SERPL-CCNC: 54 U/L (ref 12–78)
ANION GAP SERPL CALC-SCNC: 3 MMOL/L (ref 5–15)
ANION GAP SERPL CALC-SCNC: 3 MMOL/L (ref 5–15)
ANION GAP SERPL CALC-SCNC: 4 MMOL/L (ref 5–15)
ANION GAP SERPL CALC-SCNC: 5 MMOL/L (ref 5–15)
ANION GAP SERPL CALC-SCNC: 6 MMOL/L (ref 5–15)
ANION GAP SERPL CALC-SCNC: 7 MMOL/L (ref 5–15)
ANION GAP SERPL CALC-SCNC: 8 MMOL/L (ref 5–15)
ANION GAP SERPL CALC-SCNC: 9 MMOL/L (ref 5–15)
APTT PPP: 45.7 SEC (ref 22.1–31)
AST SERPL-CCNC: 27 U/L (ref 15–37)
AST SERPL-CCNC: 31 U/L (ref 15–37)
ATRIAL RATE: 88 BPM
BACTERIA SPEC CULT: ABNORMAL
BACTERIA SPEC CULT: ABNORMAL
BACTERIA SPEC CULT: NORMAL
BASOPHILS # BLD: 0 K/UL (ref 0–0.1)
BASOPHILS # BLD: 0 K/UL (ref 0–0.1)
BASOPHILS # BLD: 0.1 K/UL (ref 0–0.1)
BASOPHILS # BLD: 0.2 K/UL (ref 0–0.1)
BASOPHILS NFR BLD: 0 % (ref 0–1)
BASOPHILS NFR BLD: 1 % (ref 0–1)
BASOPHILS NFR BLD: 2 % (ref 0–1)
BILIRUB SERPL-MCNC: 0.3 MG/DL (ref 0.2–1)
BILIRUB SERPL-MCNC: 0.4 MG/DL (ref 0.2–1)
BLD PROD TYP BPU: NORMAL
BLOOD GROUP ANTIBODIES SERPL: NORMAL
BLOOD GROUP ANTIBODIES SERPL: NORMAL
BPU ID: NORMAL
BUN SERPL-MCNC: 11 MG/DL (ref 6–20)
BUN SERPL-MCNC: 11 MG/DL (ref 6–20)
BUN SERPL-MCNC: 12 MG/DL (ref 6–20)
BUN SERPL-MCNC: 12 MG/DL (ref 6–20)
BUN SERPL-MCNC: 14 MG/DL (ref 6–20)
BUN SERPL-MCNC: 15 MG/DL (ref 6–20)
BUN SERPL-MCNC: 17 MG/DL (ref 6–20)
BUN SERPL-MCNC: 19 MG/DL (ref 6–20)
BUN SERPL-MCNC: 19 MG/DL (ref 6–20)
BUN SERPL-MCNC: 23 MG/DL (ref 6–20)
BUN SERPL-MCNC: 24 MG/DL (ref 6–20)
BUN SERPL-MCNC: 26 MG/DL (ref 6–20)
BUN SERPL-MCNC: 38 MG/DL (ref 6–20)
BUN SERPL-MCNC: 46 MG/DL (ref 6–20)
BUN SERPL-MCNC: 8 MG/DL (ref 6–20)
BUN/CREAT SERPL: 3 (ref 12–20)
BUN/CREAT SERPL: 4 (ref 12–20)
BUN/CREAT SERPL: 6 (ref 12–20)
BUN/CREAT SERPL: 6 (ref 12–20)
BUN/CREAT SERPL: 7 (ref 12–20)
CALCIUM SERPL-MCNC: 6.9 MG/DL (ref 8.5–10.1)
CALCIUM SERPL-MCNC: 7.3 MG/DL (ref 8.5–10.1)
CALCIUM SERPL-MCNC: 7.5 MG/DL (ref 8.5–10.1)
CALCIUM SERPL-MCNC: 7.5 MG/DL (ref 8.5–10.1)
CALCIUM SERPL-MCNC: 7.9 MG/DL (ref 8.5–10.1)
CALCIUM SERPL-MCNC: 7.9 MG/DL (ref 8.5–10.1)
CALCIUM SERPL-MCNC: 8 MG/DL (ref 8.5–10.1)
CALCIUM SERPL-MCNC: 8.1 MG/DL (ref 8.5–10.1)
CALCIUM SERPL-MCNC: 8.1 MG/DL (ref 8.5–10.1)
CALCIUM SERPL-MCNC: 8.2 MG/DL (ref 8.5–10.1)
CALCIUM SERPL-MCNC: 8.4 MG/DL (ref 8.5–10.1)
CALCULATED P AXIS, ECG09: 43 DEGREES
CALCULATED R AXIS, ECG10: -24 DEGREES
CALCULATED T AXIS, ECG11: 84 DEGREES
CHLORIDE SERPL-SCNC: 100 MMOL/L (ref 97–108)
CHLORIDE SERPL-SCNC: 104 MMOL/L (ref 97–108)
CHLORIDE SERPL-SCNC: 104 MMOL/L (ref 97–108)
CHLORIDE SERPL-SCNC: 105 MMOL/L (ref 97–108)
CHLORIDE SERPL-SCNC: 105 MMOL/L (ref 97–108)
CHLORIDE SERPL-SCNC: 106 MMOL/L (ref 97–108)
CHLORIDE SERPL-SCNC: 107 MMOL/L (ref 97–108)
CHLORIDE SERPL-SCNC: 107 MMOL/L (ref 97–108)
CHLORIDE SERPL-SCNC: 112 MMOL/L (ref 97–108)
CHLORIDE SERPL-SCNC: 97 MMOL/L (ref 97–108)
CHLORIDE SERPL-SCNC: 99 MMOL/L (ref 97–108)
CO2 SERPL-SCNC: 23 MMOL/L (ref 21–32)
CO2 SERPL-SCNC: 25 MMOL/L (ref 21–32)
CO2 SERPL-SCNC: 26 MMOL/L (ref 21–32)
CO2 SERPL-SCNC: 28 MMOL/L (ref 21–32)
CO2 SERPL-SCNC: 28 MMOL/L (ref 21–32)
CO2 SERPL-SCNC: 29 MMOL/L (ref 21–32)
CO2 SERPL-SCNC: 30 MMOL/L (ref 21–32)
CO2 SERPL-SCNC: 31 MMOL/L (ref 21–32)
CO2 SERPL-SCNC: 34 MMOL/L (ref 21–32)
COVID-19 RAPID TEST, COVR: NOT DETECTED
CREAT SERPL-MCNC: 2.67 MG/DL (ref 0.7–1.3)
CREAT SERPL-MCNC: 3.06 MG/DL (ref 0.7–1.3)
CREAT SERPL-MCNC: 3.33 MG/DL (ref 0.7–1.3)
CREAT SERPL-MCNC: 3.34 MG/DL (ref 0.7–1.3)
CREAT SERPL-MCNC: 3.4 MG/DL (ref 0.7–1.3)
CREAT SERPL-MCNC: 3.68 MG/DL (ref 0.7–1.3)
CREAT SERPL-MCNC: 3.99 MG/DL (ref 0.7–1.3)
CREAT SERPL-MCNC: 4.04 MG/DL (ref 0.7–1.3)
CREAT SERPL-MCNC: 4.08 MG/DL (ref 0.7–1.3)
CREAT SERPL-MCNC: 4.11 MG/DL (ref 0.7–1.3)
CREAT SERPL-MCNC: 4.47 MG/DL (ref 0.7–1.3)
CREAT SERPL-MCNC: 4.48 MG/DL (ref 0.7–1.3)
CREAT SERPL-MCNC: 4.49 MG/DL (ref 0.7–1.3)
CREAT SERPL-MCNC: 5.36 MG/DL (ref 0.7–1.3)
CREAT SERPL-MCNC: 6.47 MG/DL (ref 0.7–1.3)
CROSSMATCH RESULT,%XM: NORMAL
CRP SERPL-MCNC: 18.9 MG/DL (ref 0–0.6)
DIAGNOSIS, 93000: NORMAL
DIFFERENTIAL METHOD BLD: ABNORMAL
EOSINOPHIL # BLD: 0.1 K/UL (ref 0–0.4)
EOSINOPHIL # BLD: 0.2 K/UL (ref 0–0.4)
EOSINOPHIL # BLD: 0.3 K/UL (ref 0–0.4)
EOSINOPHIL # BLD: 0.4 K/UL (ref 0–0.4)
EOSINOPHIL # BLD: 0.4 K/UL (ref 0–0.4)
EOSINOPHIL # BLD: 0.5 K/UL (ref 0–0.4)
EOSINOPHIL # BLD: 0.5 K/UL (ref 0–0.4)
EOSINOPHIL NFR BLD: 1 % (ref 0–7)
EOSINOPHIL NFR BLD: 2 % (ref 0–7)
EOSINOPHIL NFR BLD: 3 % (ref 0–7)
EOSINOPHIL NFR BLD: 4 % (ref 0–7)
EOSINOPHIL NFR BLD: 4 % (ref 0–7)
ERYTHROCYTE [DISTWIDTH] IN BLOOD BY AUTOMATED COUNT: 14.6 % (ref 11.5–14.5)
ERYTHROCYTE [DISTWIDTH] IN BLOOD BY AUTOMATED COUNT: 14.7 % (ref 11.5–14.5)
ERYTHROCYTE [DISTWIDTH] IN BLOOD BY AUTOMATED COUNT: 14.7 % (ref 11.5–14.5)
ERYTHROCYTE [DISTWIDTH] IN BLOOD BY AUTOMATED COUNT: 14.8 % (ref 11.5–14.5)
ERYTHROCYTE [DISTWIDTH] IN BLOOD BY AUTOMATED COUNT: 16.3 % (ref 11.5–14.5)
ERYTHROCYTE [DISTWIDTH] IN BLOOD BY AUTOMATED COUNT: 16.5 % (ref 11.5–14.5)
ERYTHROCYTE [DISTWIDTH] IN BLOOD BY AUTOMATED COUNT: 16.5 % (ref 11.5–14.5)
ERYTHROCYTE [DISTWIDTH] IN BLOOD BY AUTOMATED COUNT: 16.7 % (ref 11.5–14.5)
ERYTHROCYTE [DISTWIDTH] IN BLOOD BY AUTOMATED COUNT: 16.8 % (ref 11.5–14.5)
ERYTHROCYTE [DISTWIDTH] IN BLOOD BY AUTOMATED COUNT: 16.9 % (ref 11.5–14.5)
ERYTHROCYTE [DISTWIDTH] IN BLOOD BY AUTOMATED COUNT: 17.1 % (ref 11.5–14.5)
ERYTHROCYTE [DISTWIDTH] IN BLOOD BY AUTOMATED COUNT: 17.2 % (ref 11.5–14.5)
ERYTHROCYTE [DISTWIDTH] IN BLOOD BY AUTOMATED COUNT: 17.8 % (ref 11.5–14.5)
ERYTHROCYTE [SEDIMENTATION RATE] IN BLOOD: 41 MM/HR (ref 0–20)
FIBRINOGEN PPP-MCNC: 337 MG/DL (ref 200–475)
GLOBULIN SER CALC-MCNC: 2.4 G/DL (ref 2–4)
GLOBULIN SER CALC-MCNC: 4 G/DL (ref 2–4)
GLUCOSE BLD STRIP.AUTO-MCNC: 102 MG/DL (ref 65–117)
GLUCOSE BLD STRIP.AUTO-MCNC: 110 MG/DL (ref 65–117)
GLUCOSE BLD STRIP.AUTO-MCNC: 113 MG/DL (ref 65–117)
GLUCOSE BLD STRIP.AUTO-MCNC: 114 MG/DL (ref 65–117)
GLUCOSE BLD STRIP.AUTO-MCNC: 119 MG/DL (ref 65–117)
GLUCOSE BLD STRIP.AUTO-MCNC: 121 MG/DL (ref 65–117)
GLUCOSE BLD STRIP.AUTO-MCNC: 122 MG/DL (ref 65–117)
GLUCOSE BLD STRIP.AUTO-MCNC: 122 MG/DL (ref 65–117)
GLUCOSE BLD STRIP.AUTO-MCNC: 123 MG/DL (ref 65–117)
GLUCOSE BLD STRIP.AUTO-MCNC: 125 MG/DL (ref 65–117)
GLUCOSE BLD STRIP.AUTO-MCNC: 126 MG/DL (ref 65–117)
GLUCOSE BLD STRIP.AUTO-MCNC: 128 MG/DL (ref 65–117)
GLUCOSE BLD STRIP.AUTO-MCNC: 133 MG/DL (ref 65–117)
GLUCOSE BLD STRIP.AUTO-MCNC: 133 MG/DL (ref 65–117)
GLUCOSE BLD STRIP.AUTO-MCNC: 135 MG/DL (ref 65–117)
GLUCOSE BLD STRIP.AUTO-MCNC: 141 MG/DL (ref 65–117)
GLUCOSE BLD STRIP.AUTO-MCNC: 151 MG/DL (ref 65–117)
GLUCOSE BLD STRIP.AUTO-MCNC: 158 MG/DL (ref 65–117)
GLUCOSE BLD STRIP.AUTO-MCNC: 167 MG/DL (ref 65–117)
GLUCOSE BLD STRIP.AUTO-MCNC: 173 MG/DL (ref 65–117)
GLUCOSE BLD STRIP.AUTO-MCNC: 176 MG/DL (ref 65–117)
GLUCOSE BLD STRIP.AUTO-MCNC: 178 MG/DL (ref 65–117)
GLUCOSE BLD STRIP.AUTO-MCNC: 187 MG/DL (ref 65–117)
GLUCOSE BLD STRIP.AUTO-MCNC: 189 MG/DL (ref 65–117)
GLUCOSE BLD STRIP.AUTO-MCNC: 199 MG/DL (ref 65–117)
GLUCOSE BLD STRIP.AUTO-MCNC: 201 MG/DL (ref 65–117)
GLUCOSE BLD STRIP.AUTO-MCNC: 203 MG/DL (ref 65–117)
GLUCOSE BLD STRIP.AUTO-MCNC: 205 MG/DL (ref 65–117)
GLUCOSE BLD STRIP.AUTO-MCNC: 221 MG/DL (ref 65–117)
GLUCOSE BLD STRIP.AUTO-MCNC: 229 MG/DL (ref 65–117)
GLUCOSE BLD STRIP.AUTO-MCNC: 259 MG/DL (ref 65–117)
GLUCOSE BLD STRIP.AUTO-MCNC: 275 MG/DL (ref 65–117)
GLUCOSE BLD STRIP.AUTO-MCNC: 60 MG/DL (ref 65–117)
GLUCOSE BLD STRIP.AUTO-MCNC: 67 MG/DL (ref 65–117)
GLUCOSE BLD STRIP.AUTO-MCNC: 68 MG/DL (ref 65–117)
GLUCOSE BLD STRIP.AUTO-MCNC: 72 MG/DL (ref 65–117)
GLUCOSE BLD STRIP.AUTO-MCNC: 74 MG/DL (ref 65–117)
GLUCOSE BLD STRIP.AUTO-MCNC: 83 MG/DL (ref 65–117)
GLUCOSE BLD STRIP.AUTO-MCNC: 91 MG/DL (ref 65–117)
GLUCOSE BLD STRIP.AUTO-MCNC: 91 MG/DL (ref 65–117)
GLUCOSE BLD STRIP.AUTO-MCNC: 98 MG/DL (ref 65–117)
GLUCOSE BLD STRIP.AUTO-MCNC: 99 MG/DL (ref 65–117)
GLUCOSE SERPL-MCNC: 114 MG/DL (ref 65–100)
GLUCOSE SERPL-MCNC: 117 MG/DL (ref 65–100)
GLUCOSE SERPL-MCNC: 127 MG/DL (ref 65–100)
GLUCOSE SERPL-MCNC: 141 MG/DL (ref 65–100)
GLUCOSE SERPL-MCNC: 160 MG/DL (ref 65–100)
GLUCOSE SERPL-MCNC: 162 MG/DL (ref 65–100)
GLUCOSE SERPL-MCNC: 163 MG/DL (ref 65–100)
GLUCOSE SERPL-MCNC: 175 MG/DL (ref 65–100)
GLUCOSE SERPL-MCNC: 181 MG/DL (ref 65–100)
GLUCOSE SERPL-MCNC: 182 MG/DL (ref 65–100)
GLUCOSE SERPL-MCNC: 185 MG/DL (ref 65–100)
GLUCOSE SERPL-MCNC: 225 MG/DL (ref 65–100)
GLUCOSE SERPL-MCNC: 230 MG/DL (ref 65–100)
GLUCOSE SERPL-MCNC: 60 MG/DL (ref 65–100)
GLUCOSE SERPL-MCNC: 75 MG/DL (ref 65–100)
GRAM STN SPEC: ABNORMAL
GRAM STN SPEC: ABNORMAL
HBV SURFACE AG SER QL: <0.1 INDEX
HBV SURFACE AG SER QL: NEGATIVE
HCT VFR BLD AUTO: 21 % (ref 36.6–50.3)
HCT VFR BLD AUTO: 23.2 % (ref 36.6–50.3)
HCT VFR BLD AUTO: 23.9 % (ref 36.6–50.3)
HCT VFR BLD AUTO: 23.9 % (ref 36.6–50.3)
HCT VFR BLD AUTO: 25.5 % (ref 36.6–50.3)
HCT VFR BLD AUTO: 26.1 % (ref 36.6–50.3)
HCT VFR BLD AUTO: 26.3 % (ref 36.6–50.3)
HCT VFR BLD AUTO: 26.5 % (ref 36.6–50.3)
HCT VFR BLD AUTO: 26.5 % (ref 36.6–50.3)
HCT VFR BLD AUTO: 27.1 % (ref 36.6–50.3)
HCT VFR BLD AUTO: 27.2 % (ref 36.6–50.3)
HCT VFR BLD AUTO: 27.4 % (ref 36.6–50.3)
HCT VFR BLD AUTO: 27.6 % (ref 36.6–50.3)
HCT VFR BLD AUTO: 29.4 % (ref 36.6–50.3)
HCT VFR BLD AUTO: 33.9 % (ref 36.6–50.3)
HCT VFR BLD AUTO: 34 % (ref 36.6–50.3)
HGB BLD-MCNC: 10.4 G/DL (ref 12.1–17)
HGB BLD-MCNC: 10.6 G/DL (ref 12.1–17)
HGB BLD-MCNC: 6.6 G/DL (ref 12.1–17)
HGB BLD-MCNC: 7.1 G/DL (ref 12.1–17)
HGB BLD-MCNC: 7.3 G/DL (ref 12.1–17)
HGB BLD-MCNC: 7.3 G/DL (ref 12.1–17)
HGB BLD-MCNC: 7.7 G/DL (ref 12.1–17)
HGB BLD-MCNC: 7.8 G/DL (ref 12.1–17)
HGB BLD-MCNC: 7.9 G/DL (ref 12.1–17)
HGB BLD-MCNC: 8 G/DL (ref 12.1–17)
HGB BLD-MCNC: 8.1 G/DL (ref 12.1–17)
HGB BLD-MCNC: 8.3 G/DL (ref 12.1–17)
HGB BLD-MCNC: 8.3 G/DL (ref 12.1–17)
HGB BLD-MCNC: 9.3 G/DL (ref 12.1–17)
HISTORY CHECKED?,CKHIST: NORMAL
IMM GRANULOCYTES # BLD AUTO: 0 K/UL
IMM GRANULOCYTES # BLD AUTO: 0.1 K/UL (ref 0–0.04)
IMM GRANULOCYTES # BLD AUTO: 0.2 K/UL (ref 0–0.04)
IMM GRANULOCYTES # BLD AUTO: 0.3 K/UL (ref 0–0.04)
IMM GRANULOCYTES # BLD AUTO: 0.3 K/UL (ref 0–0.04)
IMM GRANULOCYTES NFR BLD AUTO: 0 %
IMM GRANULOCYTES NFR BLD AUTO: 1 % (ref 0–0.5)
IMM GRANULOCYTES NFR BLD AUTO: 2 % (ref 0–0.5)
INR PPP: 1.3 (ref 0.9–1.1)
INR PPP: 1.6 (ref 0.9–1.1)
IRON SATN MFR SERPL: 38 % (ref 20–50)
IRON SERPL-MCNC: 34 UG/DL (ref 35–150)
LACTATE SERPL-SCNC: 0.7 MMOL/L (ref 0.4–2)
LACTATE SERPL-SCNC: 1.1 MMOL/L (ref 0.4–2)
LACTATE SERPL-SCNC: 1.5 MMOL/L (ref 0.4–2)
LYMPHOCYTES # BLD: 0.7 K/UL (ref 0.8–3.5)
LYMPHOCYTES # BLD: 0.8 K/UL (ref 0.8–3.5)
LYMPHOCYTES # BLD: 0.9 K/UL (ref 0.8–3.5)
LYMPHOCYTES # BLD: 0.9 K/UL (ref 0.8–3.5)
LYMPHOCYTES # BLD: 1 K/UL (ref 0.8–3.5)
LYMPHOCYTES # BLD: 1.1 K/UL (ref 0.8–3.5)
LYMPHOCYTES # BLD: 1.2 K/UL (ref 0.8–3.5)
LYMPHOCYTES # BLD: 1.2 K/UL (ref 0.8–3.5)
LYMPHOCYTES # BLD: 1.3 K/UL (ref 0.8–3.5)
LYMPHOCYTES # BLD: 1.4 K/UL (ref 0.8–3.5)
LYMPHOCYTES # BLD: 1.6 K/UL (ref 0.8–3.5)
LYMPHOCYTES # BLD: 1.7 K/UL (ref 0.8–3.5)
LYMPHOCYTES NFR BLD: 10 % (ref 12–49)
LYMPHOCYTES NFR BLD: 10 % (ref 12–49)
LYMPHOCYTES NFR BLD: 11 % (ref 12–49)
LYMPHOCYTES NFR BLD: 16 % (ref 12–49)
LYMPHOCYTES NFR BLD: 6 % (ref 12–49)
LYMPHOCYTES NFR BLD: 6 % (ref 12–49)
LYMPHOCYTES NFR BLD: 7 % (ref 12–49)
LYMPHOCYTES NFR BLD: 8 % (ref 12–49)
LYMPHOCYTES NFR BLD: 8 % (ref 12–49)
LYMPHOCYTES NFR BLD: 9 % (ref 12–49)
MAGNESIUM SERPL-MCNC: 1.6 MG/DL (ref 1.6–2.4)
MAGNESIUM SERPL-MCNC: 1.7 MG/DL (ref 1.6–2.4)
MCH RBC QN AUTO: 28.6 PG (ref 26–34)
MCH RBC QN AUTO: 28.8 PG (ref 26–34)
MCH RBC QN AUTO: 29.1 PG (ref 26–34)
MCH RBC QN AUTO: 29.1 PG (ref 26–34)
MCH RBC QN AUTO: 29.2 PG (ref 26–34)
MCH RBC QN AUTO: 29.2 PG (ref 26–34)
MCH RBC QN AUTO: 29.3 PG (ref 26–34)
MCH RBC QN AUTO: 29.4 PG (ref 26–34)
MCH RBC QN AUTO: 29.5 PG (ref 26–34)
MCH RBC QN AUTO: 29.7 PG (ref 26–34)
MCHC RBC AUTO-ENTMCNC: 29.1 G/DL (ref 30–36.5)
MCHC RBC AUTO-ENTMCNC: 29.3 G/DL (ref 30–36.5)
MCHC RBC AUTO-ENTMCNC: 29.8 G/DL (ref 30–36.5)
MCHC RBC AUTO-ENTMCNC: 30.5 G/DL (ref 30–36.5)
MCHC RBC AUTO-ENTMCNC: 30.5 G/DL (ref 30–36.5)
MCHC RBC AUTO-ENTMCNC: 30.6 G/DL (ref 30–36.5)
MCHC RBC AUTO-ENTMCNC: 31 G/DL (ref 30–36.5)
MCHC RBC AUTO-ENTMCNC: 31.3 G/DL (ref 30–36.5)
MCHC RBC AUTO-ENTMCNC: 31.4 G/DL (ref 30–36.5)
MCHC RBC AUTO-ENTMCNC: 31.6 G/DL (ref 30–36.5)
MCV RBC AUTO: 100.4 FL (ref 80–99)
MCV RBC AUTO: 92.9 FL (ref 80–99)
MCV RBC AUTO: 93.9 FL (ref 80–99)
MCV RBC AUTO: 94.4 FL (ref 80–99)
MCV RBC AUTO: 95.2 FL (ref 80–99)
MCV RBC AUTO: 95.4 FL (ref 80–99)
MCV RBC AUTO: 96 FL (ref 80–99)
MCV RBC AUTO: 96.3 FL (ref 80–99)
MCV RBC AUTO: 96.6 FL (ref 80–99)
MCV RBC AUTO: 98.5 FL (ref 80–99)
MCV RBC AUTO: 98.9 FL (ref 80–99)
METAMYELOCYTES NFR BLD MANUAL: 1 %
METAMYELOCYTES NFR BLD MANUAL: 1 %
MONOCYTES # BLD: 0.2 K/UL (ref 0–1)
MONOCYTES # BLD: 0.3 K/UL (ref 0–1)
MONOCYTES # BLD: 0.4 K/UL (ref 0–1)
MONOCYTES # BLD: 0.5 K/UL (ref 0–1)
MONOCYTES # BLD: 0.6 K/UL (ref 0–1)
MONOCYTES # BLD: 0.7 K/UL (ref 0–1)
MONOCYTES # BLD: 0.7 K/UL (ref 0–1)
MONOCYTES # BLD: 0.8 K/UL (ref 0–1)
MONOCYTES # BLD: 0.8 K/UL (ref 0–1)
MONOCYTES # BLD: 0.9 K/UL (ref 0–1)
MONOCYTES # BLD: 1 K/UL (ref 0–1)
MONOCYTES # BLD: 1.1 K/UL (ref 0–1)
MONOCYTES NFR BLD: 2 % (ref 5–13)
MONOCYTES NFR BLD: 3 % (ref 5–13)
MONOCYTES NFR BLD: 3 % (ref 5–13)
MONOCYTES NFR BLD: 4 % (ref 5–13)
MONOCYTES NFR BLD: 5 % (ref 5–13)
MONOCYTES NFR BLD: 5 % (ref 5–13)
MONOCYTES NFR BLD: 6 % (ref 5–13)
MONOCYTES NFR BLD: 7 % (ref 5–13)
MONOCYTES NFR BLD: 7 % (ref 5–13)
MYELOCYTES NFR BLD MANUAL: 2 %
MYELOCYTES NFR BLD MANUAL: 3 %
NEUTS BAND NFR BLD MANUAL: 4 % (ref 0–6)
NEUTS SEG # BLD: 10.3 K/UL (ref 1.8–8)
NEUTS SEG # BLD: 10.6 K/UL (ref 1.8–8)
NEUTS SEG # BLD: 10.7 K/UL (ref 1.8–8)
NEUTS SEG # BLD: 11.8 K/UL (ref 1.8–8)
NEUTS SEG # BLD: 12.6 K/UL (ref 1.8–8)
NEUTS SEG # BLD: 12.7 K/UL (ref 1.8–8)
NEUTS SEG # BLD: 13.3 K/UL (ref 1.8–8)
NEUTS SEG # BLD: 17.4 K/UL (ref 1.8–8)
NEUTS SEG # BLD: 7.2 K/UL (ref 1.8–8)
NEUTS SEG # BLD: 7.4 K/UL (ref 1.8–8)
NEUTS SEG # BLD: 8.7 K/UL (ref 1.8–8)
NEUTS SEG # BLD: 9.7 K/UL (ref 1.8–8)
NEUTS SEG NFR BLD: 67 % (ref 32–75)
NEUTS SEG NFR BLD: 78 % (ref 32–75)
NEUTS SEG NFR BLD: 78 % (ref 32–75)
NEUTS SEG NFR BLD: 79 % (ref 32–75)
NEUTS SEG NFR BLD: 80 % (ref 32–75)
NEUTS SEG NFR BLD: 80 % (ref 32–75)
NEUTS SEG NFR BLD: 83 % (ref 32–75)
NEUTS SEG NFR BLD: 84 % (ref 32–75)
NEUTS SEG NFR BLD: 84 % (ref 32–75)
NEUTS SEG NFR BLD: 86 % (ref 32–75)
NEUTS SEG NFR BLD: 86 % (ref 32–75)
NEUTS SEG NFR BLD: 89 % (ref 32–75)
NRBC # BLD: 0 K/UL (ref 0–0.01)
NRBC # BLD: 0.02 K/UL (ref 0–0.01)
NRBC # BLD: 0.04 K/UL (ref 0–0.01)
NRBC # BLD: 0.05 K/UL (ref 0–0.01)
NRBC # BLD: 0.15 K/UL (ref 0–0.01)
NRBC # BLD: 0.15 K/UL (ref 0–0.01)
NRBC # BLD: 0.18 K/UL (ref 0–0.01)
NRBC # BLD: 0.22 K/UL (ref 0–0.01)
NRBC # BLD: 0.27 K/UL (ref 0–0.01)
NRBC BLD-RTO: 0 PER 100 WBC
NRBC BLD-RTO: 0.2 PER 100 WBC
NRBC BLD-RTO: 0.3 PER 100 WBC
NRBC BLD-RTO: 0.5 PER 100 WBC
NRBC BLD-RTO: 1 PER 100 WBC
NRBC BLD-RTO: 1.1 PER 100 WBC
NRBC BLD-RTO: 1.5 PER 100 WBC
NRBC BLD-RTO: 1.7 PER 100 WBC
NRBC BLD-RTO: 1.9 PER 100 WBC
P-R INTERVAL, ECG05: 222 MS
PHOSPHATE SERPL-MCNC: 2.2 MG/DL (ref 2.6–4.7)
PHOSPHATE SERPL-MCNC: 2.3 MG/DL (ref 2.6–4.7)
PHOSPHATE SERPL-MCNC: 2.6 MG/DL (ref 2.6–4.7)
PHOSPHATE SERPL-MCNC: 3.6 MG/DL (ref 2.6–4.7)
PHOSPHATE SERPL-MCNC: 3.7 MG/DL (ref 2.6–4.7)
PHOSPHATE SERPL-MCNC: 5.2 MG/DL (ref 2.6–4.7)
PLATELET # BLD AUTO: 208 K/UL (ref 150–400)
PLATELET # BLD AUTO: 224 K/UL (ref 150–400)
PLATELET # BLD AUTO: 240 K/UL (ref 150–400)
PLATELET # BLD AUTO: 263 K/UL (ref 150–400)
PLATELET # BLD AUTO: 273 K/UL (ref 150–400)
PLATELET # BLD AUTO: 292 K/UL (ref 150–400)
PLATELET # BLD AUTO: 295 K/UL (ref 150–400)
PLATELET # BLD AUTO: 302 K/UL (ref 150–400)
PLATELET # BLD AUTO: 302 K/UL (ref 150–400)
PLATELET # BLD AUTO: 315 K/UL (ref 150–400)
PLATELET # BLD AUTO: 316 K/UL (ref 150–400)
PLATELET # BLD AUTO: 319 K/UL (ref 150–400)
PLATELET # BLD AUTO: 332 K/UL (ref 150–400)
PLATELET COMMENTS,PCOM: ABNORMAL
PLATELET COMMENTS,PCOM: ABNORMAL
PMV BLD AUTO: 9.1 FL (ref 8.9–12.9)
PMV BLD AUTO: 9.3 FL (ref 8.9–12.9)
PMV BLD AUTO: 9.3 FL (ref 8.9–12.9)
PMV BLD AUTO: 9.4 FL (ref 8.9–12.9)
PMV BLD AUTO: 9.4 FL (ref 8.9–12.9)
PMV BLD AUTO: 9.5 FL (ref 8.9–12.9)
PMV BLD AUTO: 9.6 FL (ref 8.9–12.9)
PMV BLD AUTO: 9.6 FL (ref 8.9–12.9)
PMV BLD AUTO: 9.7 FL (ref 8.9–12.9)
PMV BLD AUTO: 9.8 FL (ref 8.9–12.9)
POTASSIUM SERPL-SCNC: 2.5 MMOL/L (ref 3.5–5.1)
POTASSIUM SERPL-SCNC: 2.9 MMOL/L (ref 3.5–5.1)
POTASSIUM SERPL-SCNC: 3.2 MMOL/L (ref 3.5–5.1)
POTASSIUM SERPL-SCNC: 3.3 MMOL/L (ref 3.5–5.1)
POTASSIUM SERPL-SCNC: 3.3 MMOL/L (ref 3.5–5.1)
POTASSIUM SERPL-SCNC: 3.4 MMOL/L (ref 3.5–5.1)
POTASSIUM SERPL-SCNC: 3.5 MMOL/L (ref 3.5–5.1)
POTASSIUM SERPL-SCNC: 3.6 MMOL/L (ref 3.5–5.1)
POTASSIUM SERPL-SCNC: 3.6 MMOL/L (ref 3.5–5.1)
POTASSIUM SERPL-SCNC: 4 MMOL/L (ref 3.5–5.1)
POTASSIUM SERPL-SCNC: 4.1 MMOL/L (ref 3.5–5.1)
PROCALCITONIN SERPL-MCNC: 2.84 NG/ML
PROT SERPL-MCNC: 4.8 G/DL (ref 6.4–8.2)
PROT SERPL-MCNC: 5.3 G/DL (ref 6.4–8.2)
PROTHROMBIN TIME: 13.4 SEC (ref 9–11.1)
PROTHROMBIN TIME: 16.7 SEC (ref 9–11.1)
Q-T INTERVAL, ECG07: 342 MS
QRS DURATION, ECG06: 70 MS
QTC CALCULATION (BEZET), ECG08: 413 MS
RBC # BLD AUTO: 2.26 M/UL (ref 4.1–5.7)
RBC # BLD AUTO: 2.41 M/UL (ref 4.1–5.7)
RBC # BLD AUTO: 2.49 M/UL (ref 4.1–5.7)
RBC # BLD AUTO: 2.64 M/UL (ref 4.1–5.7)
RBC # BLD AUTO: 2.68 M/UL (ref 4.1–5.7)
RBC # BLD AUTO: 2.69 M/UL (ref 4.1–5.7)
RBC # BLD AUTO: 2.75 M/UL (ref 4.1–5.7)
RBC # BLD AUTO: 2.78 M/UL (ref 4.1–5.7)
RBC # BLD AUTO: 2.84 M/UL (ref 4.1–5.7)
RBC # BLD AUTO: 2.88 M/UL (ref 4.1–5.7)
RBC # BLD AUTO: 3.13 M/UL (ref 4.1–5.7)
RBC # BLD AUTO: 3.57 M/UL (ref 4.1–5.7)
RBC # BLD AUTO: 3.61 M/UL (ref 4.1–5.7)
RBC MORPH BLD: ABNORMAL
SERVICE CMNT-IMP: ABNORMAL
SERVICE CMNT-IMP: NORMAL
SODIUM SERPL-SCNC: 135 MMOL/L (ref 136–145)
SODIUM SERPL-SCNC: 135 MMOL/L (ref 136–145)
SODIUM SERPL-SCNC: 136 MMOL/L (ref 136–145)
SODIUM SERPL-SCNC: 138 MMOL/L (ref 136–145)
SODIUM SERPL-SCNC: 138 MMOL/L (ref 136–145)
SODIUM SERPL-SCNC: 139 MMOL/L (ref 136–145)
SODIUM SERPL-SCNC: 140 MMOL/L (ref 136–145)
SODIUM SERPL-SCNC: 142 MMOL/L (ref 136–145)
SODIUM SERPL-SCNC: 143 MMOL/L (ref 136–145)
SOURCE, COVRS: NORMAL
SPECIMEN EXP DATE BLD: NORMAL
SPECIMEN EXP DATE BLD: NORMAL
STATUS OF UNIT,%ST: NORMAL
THERAPEUTIC RANGE,PTTT: ABNORMAL SECS (ref 58–77)
TIBC SERPL-MCNC: 89 UG/DL (ref 250–450)
UNIT DIVISION, %UDIV: 0
VENTRICULAR RATE, ECG03: 88 BPM
WBC # BLD AUTO: 10.4 K/UL (ref 4.1–11.1)
WBC # BLD AUTO: 11 K/UL (ref 4.1–11.1)
WBC # BLD AUTO: 11.8 K/UL (ref 4.1–11.1)
WBC # BLD AUTO: 11.9 K/UL (ref 4.1–11.1)
WBC # BLD AUTO: 12.4 K/UL (ref 4.1–11.1)
WBC # BLD AUTO: 13.1 K/UL (ref 4.1–11.1)
WBC # BLD AUTO: 13.4 K/UL (ref 4.1–11.1)
WBC # BLD AUTO: 14 K/UL (ref 4.1–11.1)
WBC # BLD AUTO: 15.3 K/UL (ref 4.1–11.1)
WBC # BLD AUTO: 15.5 K/UL (ref 4.1–11.1)
WBC # BLD AUTO: 15.7 K/UL (ref 4.1–11.1)
WBC # BLD AUTO: 20.1 K/UL (ref 4.1–11.1)
WBC # BLD AUTO: 8.6 K/UL (ref 4.1–11.1)

## 2021-01-01 PROCEDURE — 96367 TX/PROPH/DG ADDL SEQ IV INF: CPT

## 2021-01-01 PROCEDURE — 82962 GLUCOSE BLOOD TEST: CPT

## 2021-01-01 PROCEDURE — 36415 COLL VENOUS BLD VENIPUNCTURE: CPT

## 2021-01-01 PROCEDURE — 74011250636 HC RX REV CODE- 250/636: Performed by: ANESTHESIOLOGY

## 2021-01-01 PROCEDURE — 87635 SARS-COV-2 COVID-19 AMP PRB: CPT

## 2021-01-01 PROCEDURE — 74011250636 HC RX REV CODE- 250/636: Performed by: HOSPITALIST

## 2021-01-01 PROCEDURE — 77030031139 HC SUT VCRL2 J&J -A: Performed by: SURGERY

## 2021-01-01 PROCEDURE — 74011250637 HC RX REV CODE- 250/637: Performed by: INTERNAL MEDICINE

## 2021-01-01 PROCEDURE — 74011250637 HC RX REV CODE- 250/637: Performed by: SURGERY

## 2021-01-01 PROCEDURE — 80069 RENAL FUNCTION PANEL: CPT

## 2021-01-01 PROCEDURE — 65660000001 HC RM ICU INTERMED STEPDOWN

## 2021-01-01 PROCEDURE — 85027 COMPLETE CBC AUTOMATED: CPT

## 2021-01-01 PROCEDURE — 87040 BLOOD CULTURE FOR BACTERIA: CPT

## 2021-01-01 PROCEDURE — 87205 SMEAR GRAM STAIN: CPT

## 2021-01-01 PROCEDURE — 74011250637 HC RX REV CODE- 250/637: Performed by: HOSPITALIST

## 2021-01-01 PROCEDURE — 74011250636 HC RX REV CODE- 250/636: Performed by: NURSE PRACTITIONER

## 2021-01-01 PROCEDURE — 74011250636 HC RX REV CODE- 250/636

## 2021-01-01 PROCEDURE — P9047 ALBUMIN (HUMAN), 25%, 50ML: HCPCS | Performed by: NURSE PRACTITIONER

## 2021-01-01 PROCEDURE — 85018 HEMOGLOBIN: CPT

## 2021-01-01 PROCEDURE — 74011000258 HC RX REV CODE- 258: Performed by: EMERGENCY MEDICINE

## 2021-01-01 PROCEDURE — 74011250637 HC RX REV CODE- 250/637: Performed by: ANESTHESIOLOGY

## 2021-01-01 PROCEDURE — 83540 ASSAY OF IRON: CPT

## 2021-01-01 PROCEDURE — P9047 ALBUMIN (HUMAN), 25%, 50ML: HCPCS | Performed by: HOSPITALIST

## 2021-01-01 PROCEDURE — 90935 HEMODIALYSIS ONE EVALUATION: CPT

## 2021-01-01 PROCEDURE — 80048 BASIC METABOLIC PNL TOTAL CA: CPT

## 2021-01-01 PROCEDURE — C1764 EVENT RECORDER, CARDIAC: HCPCS | Performed by: INTERNAL MEDICINE

## 2021-01-01 PROCEDURE — 74011636637 HC RX REV CODE- 636/637: Performed by: SURGERY

## 2021-01-01 PROCEDURE — 93970 EXTREMITY STUDY: CPT | Performed by: SPECIALIST

## 2021-01-01 PROCEDURE — 85025 COMPLETE CBC W/AUTO DIFF WBC: CPT

## 2021-01-01 PROCEDURE — 88307 TISSUE EXAM BY PATHOLOGIST: CPT

## 2021-01-01 PROCEDURE — 97161 PT EVAL LOW COMPLEX 20 MIN: CPT

## 2021-01-01 PROCEDURE — P9045 ALBUMIN (HUMAN), 5%, 250 ML: HCPCS | Performed by: NURSE PRACTITIONER

## 2021-01-01 PROCEDURE — 65660000000 HC RM CCU STEPDOWN

## 2021-01-01 PROCEDURE — 76060000035 HC ANESTHESIA 2 TO 2.5 HR: Performed by: SURGERY

## 2021-01-01 PROCEDURE — 1101F PT FALLS ASSESS-DOCD LE1/YR: CPT | Performed by: INTERNAL MEDICINE

## 2021-01-01 PROCEDURE — 85610 PROTHROMBIN TIME: CPT

## 2021-01-01 PROCEDURE — 93005 ELECTROCARDIOGRAM TRACING: CPT | Performed by: INTERNAL MEDICINE

## 2021-01-01 PROCEDURE — 74011000258 HC RX REV CODE- 258: Performed by: STUDENT IN AN ORGANIZED HEALTH CARE EDUCATION/TRAINING PROGRAM

## 2021-01-01 PROCEDURE — 36430 TRANSFUSION BLD/BLD COMPNT: CPT

## 2021-01-01 PROCEDURE — 77030000032 HC CUF TRNQT ZIMM -B: Performed by: SURGERY

## 2021-01-01 PROCEDURE — 85730 THROMBOPLASTIN TIME PARTIAL: CPT

## 2021-01-01 PROCEDURE — 94760 N-INVAS EAR/PLS OXIMETRY 1: CPT

## 2021-01-01 PROCEDURE — 74011250636 HC RX REV CODE- 250/636: Performed by: SURGERY

## 2021-01-01 PROCEDURE — 77030026438 HC STYL ET INTUB CARD -A: Performed by: ANESTHESIOLOGY

## 2021-01-01 PROCEDURE — 97530 THERAPEUTIC ACTIVITIES: CPT

## 2021-01-01 PROCEDURE — 74011000258 HC RX REV CODE- 258: Performed by: SURGERY

## 2021-01-01 PROCEDURE — G8427 DOCREV CUR MEDS BY ELIG CLIN: HCPCS | Performed by: INTERNAL MEDICINE

## 2021-01-01 PROCEDURE — 74011250637 HC RX REV CODE- 250/637: Performed by: STUDENT IN AN ORGANIZED HEALTH CARE EDUCATION/TRAINING PROGRAM

## 2021-01-01 PROCEDURE — 36556 INSERT NON-TUNNEL CV CATH: CPT

## 2021-01-01 PROCEDURE — P9016 RBC LEUKOCYTES REDUCED: HCPCS

## 2021-01-01 PROCEDURE — 77030002996 HC SUT SLK J&J -A: Performed by: SURGERY

## 2021-01-01 PROCEDURE — 85652 RBC SED RATE AUTOMATED: CPT

## 2021-01-01 PROCEDURE — 80053 COMPREHEN METABOLIC PANEL: CPT

## 2021-01-01 PROCEDURE — 87340 HEPATITIS B SURFACE AG IA: CPT

## 2021-01-01 PROCEDURE — 77010033678 HC OXYGEN DAILY

## 2021-01-01 PROCEDURE — 77030010507 HC ADH SKN DERMBND J&J -B: Performed by: INTERNAL MEDICINE

## 2021-01-01 PROCEDURE — 83605 ASSAY OF LACTIC ACID: CPT

## 2021-01-01 PROCEDURE — 83735 ASSAY OF MAGNESIUM: CPT

## 2021-01-01 PROCEDURE — 74011250636 HC RX REV CODE- 250/636: Performed by: INTERNAL MEDICINE

## 2021-01-01 PROCEDURE — 30233N1 TRANSFUSION OF NONAUTOLOGOUS RED BLOOD CELLS INTO PERIPHERAL VEIN, PERCUTANEOUS APPROACH: ICD-10-PCS | Performed by: INTERNAL MEDICINE

## 2021-01-01 PROCEDURE — P9045 ALBUMIN (HUMAN), 5%, 250 ML: HCPCS | Performed by: NURSE ANESTHETIST, CERTIFIED REGISTERED

## 2021-01-01 PROCEDURE — 74011000258 HC RX REV CODE- 258: Performed by: HOSPITALIST

## 2021-01-01 PROCEDURE — 0Y6D0Z3 DETACHMENT AT LEFT UPPER LEG, LOW, OPEN APPROACH: ICD-10-PCS | Performed by: SURGERY

## 2021-01-01 PROCEDURE — 65270000032 HC RM SEMIPRIVATE

## 2021-01-01 PROCEDURE — 76210000000 HC OR PH I REC 2 TO 2.5 HR: Performed by: SURGERY

## 2021-01-01 PROCEDURE — 74011250636 HC RX REV CODE- 250/636: Performed by: STUDENT IN AN ORGANIZED HEALTH CARE EDUCATION/TRAINING PROGRAM

## 2021-01-01 PROCEDURE — 74011250636 HC RX REV CODE- 250/636: Performed by: EMERGENCY MEDICINE

## 2021-01-01 PROCEDURE — G9231 DOC ESRD DIA TRANS PREG: HCPCS | Performed by: INTERNAL MEDICINE

## 2021-01-01 PROCEDURE — G8510 SCR DEP NEG, NO PLAN REQD: HCPCS | Performed by: INTERNAL MEDICINE

## 2021-01-01 PROCEDURE — 84145 PROCALCITONIN (PCT): CPT

## 2021-01-01 PROCEDURE — 33285 INSJ SUBQ CAR RHYTHM MNTR: CPT | Performed by: INTERNAL MEDICINE

## 2021-01-01 PROCEDURE — 74011000250 HC RX REV CODE- 250: Performed by: NURSE ANESTHETIST, CERTIFIED REGISTERED

## 2021-01-01 PROCEDURE — 74011250636 HC RX REV CODE- 250/636: Performed by: NURSE ANESTHETIST, CERTIFIED REGISTERED

## 2021-01-01 PROCEDURE — G0463 HOSPITAL OUTPT CLINIC VISIT: HCPCS | Performed by: INTERNAL MEDICINE

## 2021-01-01 PROCEDURE — 99204 OFFICE O/P NEW MOD 45 MIN: CPT | Performed by: INTERNAL MEDICINE

## 2021-01-01 PROCEDURE — 76010000131 HC OR TIME 2 TO 2.5 HR: Performed by: SURGERY

## 2021-01-01 PROCEDURE — 3017F COLORECTAL CA SCREEN DOC REV: CPT | Performed by: INTERNAL MEDICINE

## 2021-01-01 PROCEDURE — 77030008462 HC STPLR SKN PROX J&J -A: Performed by: SURGERY

## 2021-01-01 PROCEDURE — 96365 THER/PROPH/DIAG IV INF INIT: CPT

## 2021-01-01 PROCEDURE — 77030013079 HC BLNKT BAIR HGGR 3M -A: Performed by: ANESTHESIOLOGY

## 2021-01-01 PROCEDURE — 86923 COMPATIBILITY TEST ELECTRIC: CPT

## 2021-01-01 PROCEDURE — G8419 CALC BMI OUT NRM PARAM NOF/U: HCPCS | Performed by: INTERNAL MEDICINE

## 2021-01-01 PROCEDURE — 93010 ELECTROCARDIOGRAM REPORT: CPT | Performed by: INTERNAL MEDICINE

## 2021-01-01 PROCEDURE — 74011250636 HC RX REV CODE- 250/636: Performed by: FAMILY MEDICINE

## 2021-01-01 PROCEDURE — 87186 SC STD MICRODIL/AGAR DIL: CPT

## 2021-01-01 PROCEDURE — G8536 NO DOC ELDER MAL SCRN: HCPCS | Performed by: INTERNAL MEDICINE

## 2021-01-01 PROCEDURE — 87077 CULTURE AEROBIC IDENTIFY: CPT

## 2021-01-01 PROCEDURE — 99285 EMERGENCY DEPT VISIT HI MDM: CPT

## 2021-01-01 PROCEDURE — 88311 DECALCIFY TISSUE: CPT

## 2021-01-01 PROCEDURE — 73700 CT LOWER EXTREMITY W/O DYE: CPT

## 2021-01-01 PROCEDURE — 93005 ELECTROCARDIOGRAM TRACING: CPT

## 2021-01-01 PROCEDURE — 77030042556 HC PNCL CAUT -B: Performed by: SURGERY

## 2021-01-01 PROCEDURE — 74011000250 HC RX REV CODE- 250: Performed by: INTERNAL MEDICINE

## 2021-01-01 PROCEDURE — 77030008684 HC TU ET CUF COVD -B: Performed by: ANESTHESIOLOGY

## 2021-01-01 PROCEDURE — 99220 PR INITIAL OBSERVATION CARE/DAY 70 MINUTES: CPT | Performed by: INTERNAL MEDICINE

## 2021-01-01 PROCEDURE — 02HV33Z INSERTION OF INFUSION DEVICE INTO SUPERIOR VENA CAVA, PERCUTANEOUS APPROACH: ICD-10-PCS | Performed by: INTERNAL MEDICINE

## 2021-01-01 PROCEDURE — 36600 WITHDRAWAL OF ARTERIAL BLOOD: CPT

## 2021-01-01 PROCEDURE — 2709999900 HC NON-CHARGEABLE SUPPLY: Performed by: SURGERY

## 2021-01-01 PROCEDURE — 77030038692 HC WND DEB SYS IRMX -B: Performed by: SURGERY

## 2021-01-01 PROCEDURE — 86140 C-REACTIVE PROTEIN: CPT

## 2021-01-01 PROCEDURE — 74011636637 HC RX REV CODE- 636/637: Performed by: INTERNAL MEDICINE

## 2021-01-01 PROCEDURE — 86901 BLOOD TYPING SEROLOGIC RH(D): CPT

## 2021-01-01 PROCEDURE — 74011000250 HC RX REV CODE- 250: Performed by: STUDENT IN AN ORGANIZED HEALTH CARE EDUCATION/TRAINING PROGRAM

## 2021-01-01 PROCEDURE — 5A1D70Z PERFORMANCE OF URINARY FILTRATION, INTERMITTENT, LESS THAN 6 HOURS PER DAY: ICD-10-PCS | Performed by: INTERNAL MEDICINE

## 2021-01-01 PROCEDURE — 93285 PRGRMG DEV EVAL SCRMS IP: CPT | Performed by: INTERNAL MEDICINE

## 2021-01-01 PROCEDURE — 2709999900 HC NON-CHARGEABLE SUPPLY: Performed by: INTERNAL MEDICINE

## 2021-01-01 PROCEDURE — 77030006835 HC BLD SAW SAG STRY -B: Performed by: SURGERY

## 2021-01-01 DEVICE — MON LNQ11 REVEAL LINQ USA FW2.0
Type: IMPLANTABLE DEVICE | Status: FUNCTIONAL
Brand: REVEAL LINQ™

## 2021-01-01 RX ORDER — SODIUM CHLORIDE 9 MG/ML
250 INJECTION, SOLUTION INTRAVENOUS AS NEEDED
Status: DISCONTINUED | OUTPATIENT
Start: 2021-01-01 | End: 2021-01-01 | Stop reason: HOSPADM

## 2021-01-01 RX ORDER — ONDANSETRON 2 MG/ML
4 INJECTION INTRAMUSCULAR; INTRAVENOUS AS NEEDED
Status: DISCONTINUED | OUTPATIENT
Start: 2021-01-01 | End: 2021-01-01 | Stop reason: HOSPADM

## 2021-01-01 RX ORDER — OXYCODONE HYDROCHLORIDE 5 MG/1
5-10 TABLET ORAL
Status: DISCONTINUED | OUTPATIENT
Start: 2021-01-01 | End: 2021-01-01

## 2021-01-01 RX ORDER — BALSAM PERU/CASTOR OIL
OINTMENT (GRAM) TOPICAL 3 TIMES DAILY
Status: DISCONTINUED | OUTPATIENT
Start: 2021-01-01 | End: 2021-01-01 | Stop reason: HOSPADM

## 2021-01-01 RX ORDER — DEXTROSE MONOHYDRATE 50 MG/ML
50 INJECTION, SOLUTION INTRAVENOUS CONTINUOUS
Status: DISCONTINUED | OUTPATIENT
Start: 2021-01-01 | End: 2021-01-01

## 2021-01-01 RX ORDER — FLUDROCORTISONE ACETATE 0.1 MG/1
0.1 TABLET ORAL DAILY
Status: DISCONTINUED | OUTPATIENT
Start: 2021-01-01 | End: 2021-01-01

## 2021-01-01 RX ORDER — SODIUM CHLORIDE 0.9 % (FLUSH) 0.9 %
5-40 SYRINGE (ML) INJECTION AS NEEDED
Status: CANCELLED | OUTPATIENT
Start: 2021-01-01

## 2021-01-01 RX ORDER — SODIUM CHLORIDE 0.9 % (FLUSH) 0.9 %
5-40 SYRINGE (ML) INJECTION EVERY 8 HOURS
Status: CANCELLED | OUTPATIENT
Start: 2021-01-01

## 2021-01-01 RX ORDER — SUCCINYLCHOLINE CHLORIDE 20 MG/ML
INJECTION INTRAMUSCULAR; INTRAVENOUS AS NEEDED
Status: DISCONTINUED | OUTPATIENT
Start: 2021-01-01 | End: 2021-01-01 | Stop reason: HOSPADM

## 2021-01-01 RX ORDER — DICLOFENAC SODIUM 10 MG/G
2 GEL TOPICAL 4 TIMES DAILY
COMMUNITY
End: 2021-01-01

## 2021-01-01 RX ORDER — POTASSIUM CHLORIDE 750 MG/1
40 TABLET, FILM COATED, EXTENDED RELEASE ORAL
Status: COMPLETED | OUTPATIENT
Start: 2021-01-01 | End: 2021-01-01

## 2021-01-01 RX ORDER — SODIUM CHLORIDE 0.9 % (FLUSH) 0.9 %
5-40 SYRINGE (ML) INJECTION AS NEEDED
Status: DISCONTINUED | OUTPATIENT
Start: 2021-01-01 | End: 2021-01-01 | Stop reason: HOSPADM

## 2021-01-01 RX ORDER — OXYCODONE HYDROCHLORIDE 5 MG/1
10 TABLET ORAL
Status: DISCONTINUED | OUTPATIENT
Start: 2021-01-01 | End: 2021-01-01 | Stop reason: HOSPADM

## 2021-01-01 RX ORDER — FENTANYL CITRATE 50 UG/ML
25 INJECTION, SOLUTION INTRAMUSCULAR; INTRAVENOUS
Status: DISCONTINUED | OUTPATIENT
Start: 2021-01-01 | End: 2021-01-01 | Stop reason: HOSPADM

## 2021-01-01 RX ORDER — LIDOCAINE HYDROCHLORIDE 10 MG/ML
INJECTION INFILTRATION; PERINEURAL AS NEEDED
Status: DISCONTINUED | OUTPATIENT
Start: 2021-01-01 | End: 2021-01-01 | Stop reason: HOSPADM

## 2021-01-01 RX ORDER — CLOPIDOGREL BISULFATE 75 MG/1
75 TABLET ORAL DAILY
Status: DISCONTINUED | OUTPATIENT
Start: 2021-01-01 | End: 2021-01-01 | Stop reason: HOSPADM

## 2021-01-01 RX ORDER — ALBUMIN HUMAN 50 G/1000ML
SOLUTION INTRAVENOUS AS NEEDED
Status: DISCONTINUED | OUTPATIENT
Start: 2021-01-01 | End: 2021-01-01 | Stop reason: HOSPADM

## 2021-01-01 RX ORDER — DEXTROSE 50 % IN WATER (D50W) INTRAVENOUS SYRINGE
12.5-25 AS NEEDED
Status: DISCONTINUED | OUTPATIENT
Start: 2021-01-01 | End: 2021-01-01 | Stop reason: HOSPADM

## 2021-01-01 RX ORDER — LIDOCAINE HYDROCHLORIDE 10 MG/ML
0.1 INJECTION, SOLUTION EPIDURAL; INFILTRATION; INTRACAUDAL; PERINEURAL AS NEEDED
Status: DISCONTINUED | OUTPATIENT
Start: 2021-01-01 | End: 2021-01-01 | Stop reason: HOSPADM

## 2021-01-01 RX ORDER — OXYCODONE HYDROCHLORIDE 5 MG/1
5 TABLET ORAL
Status: DISCONTINUED | OUTPATIENT
Start: 2021-01-01 | End: 2021-01-01

## 2021-01-01 RX ORDER — FLUDROCORTISONE ACETATE 0.1 MG/1
0.1 TABLET ORAL 2 TIMES DAILY
Qty: 60 TABLET | Refills: 0 | Status: SHIPPED
Start: 2021-01-01

## 2021-01-01 RX ORDER — LIDOCAINE HYDROCHLORIDE 20 MG/ML
20 INJECTION, SOLUTION INFILTRATION; PERINEURAL
Status: COMPLETED | OUTPATIENT
Start: 2021-01-01 | End: 2021-01-01

## 2021-01-01 RX ORDER — CHLORHEXIDINE GLUCONATE 4 G/100ML
SOLUTION TOPICAL
Qty: 1 BOTTLE | Refills: 0 | Status: SHIPPED | OUTPATIENT
Start: 2021-01-01 | End: 2021-01-01

## 2021-01-01 RX ORDER — FENTANYL CITRATE 50 UG/ML
100 INJECTION, SOLUTION INTRAMUSCULAR; INTRAVENOUS
Status: DISCONTINUED | OUTPATIENT
Start: 2021-01-01 | End: 2021-01-01

## 2021-01-01 RX ORDER — SODIUM CHLORIDE 9 MG/ML
25 INJECTION, SOLUTION INTRAVENOUS CONTINUOUS
Status: DISCONTINUED | OUTPATIENT
Start: 2021-01-01 | End: 2021-01-01 | Stop reason: HOSPADM

## 2021-01-01 RX ORDER — ATORVASTATIN CALCIUM 20 MG/1
20 TABLET, FILM COATED ORAL
Status: DISCONTINUED | OUTPATIENT
Start: 2021-01-01 | End: 2021-01-01 | Stop reason: HOSPADM

## 2021-01-01 RX ORDER — SODIUM CHLORIDE, SODIUM LACTATE, POTASSIUM CHLORIDE, CALCIUM CHLORIDE 600; 310; 30; 20 MG/100ML; MG/100ML; MG/100ML; MG/100ML
75 INJECTION, SOLUTION INTRAVENOUS CONTINUOUS
Status: DISCONTINUED | OUTPATIENT
Start: 2021-01-01 | End: 2021-01-01 | Stop reason: HOSPADM

## 2021-01-01 RX ORDER — PREGABALIN 25 MG/1
25 CAPSULE ORAL DAILY
Status: DISCONTINUED | OUTPATIENT
Start: 2021-01-01 | End: 2021-01-01 | Stop reason: HOSPADM

## 2021-01-01 RX ORDER — MAGNESIUM SULFATE HEPTAHYDRATE 40 MG/ML
2 INJECTION, SOLUTION INTRAVENOUS ONCE
Status: COMPLETED | OUTPATIENT
Start: 2021-01-01 | End: 2021-01-01

## 2021-01-01 RX ORDER — PANTOPRAZOLE SODIUM 20 MG/1
20 TABLET, DELAYED RELEASE ORAL DAILY
COMMUNITY
End: 2021-01-01

## 2021-01-01 RX ORDER — WITCH HAZEL 2 G/1
CLOTH TOPICAL
COMMUNITY
End: 2021-01-01

## 2021-01-01 RX ORDER — TRAMADOL HYDROCHLORIDE 50 MG/1
50 TABLET ORAL
Status: DISCONTINUED | OUTPATIENT
Start: 2021-01-01 | End: 2021-01-01 | Stop reason: HOSPADM

## 2021-01-01 RX ORDER — MORPHINE SULFATE 2 MG/ML
2 INJECTION, SOLUTION INTRAMUSCULAR; INTRAVENOUS
Status: DISCONTINUED | OUTPATIENT
Start: 2021-01-01 | End: 2021-01-01 | Stop reason: HOSPADM

## 2021-01-01 RX ORDER — CLOPIDOGREL BISULFATE 75 MG/1
75 TABLET ORAL DAILY
Qty: 30 TABLET | Refills: 0 | Status: SHIPPED
Start: 2021-01-01

## 2021-01-01 RX ORDER — SODIUM CHLORIDE 9 MG/ML
INJECTION, SOLUTION INTRAVENOUS
Status: DISCONTINUED | OUTPATIENT
Start: 2021-01-01 | End: 2021-01-01 | Stop reason: HOSPADM

## 2021-01-01 RX ORDER — ACETAMINOPHEN 325 MG/1
650 TABLET ORAL ONCE
Status: COMPLETED | OUTPATIENT
Start: 2021-01-01 | End: 2021-01-01

## 2021-01-01 RX ORDER — SODIUM CHLORIDE 0.9 % (FLUSH) 0.9 %
5-40 SYRINGE (ML) INJECTION EVERY 8 HOURS
Status: DISCONTINUED | OUTPATIENT
Start: 2021-01-01 | End: 2021-01-01 | Stop reason: HOSPADM

## 2021-01-01 RX ORDER — MIDAZOLAM HYDROCHLORIDE 1 MG/ML
1 INJECTION, SOLUTION INTRAMUSCULAR; INTRAVENOUS AS NEEDED
Status: DISCONTINUED | OUTPATIENT
Start: 2021-01-01 | End: 2021-01-01 | Stop reason: HOSPADM

## 2021-01-01 RX ORDER — LIDOCAINE HYDROCHLORIDE 20 MG/ML
INJECTION, SOLUTION EPIDURAL; INFILTRATION; INTRACAUDAL; PERINEURAL AS NEEDED
Status: DISCONTINUED | OUTPATIENT
Start: 2021-01-01 | End: 2021-01-01 | Stop reason: HOSPADM

## 2021-01-01 RX ORDER — SENNOSIDES 8.6 MG/1
CAPSULE, GELATIN COATED ORAL
COMMUNITY
End: 2021-01-01

## 2021-01-01 RX ORDER — ALBUMIN HUMAN 250 G/1000ML
25 SOLUTION INTRAVENOUS
Status: DISCONTINUED | OUTPATIENT
Start: 2021-01-01 | End: 2021-01-01 | Stop reason: HOSPADM

## 2021-01-01 RX ORDER — ALBUMIN HUMAN 250 G/1000ML
25 SOLUTION INTRAVENOUS EVERY 6 HOURS
Status: COMPLETED | OUTPATIENT
Start: 2021-01-01 | End: 2021-01-01

## 2021-01-01 RX ORDER — FLUDROCORTISONE ACETATE 0.1 MG/1
0.1 TABLET ORAL 2 TIMES DAILY
Status: DISCONTINUED | OUTPATIENT
Start: 2021-01-01 | End: 2021-01-01 | Stop reason: HOSPADM

## 2021-01-01 RX ORDER — MAGNESIUM SULFATE 100 %
4 CRYSTALS MISCELLANEOUS AS NEEDED
Status: DISCONTINUED | OUTPATIENT
Start: 2021-01-01 | End: 2021-01-01 | Stop reason: HOSPADM

## 2021-01-01 RX ORDER — POLYETHYLENE GLYCOL 3350 17 G/17G
17 POWDER, FOR SOLUTION ORAL DAILY
COMMUNITY
End: 2021-01-01

## 2021-01-01 RX ORDER — FLUDROCORTISONE ACETATE 0.1 MG/1
0.1 TABLET ORAL DAILY
COMMUNITY
End: 2021-01-01

## 2021-01-01 RX ORDER — MIDODRINE HYDROCHLORIDE 10 MG/1
20 TABLET ORAL EVERY 8 HOURS
Qty: 180 TABLET | Refills: 0 | Status: SHIPPED
Start: 2021-01-01 | End: 2021-12-25

## 2021-01-01 RX ORDER — MIDODRINE HYDROCHLORIDE 5 MG/1
10 TABLET ORAL
Status: DISCONTINUED | OUTPATIENT
Start: 2021-01-01 | End: 2021-01-01

## 2021-01-01 RX ORDER — PREGABALIN 25 MG/1
25 CAPSULE ORAL
COMMUNITY
End: 2021-01-01

## 2021-01-01 RX ORDER — TIZANIDINE 2 MG/1
2 TABLET ORAL 2 TIMES DAILY
Status: DISCONTINUED | OUTPATIENT
Start: 2021-01-01 | End: 2021-01-01 | Stop reason: HOSPADM

## 2021-01-01 RX ORDER — FENTANYL CITRATE 50 UG/ML
50 INJECTION, SOLUTION INTRAMUSCULAR; INTRAVENOUS AS NEEDED
Status: DISCONTINUED | OUTPATIENT
Start: 2021-01-01 | End: 2021-01-01 | Stop reason: HOSPADM

## 2021-01-01 RX ORDER — ALBUMIN HUMAN 50 G/1000ML
25 SOLUTION INTRAVENOUS ONCE
Status: COMPLETED | OUTPATIENT
Start: 2021-01-01 | End: 2021-01-01

## 2021-01-01 RX ORDER — CLOPIDOGREL BISULFATE 75 MG/1
TABLET ORAL
COMMUNITY
End: 2021-01-01

## 2021-01-01 RX ORDER — LOPERAMIDE HYDROCHLORIDE 2 MG/1
CAPSULE ORAL
COMMUNITY
End: 2021-01-01

## 2021-01-01 RX ORDER — FENTANYL CITRATE 50 UG/ML
50 INJECTION, SOLUTION INTRAMUSCULAR; INTRAVENOUS
Status: DISCONTINUED | OUTPATIENT
Start: 2021-01-01 | End: 2021-01-01 | Stop reason: HOSPADM

## 2021-01-01 RX ORDER — FENTANYL CITRATE 50 UG/ML
INJECTION, SOLUTION INTRAMUSCULAR; INTRAVENOUS AS NEEDED
Status: DISCONTINUED | OUTPATIENT
Start: 2021-01-01 | End: 2021-01-01 | Stop reason: HOSPADM

## 2021-01-01 RX ORDER — HYDROMORPHONE HYDROCHLORIDE 1 MG/ML
0.2 INJECTION, SOLUTION INTRAMUSCULAR; INTRAVENOUS; SUBCUTANEOUS
Status: DISCONTINUED | OUTPATIENT
Start: 2021-01-01 | End: 2021-01-01 | Stop reason: HOSPADM

## 2021-01-01 RX ORDER — DIPHENHYDRAMINE HYDROCHLORIDE 50 MG/ML
12.5 INJECTION, SOLUTION INTRAMUSCULAR; INTRAVENOUS AS NEEDED
Status: ACTIVE | OUTPATIENT
Start: 2021-01-01 | End: 2021-01-01

## 2021-01-01 RX ORDER — LIDOCAINE HYDROCHLORIDE 20 MG/ML
20 INJECTION, SOLUTION INFILTRATION; PERINEURAL
Status: ACTIVE | OUTPATIENT
Start: 2021-01-01 | End: 2021-01-01

## 2021-01-01 RX ORDER — MIDODRINE HYDROCHLORIDE 5 MG/1
15 TABLET ORAL EVERY 8 HOURS
Status: DISCONTINUED | OUTPATIENT
Start: 2021-01-01 | End: 2021-01-01

## 2021-01-01 RX ORDER — PHENYLEPHRINE HCL IN 0.9% NACL 0.4MG/10ML
SYRINGE (ML) INTRAVENOUS AS NEEDED
Status: DISCONTINUED | OUTPATIENT
Start: 2021-01-01 | End: 2021-01-01 | Stop reason: HOSPADM

## 2021-01-01 RX ORDER — OXYCODONE HYDROCHLORIDE 5 MG/1
2.5 TABLET ORAL
COMMUNITY
End: 2021-01-01

## 2021-01-01 RX ORDER — ROPIVACAINE HYDROCHLORIDE 5 MG/ML
30 INJECTION, SOLUTION EPIDURAL; INFILTRATION; PERINEURAL ONCE
Status: DISCONTINUED | OUTPATIENT
Start: 2021-01-01 | End: 2021-01-01 | Stop reason: HOSPADM

## 2021-01-01 RX ORDER — PREGABALIN 25 MG/1
25 CAPSULE ORAL DAILY
Qty: 30 CAPSULE | Refills: 0 | Status: SHIPPED | OUTPATIENT
Start: 2021-01-01

## 2021-01-01 RX ORDER — MIDAZOLAM HYDROCHLORIDE 1 MG/ML
0.5 INJECTION, SOLUTION INTRAMUSCULAR; INTRAVENOUS
Status: DISCONTINUED | OUTPATIENT
Start: 2021-01-01 | End: 2021-01-01 | Stop reason: HOSPADM

## 2021-01-01 RX ORDER — POTASSIUM CHLORIDE 29.8 MG/ML
20 INJECTION INTRAVENOUS
Status: COMPLETED | OUTPATIENT
Start: 2021-01-01 | End: 2021-01-01

## 2021-01-01 RX ORDER — SODIUM CHLORIDE, SODIUM LACTATE, POTASSIUM CHLORIDE, CALCIUM CHLORIDE 600; 310; 30; 20 MG/100ML; MG/100ML; MG/100ML; MG/100ML
125 INJECTION, SOLUTION INTRAVENOUS CONTINUOUS
Status: DISCONTINUED | OUTPATIENT
Start: 2021-01-01 | End: 2021-01-01 | Stop reason: HOSPADM

## 2021-01-01 RX ORDER — PROPOFOL 10 MG/ML
INJECTION, EMULSION INTRAVENOUS AS NEEDED
Status: DISCONTINUED | OUTPATIENT
Start: 2021-01-01 | End: 2021-01-01 | Stop reason: HOSPADM

## 2021-01-01 RX ORDER — ACETAMINOPHEN 325 MG/1
650 TABLET ORAL
Status: CANCELLED | OUTPATIENT
Start: 2021-01-01

## 2021-01-01 RX ORDER — PANTOPRAZOLE SODIUM 20 MG/1
20 TABLET, DELAYED RELEASE ORAL DAILY
Qty: 30 TABLET | Refills: 0 | Status: SHIPPED
Start: 2021-01-01

## 2021-01-01 RX ORDER — FENTANYL CITRATE 50 UG/ML
INJECTION, SOLUTION INTRAMUSCULAR; INTRAVENOUS
Status: COMPLETED
Start: 2021-01-01 | End: 2021-01-01

## 2021-01-01 RX ORDER — PANTOPRAZOLE SODIUM 20 MG/1
20 TABLET, DELAYED RELEASE ORAL DAILY
Status: DISCONTINUED | OUTPATIENT
Start: 2021-01-01 | End: 2021-01-01 | Stop reason: HOSPADM

## 2021-01-01 RX ORDER — EPINEPHRINE 1 MG/ML
INJECTION, SOLUTION, CONCENTRATE INTRAVENOUS AS NEEDED
Status: DISCONTINUED | OUTPATIENT
Start: 2021-01-01 | End: 2021-01-01 | Stop reason: HOSPADM

## 2021-01-01 RX ORDER — CLINDAMYCIN HYDROCHLORIDE 150 MG/1
150 CAPSULE ORAL 3 TIMES DAILY
Qty: 9 CAPSULE | Refills: 0 | Status: SHIPPED | OUTPATIENT
Start: 2021-01-01 | End: 2021-01-01

## 2021-01-01 RX ORDER — CLINDAMYCIN PHOSPHATE 600 MG/50ML
600 INJECTION INTRAVENOUS ONCE
Status: COMPLETED | OUTPATIENT
Start: 2021-01-01 | End: 2021-01-01

## 2021-01-01 RX ORDER — MIDODRINE HYDROCHLORIDE 5 MG/1
20 TABLET ORAL EVERY 8 HOURS
Status: DISCONTINUED | OUTPATIENT
Start: 2021-01-01 | End: 2021-01-01 | Stop reason: HOSPADM

## 2021-01-01 RX ADMIN — MIDODRINE HYDROCHLORIDE 10 MG: 5 TABLET ORAL at 09:07

## 2021-01-01 RX ADMIN — PANTOPRAZOLE SODIUM 20 MG: 20 TABLET, DELAYED RELEASE ORAL at 10:08

## 2021-01-01 RX ADMIN — Medication: at 09:07

## 2021-01-01 RX ADMIN — PIPERACILLIN SODIUM AND TAZOBACTAM SODIUM 3.38 G: 3; .375 INJECTION, POWDER, LYOPHILIZED, FOR SOLUTION INTRAVENOUS at 19:34

## 2021-01-01 RX ADMIN — Medication: at 21:13

## 2021-01-01 RX ADMIN — PANTOPRAZOLE SODIUM 20 MG: 20 TABLET, DELAYED RELEASE ORAL at 08:48

## 2021-01-01 RX ADMIN — MIDODRINE HYDROCHLORIDE 20 MG: 5 TABLET ORAL at 13:58

## 2021-01-01 RX ADMIN — PIPERACILLIN SODIUM AND TAZOBACTAM SODIUM 3.38 G: 3; .375 INJECTION, POWDER, LYOPHILIZED, FOR SOLUTION INTRAVENOUS at 04:07

## 2021-01-01 RX ADMIN — APIXABAN 5 MG: 5 TABLET, FILM COATED ORAL at 17:50

## 2021-01-01 RX ADMIN — TIZANIDINE 2 MG: 2 TABLET ORAL at 17:50

## 2021-01-01 RX ADMIN — HUMAN INSULIN 2 UNITS: 100 INJECTION, SOLUTION SUBCUTANEOUS at 10:11

## 2021-01-01 RX ADMIN — SODIUM CHLORIDE 25 ML/HR: 9 INJECTION, SOLUTION INTRAVENOUS at 19:36

## 2021-01-01 RX ADMIN — ALBUMIN (HUMAN) 25 G: 0.25 INJECTION, SOLUTION INTRAVENOUS at 07:02

## 2021-01-01 RX ADMIN — ATORVASTATIN CALCIUM 20 MG: 20 TABLET, FILM COATED ORAL at 21:07

## 2021-01-01 RX ADMIN — PIPERACILLIN SODIUM AND TAZOBACTAM SODIUM 3.38 G: 3; .375 INJECTION, POWDER, LYOPHILIZED, FOR SOLUTION INTRAVENOUS at 05:05

## 2021-01-01 RX ADMIN — OXYCODONE 10 MG: 5 TABLET ORAL at 06:13

## 2021-01-01 RX ADMIN — MIDODRINE HYDROCHLORIDE 20 MG: 5 TABLET ORAL at 21:12

## 2021-01-01 RX ADMIN — PANTOPRAZOLE SODIUM 20 MG: 20 TABLET, DELAYED RELEASE ORAL at 10:02

## 2021-01-01 RX ADMIN — HUMAN INSULIN 3 UNITS: 100 INJECTION, SOLUTION SUBCUTANEOUS at 08:47

## 2021-01-01 RX ADMIN — MIDODRINE HYDROCHLORIDE 20 MG: 5 TABLET ORAL at 22:46

## 2021-01-01 RX ADMIN — TIZANIDINE 2 MG: 2 TABLET ORAL at 17:21

## 2021-01-01 RX ADMIN — PANTOPRAZOLE SODIUM 20 MG: 20 TABLET, DELAYED RELEASE ORAL at 08:58

## 2021-01-01 RX ADMIN — HUMAN INSULIN 2 UNITS: 100 INJECTION, SOLUTION SUBCUTANEOUS at 17:50

## 2021-01-01 RX ADMIN — POTASSIUM CHLORIDE 20 MEQ: 400 INJECTION, SOLUTION INTRAVENOUS at 11:15

## 2021-01-01 RX ADMIN — EPINEPHRINE 0.01 MG: 1 INJECTION INTRAMUSCULAR; INTRAVENOUS; SUBCUTANEOUS at 20:46

## 2021-01-01 RX ADMIN — Medication: at 22:00

## 2021-01-01 RX ADMIN — PREGABALIN 25 MG: 25 CAPSULE ORAL at 09:32

## 2021-01-01 RX ADMIN — FLUDROCORTISONE ACETATE 0.1 MG: 0.1 TABLET ORAL at 17:37

## 2021-01-01 RX ADMIN — PIPERACILLIN SODIUM AND TAZOBACTAM SODIUM 3.38 G: 3; .375 INJECTION, POWDER, LYOPHILIZED, FOR SOLUTION INTRAVENOUS at 03:31

## 2021-01-01 RX ADMIN — TRAMADOL HYDROCHLORIDE 50 MG: 50 TABLET, COATED ORAL at 10:31

## 2021-01-01 RX ADMIN — TRAMADOL HYDROCHLORIDE 50 MG: 50 TABLET, COATED ORAL at 02:51

## 2021-01-01 RX ADMIN — OXYCODONE 10 MG: 5 TABLET ORAL at 06:44

## 2021-01-01 RX ADMIN — PANTOPRAZOLE SODIUM 20 MG: 20 TABLET, DELAYED RELEASE ORAL at 10:10

## 2021-01-01 RX ADMIN — PANTOPRAZOLE SODIUM 20 MG: 20 TABLET, DELAYED RELEASE ORAL at 10:24

## 2021-01-01 RX ADMIN — FENTANYL CITRATE 25 MCG: 50 INJECTION INTRAMUSCULAR; INTRAVENOUS at 22:45

## 2021-01-01 RX ADMIN — FENTANYL CITRATE 25 MCG: 0.05 INJECTION, SOLUTION INTRAMUSCULAR; INTRAVENOUS at 15:10

## 2021-01-01 RX ADMIN — MAGNESIUM SULFATE 2 G: 2 INJECTION INTRAVENOUS at 13:42

## 2021-01-01 RX ADMIN — HUMAN INSULIN 3 UNITS: 100 INJECTION, SOLUTION SUBCUTANEOUS at 10:02

## 2021-01-01 RX ADMIN — FENTANYL CITRATE 25 MCG: 50 INJECTION, SOLUTION INTRAMUSCULAR; INTRAVENOUS at 21:30

## 2021-01-01 RX ADMIN — CLOPIDOGREL BISULFATE 75 MG: 75 TABLET ORAL at 12:59

## 2021-01-01 RX ADMIN — MIDODRINE HYDROCHLORIDE 20 MG: 5 TABLET ORAL at 13:20

## 2021-01-01 RX ADMIN — TIZANIDINE 2 MG: 2 TABLET ORAL at 17:09

## 2021-01-01 RX ADMIN — MIDODRINE HYDROCHLORIDE 10 MG: 5 TABLET ORAL at 14:05

## 2021-01-01 RX ADMIN — MIDODRINE HYDROCHLORIDE 20 MG: 5 TABLET ORAL at 05:17

## 2021-01-01 RX ADMIN — HUMAN INSULIN 2 UNITS: 100 INJECTION, SOLUTION SUBCUTANEOUS at 12:59

## 2021-01-01 RX ADMIN — APIXABAN 5 MG: 5 TABLET, FILM COATED ORAL at 08:58

## 2021-01-01 RX ADMIN — FLUDROCORTISONE ACETATE 0.1 MG: 0.1 TABLET ORAL at 10:02

## 2021-01-01 RX ADMIN — MIDODRINE HYDROCHLORIDE 20 MG: 5 TABLET ORAL at 06:38

## 2021-01-01 RX ADMIN — PANTOPRAZOLE SODIUM 20 MG: 20 TABLET, DELAYED RELEASE ORAL at 08:12

## 2021-01-01 RX ADMIN — PREGABALIN 25 MG: 25 CAPSULE ORAL at 10:02

## 2021-01-01 RX ADMIN — MIDODRINE HYDROCHLORIDE 10 MG: 5 TABLET ORAL at 17:12

## 2021-01-01 RX ADMIN — FLUDROCORTISONE ACETATE 0.1 MG: 0.1 TABLET ORAL at 09:31

## 2021-01-01 RX ADMIN — SODIUM CHLORIDE 25 ML/HR: 9 INJECTION, SOLUTION INTRAVENOUS at 00:12

## 2021-01-01 RX ADMIN — MIDODRINE HYDROCHLORIDE 20 MG: 5 TABLET ORAL at 23:42

## 2021-01-01 RX ADMIN — VANCOMYCIN HYDROCHLORIDE 2250 MG: 10 INJECTION, POWDER, LYOPHILIZED, FOR SOLUTION INTRAVENOUS at 02:12

## 2021-01-01 RX ADMIN — ATORVASTATIN CALCIUM 20 MG: 20 TABLET, FILM COATED ORAL at 23:42

## 2021-01-01 RX ADMIN — PANTOPRAZOLE SODIUM 20 MG: 20 TABLET, DELAYED RELEASE ORAL at 09:32

## 2021-01-01 RX ADMIN — ATORVASTATIN CALCIUM 20 MG: 20 TABLET, FILM COATED ORAL at 02:51

## 2021-01-01 RX ADMIN — Medication: at 10:03

## 2021-01-01 RX ADMIN — POTASSIUM CHLORIDE 20 MEQ: 400 INJECTION, SOLUTION INTRAVENOUS at 10:25

## 2021-01-01 RX ADMIN — PREGABALIN 25 MG: 25 CAPSULE ORAL at 08:48

## 2021-01-01 RX ADMIN — Medication: at 21:14

## 2021-01-01 RX ADMIN — ATORVASTATIN CALCIUM 20 MG: 20 TABLET, FILM COATED ORAL at 21:13

## 2021-01-01 RX ADMIN — Medication: at 17:56

## 2021-01-01 RX ADMIN — EPINEPHRINE 0.01 MG: 1 INJECTION INTRAMUSCULAR; INTRAVENOUS; SUBCUTANEOUS at 21:15

## 2021-01-01 RX ADMIN — FLUDROCORTISONE ACETATE 0.1 MG: 0.1 TABLET ORAL at 18:17

## 2021-01-01 RX ADMIN — TIZANIDINE 2 MG: 2 TABLET ORAL at 10:08

## 2021-01-01 RX ADMIN — Medication: at 08:47

## 2021-01-01 RX ADMIN — PIPERACILLIN SODIUM AND TAZOBACTAM SODIUM 3.38 G: 3; .375 INJECTION, POWDER, LYOPHILIZED, FOR SOLUTION INTRAVENOUS at 18:17

## 2021-01-01 RX ADMIN — FLUDROCORTISONE ACETATE 0.1 MG: 0.1 TABLET ORAL at 08:47

## 2021-01-01 RX ADMIN — CLINDAMYCIN PHOSPHATE 600 MG: 600 INJECTION, SOLUTION INTRAVENOUS at 00:49

## 2021-01-01 RX ADMIN — DEXTROSE MONOHYDRATE 50 ML/HR: 50 INJECTION, SOLUTION INTRAVENOUS at 07:07

## 2021-01-01 RX ADMIN — PREGABALIN 25 MG: 25 CAPSULE ORAL at 10:23

## 2021-01-01 RX ADMIN — PANTOPRAZOLE SODIUM 20 MG: 20 TABLET, DELAYED RELEASE ORAL at 08:47

## 2021-01-01 RX ADMIN — Medication 80 MCG: at 20:44

## 2021-01-01 RX ADMIN — Medication: at 09:01

## 2021-01-01 RX ADMIN — TRAMADOL HYDROCHLORIDE 50 MG: 50 TABLET, COATED ORAL at 05:37

## 2021-01-01 RX ADMIN — APIXABAN 5 MG: 5 TABLET, FILM COATED ORAL at 17:37

## 2021-01-01 RX ADMIN — MIDODRINE HYDROCHLORIDE 10 MG: 5 TABLET ORAL at 14:36

## 2021-01-01 RX ADMIN — TIZANIDINE 2 MG: 2 TABLET ORAL at 19:36

## 2021-01-01 RX ADMIN — Medication: at 08:12

## 2021-01-01 RX ADMIN — MIDODRINE HYDROCHLORIDE 20 MG: 5 TABLET ORAL at 22:05

## 2021-01-01 RX ADMIN — MIDODRINE HYDROCHLORIDE 20 MG: 5 TABLET ORAL at 21:44

## 2021-01-01 RX ADMIN — PIPERACILLIN SODIUM AND TAZOBACTAM SODIUM 3.38 G: 3; .375 INJECTION, POWDER, LYOPHILIZED, FOR SOLUTION INTRAVENOUS at 18:23

## 2021-01-01 RX ADMIN — Medication 80 MCG: at 20:42

## 2021-01-01 RX ADMIN — TIZANIDINE 2 MG: 2 TABLET ORAL at 08:48

## 2021-01-01 RX ADMIN — EPOETIN ALFA-EPBX 20000 UNITS: 20000 INJECTION, SOLUTION INTRAVENOUS; SUBCUTANEOUS at 22:47

## 2021-01-01 RX ADMIN — FLUDROCORTISONE ACETATE 0.1 MG: 0.1 TABLET ORAL at 19:33

## 2021-01-01 RX ADMIN — FLUDROCORTISONE ACETATE 0.1 MG: 0.1 TABLET ORAL at 10:23

## 2021-01-01 RX ADMIN — FLUDROCORTISONE ACETATE 0.1 MG: 0.1 TABLET ORAL at 08:58

## 2021-01-01 RX ADMIN — MIDODRINE HYDROCHLORIDE 10 MG: 5 TABLET ORAL at 19:36

## 2021-01-01 RX ADMIN — MIDODRINE HYDROCHLORIDE 20 MG: 5 TABLET ORAL at 22:20

## 2021-01-01 RX ADMIN — CLOPIDOGREL BISULFATE 75 MG: 75 TABLET ORAL at 10:10

## 2021-01-01 RX ADMIN — PIPERACILLIN SODIUM AND TAZOBACTAM SODIUM 3.38 G: 3; .375 INJECTION, POWDER, LYOPHILIZED, FOR SOLUTION INTRAVENOUS at 05:17

## 2021-01-01 RX ADMIN — ATORVASTATIN CALCIUM 20 MG: 20 TABLET, FILM COATED ORAL at 21:44

## 2021-01-01 RX ADMIN — Medication: at 17:38

## 2021-01-01 RX ADMIN — PIPERACILLIN SODIUM AND TAZOBACTAM SODIUM 3.38 G: 3; .375 INJECTION, POWDER, LYOPHILIZED, FOR SOLUTION INTRAVENOUS at 17:09

## 2021-01-01 RX ADMIN — PIPERACILLIN SODIUM AND TAZOBACTAM SODIUM 3.38 G: 3; .375 INJECTION, POWDER, LYOPHILIZED, FOR SOLUTION INTRAVENOUS at 16:16

## 2021-01-01 RX ADMIN — TRAMADOL HYDROCHLORIDE 50 MG: 50 TABLET, COATED ORAL at 08:12

## 2021-01-01 RX ADMIN — VANCOMYCIN HYDROCHLORIDE 750 MG: 750 INJECTION, POWDER, LYOPHILIZED, FOR SOLUTION INTRAVENOUS at 16:16

## 2021-01-01 RX ADMIN — ALBUMIN (HUMAN) 25 G: 0.25 INJECTION, SOLUTION INTRAVENOUS at 22:46

## 2021-01-01 RX ADMIN — ALBUMIN (HUMAN) 25 G: 0.25 INJECTION, SOLUTION INTRAVENOUS at 08:27

## 2021-01-01 RX ADMIN — OXYCODONE 10 MG: 5 TABLET ORAL at 23:08

## 2021-01-01 RX ADMIN — FLUDROCORTISONE ACETATE 0.1 MG: 0.1 TABLET ORAL at 17:50

## 2021-01-01 RX ADMIN — PIPERACILLIN AND TAZOBACTAM 3.38 G: 3; .375 INJECTION, POWDER, LYOPHILIZED, FOR SOLUTION INTRAVENOUS at 00:15

## 2021-01-01 RX ADMIN — DEXTROSE MONOHYDRATE 12.5 G: 25 INJECTION, SOLUTION INTRAVENOUS at 07:06

## 2021-01-01 RX ADMIN — FENTANYL CITRATE 25 MCG: 50 INJECTION INTRAMUSCULAR; INTRAVENOUS at 22:53

## 2021-01-01 RX ADMIN — POTASSIUM CHLORIDE 40 MEQ: 750 TABLET, FILM COATED, EXTENDED RELEASE ORAL at 13:42

## 2021-01-01 RX ADMIN — DEXTROSE MONOHYDRATE 12.5 G: 25 INJECTION, SOLUTION INTRAVENOUS at 12:38

## 2021-01-01 RX ADMIN — FLUDROCORTISONE ACETATE 0.1 MG: 0.1 TABLET ORAL at 17:17

## 2021-01-01 RX ADMIN — PREGABALIN 25 MG: 25 CAPSULE ORAL at 08:58

## 2021-01-01 RX ADMIN — EPOETIN ALFA-EPBX 20000 UNITS: 20000 INJECTION, SOLUTION INTRAVENOUS; SUBCUTANEOUS at 21:51

## 2021-01-01 RX ADMIN — Medication 80 MCG: at 20:46

## 2021-01-01 RX ADMIN — PIPERACILLIN SODIUM AND TAZOBACTAM SODIUM 3.38 G: 3; .375 INJECTION, POWDER, LYOPHILIZED, FOR SOLUTION INTRAVENOUS at 06:06

## 2021-01-01 RX ADMIN — MIDODRINE HYDROCHLORIDE 10 MG: 5 TABLET ORAL at 10:12

## 2021-01-01 RX ADMIN — ATORVASTATIN CALCIUM 20 MG: 20 TABLET, FILM COATED ORAL at 21:20

## 2021-01-01 RX ADMIN — TIZANIDINE 2 MG: 2 TABLET ORAL at 17:17

## 2021-01-01 RX ADMIN — PIPERACILLIN SODIUM AND TAZOBACTAM SODIUM 3.38 G: 3; .375 INJECTION, POWDER, LYOPHILIZED, FOR SOLUTION INTRAVENOUS at 15:14

## 2021-01-01 RX ADMIN — Medication: at 21:46

## 2021-01-01 RX ADMIN — TRAMADOL HYDROCHLORIDE 50 MG: 50 TABLET, COATED ORAL at 02:43

## 2021-01-01 RX ADMIN — ALBUMIN (HUMAN) 250 ML: 12.5 INJECTION, SOLUTION INTRAVENOUS at 21:09

## 2021-01-01 RX ADMIN — Medication: at 09:00

## 2021-01-01 RX ADMIN — TRAMADOL HYDROCHLORIDE 50 MG: 50 TABLET, COATED ORAL at 09:28

## 2021-01-01 RX ADMIN — MIDODRINE HYDROCHLORIDE 20 MG: 5 TABLET ORAL at 06:25

## 2021-01-01 RX ADMIN — MIDODRINE HYDROCHLORIDE 20 MG: 5 TABLET ORAL at 15:13

## 2021-01-01 RX ADMIN — MIDODRINE HYDROCHLORIDE 20 MG: 5 TABLET ORAL at 06:08

## 2021-01-01 RX ADMIN — APIXABAN 5 MG: 5 TABLET, FILM COATED ORAL at 17:56

## 2021-01-01 RX ADMIN — TIZANIDINE 2 MG: 2 TABLET ORAL at 08:47

## 2021-01-01 RX ADMIN — PROPOFOL 20 MG: 10 INJECTION, EMULSION INTRAVENOUS at 21:30

## 2021-01-01 RX ADMIN — HUMAN INSULIN 3 UNITS: 100 INJECTION, SOLUTION SUBCUTANEOUS at 17:37

## 2021-01-01 RX ADMIN — FLUDROCORTISONE ACETATE 0.1 MG: 0.1 TABLET ORAL at 08:48

## 2021-01-01 RX ADMIN — PROPOFOL 80 MG: 10 INJECTION, EMULSION INTRAVENOUS at 20:38

## 2021-01-01 RX ADMIN — Medication 40 MCG/MIN: at 21:00

## 2021-01-01 RX ADMIN — ALBUMIN (HUMAN) 25 G: 0.25 INJECTION, SOLUTION INTRAVENOUS at 17:16

## 2021-01-01 RX ADMIN — ATORVASTATIN CALCIUM 20 MG: 20 TABLET, FILM COATED ORAL at 22:20

## 2021-01-01 RX ADMIN — EPOETIN ALFA-EPBX 20000 UNITS: 20000 INJECTION, SOLUTION INTRAVENOUS; SUBCUTANEOUS at 04:34

## 2021-01-01 RX ADMIN — ALBUMIN (HUMAN) 25 G: 0.25 INJECTION, SOLUTION INTRAVENOUS at 08:57

## 2021-01-01 RX ADMIN — PREGABALIN 25 MG: 25 CAPSULE ORAL at 08:47

## 2021-01-01 RX ADMIN — PIPERACILLIN SODIUM AND TAZOBACTAM SODIUM 3.38 G: 3; .375 INJECTION, POWDER, LYOPHILIZED, FOR SOLUTION INTRAVENOUS at 19:32

## 2021-01-01 RX ADMIN — TIZANIDINE 2 MG: 2 TABLET ORAL at 09:32

## 2021-01-01 RX ADMIN — HUMAN INSULIN 2 UNITS: 100 INJECTION, SOLUTION SUBCUTANEOUS at 07:30

## 2021-01-01 RX ADMIN — MIDODRINE HYDROCHLORIDE 10 MG: 5 TABLET ORAL at 10:08

## 2021-01-01 RX ADMIN — PREGABALIN 25 MG: 25 CAPSULE ORAL at 10:10

## 2021-01-01 RX ADMIN — ATORVASTATIN CALCIUM 20 MG: 20 TABLET, FILM COATED ORAL at 22:46

## 2021-01-01 RX ADMIN — EPOETIN ALFA-EPBX 20000 UNITS: 20000 INJECTION, SOLUTION INTRAVENOUS; SUBCUTANEOUS at 22:05

## 2021-01-01 RX ADMIN — FLUDROCORTISONE ACETATE 0.1 MG: 0.1 TABLET ORAL at 08:12

## 2021-01-01 RX ADMIN — Medication: at 18:17

## 2021-01-01 RX ADMIN — ATORVASTATIN CALCIUM 20 MG: 20 TABLET, FILM COATED ORAL at 21:12

## 2021-01-01 RX ADMIN — APIXABAN 5 MG: 5 TABLET, FILM COATED ORAL at 08:47

## 2021-01-01 RX ADMIN — SUCCINYLCHOLINE CHLORIDE 100 MG: 20 INJECTION, SOLUTION INTRAMUSCULAR; INTRAVENOUS at 20:38

## 2021-01-01 RX ADMIN — TIZANIDINE 2 MG: 2 TABLET ORAL at 10:23

## 2021-01-01 RX ADMIN — ALBUMIN (HUMAN) 25 G: 0.25 INJECTION, SOLUTION INTRAVENOUS at 22:33

## 2021-01-01 RX ADMIN — TIZANIDINE 2 MG: 2 TABLET ORAL at 09:07

## 2021-01-01 RX ADMIN — ACETAMINOPHEN 650 MG: 325 TABLET ORAL at 19:34

## 2021-01-01 RX ADMIN — PIPERACILLIN SODIUM AND TAZOBACTAM SODIUM 3.38 G: 3; .375 INJECTION, POWDER, LYOPHILIZED, FOR SOLUTION INTRAVENOUS at 17:17

## 2021-01-01 RX ADMIN — MIDODRINE HYDROCHLORIDE 10 MG: 5 TABLET ORAL at 17:49

## 2021-01-01 RX ADMIN — PREGABALIN 25 MG: 25 CAPSULE ORAL at 08:12

## 2021-01-01 RX ADMIN — PIPERACILLIN SODIUM AND TAZOBACTAM SODIUM 3.38 G: 3; .375 INJECTION, POWDER, LYOPHILIZED, FOR SOLUTION INTRAVENOUS at 04:39

## 2021-01-01 RX ADMIN — MIDODRINE HYDROCHLORIDE 20 MG: 5 TABLET ORAL at 13:00

## 2021-01-01 RX ADMIN — MIDODRINE HYDROCHLORIDE 20 MG: 5 TABLET ORAL at 14:25

## 2021-01-01 RX ADMIN — MIDODRINE HYDROCHLORIDE 10 MG: 5 TABLET ORAL at 12:46

## 2021-01-01 RX ADMIN — PIPERACILLIN SODIUM AND TAZOBACTAM SODIUM 3.38 G: 3; .375 INJECTION, POWDER, LYOPHILIZED, FOR SOLUTION INTRAVENOUS at 16:32

## 2021-01-01 RX ADMIN — MIDODRINE HYDROCHLORIDE 20 MG: 5 TABLET ORAL at 13:41

## 2021-01-01 RX ADMIN — TIZANIDINE 2 MG: 2 TABLET ORAL at 19:29

## 2021-01-01 RX ADMIN — FENTANYL CITRATE 25 MCG: 50 INJECTION, SOLUTION INTRAMUSCULAR; INTRAVENOUS at 21:08

## 2021-01-01 RX ADMIN — APIXABAN 5 MG: 5 TABLET, FILM COATED ORAL at 10:09

## 2021-01-01 RX ADMIN — MIDODRINE HYDROCHLORIDE 20 MG: 5 TABLET ORAL at 05:48

## 2021-01-01 RX ADMIN — HUMAN INSULIN 5 UNITS: 100 INJECTION, SOLUTION SUBCUTANEOUS at 17:56

## 2021-01-01 RX ADMIN — MIDODRINE HYDROCHLORIDE 20 MG: 5 TABLET ORAL at 17:17

## 2021-01-01 RX ADMIN — OXYCODONE 5 MG: 5 TABLET ORAL at 10:07

## 2021-01-01 RX ADMIN — MIDODRINE HYDROCHLORIDE 20 MG: 5 TABLET ORAL at 06:11

## 2021-01-01 RX ADMIN — PREGABALIN 25 MG: 25 CAPSULE ORAL at 10:08

## 2021-01-01 RX ADMIN — Medication: at 19:32

## 2021-01-01 RX ADMIN — MIDODRINE HYDROCHLORIDE 10 MG: 5 TABLET ORAL at 10:23

## 2021-01-01 RX ADMIN — LIDOCAINE HYDROCHLORIDE 6 ML: 20 INJECTION, SOLUTION INFILTRATION; PERINEURAL at 15:00

## 2021-01-01 RX ADMIN — EPINEPHRINE 0.01 MG: 1 INJECTION INTRAMUSCULAR; INTRAVENOUS; SUBCUTANEOUS at 20:55

## 2021-01-01 RX ADMIN — MIDODRINE HYDROCHLORIDE 20 MG: 5 TABLET ORAL at 06:06

## 2021-01-01 RX ADMIN — LIDOCAINE HYDROCHLORIDE 80 MG: 20 INJECTION, SOLUTION EPIDURAL; INFILTRATION; INTRACAUDAL; PERINEURAL at 20:38

## 2021-01-01 RX ADMIN — ALBUMIN (HUMAN) 25 G: 0.25 INJECTION, SOLUTION INTRAVENOUS at 14:26

## 2021-01-01 RX ADMIN — FLUDROCORTISONE ACETATE 0.1 MG: 0.1 TABLET ORAL at 17:56

## 2021-01-01 RX ADMIN — ALBUMIN (HUMAN) 25 G: 12.5 INJECTION, SOLUTION INTRAVENOUS at 19:52

## 2021-01-01 RX ADMIN — PIPERACILLIN SODIUM AND TAZOBACTAM SODIUM 3.38 G: 3; .375 INJECTION, POWDER, LYOPHILIZED, FOR SOLUTION INTRAVENOUS at 05:48

## 2021-01-01 RX ADMIN — HUMAN INSULIN 2 UNITS: 100 INJECTION, SOLUTION SUBCUTANEOUS at 17:16

## 2021-01-01 RX ADMIN — TRAMADOL HYDROCHLORIDE 50 MG: 50 TABLET, COATED ORAL at 04:43

## 2021-01-01 RX ADMIN — PIPERACILLIN SODIUM AND TAZOBACTAM SODIUM 3.38 G: 3; .375 INJECTION, POWDER, LYOPHILIZED, FOR SOLUTION INTRAVENOUS at 03:44

## 2021-01-01 RX ADMIN — FLUDROCORTISONE ACETATE 0.1 MG: 0.1 TABLET ORAL at 10:10

## 2021-01-01 RX ADMIN — FLUDROCORTISONE ACETATE 0.1 MG: 0.1 TABLET ORAL at 10:08

## 2021-01-01 RX ADMIN — Medication 80 MCG: at 20:38

## 2021-01-01 RX ADMIN — FLUDROCORTISONE ACETATE 0.1 MG: 0.1 TABLET ORAL at 09:07

## 2021-01-01 RX ADMIN — SODIUM CHLORIDE: 900 INJECTION, SOLUTION INTRAVENOUS at 20:20

## 2021-01-01 RX ADMIN — ATORVASTATIN CALCIUM 20 MG: 20 TABLET, FILM COATED ORAL at 22:05

## 2021-01-01 RX ADMIN — FLUDROCORTISONE ACETATE 0.1 MG: 0.1 TABLET ORAL at 19:29

## 2021-01-01 RX ADMIN — ATORVASTATIN CALCIUM 20 MG: 20 TABLET, FILM COATED ORAL at 21:21

## 2021-01-01 RX ADMIN — PREGABALIN 25 MG: 25 CAPSULE ORAL at 09:06

## 2021-01-01 RX ADMIN — Medication: at 17:50

## 2021-01-01 RX ADMIN — ATORVASTATIN CALCIUM 20 MG: 20 TABLET, FILM COATED ORAL at 03:21

## 2021-01-01 RX ADMIN — MIDODRINE HYDROCHLORIDE 15 MG: 5 TABLET ORAL at 09:28

## 2021-01-01 RX ADMIN — MIDODRINE HYDROCHLORIDE 10 MG: 5 TABLET ORAL at 17:20

## 2021-01-01 RX ADMIN — PANTOPRAZOLE SODIUM 20 MG: 20 TABLET, DELAYED RELEASE ORAL at 09:07

## 2021-01-01 RX ADMIN — PROPOFOL 50 MG: 10 INJECTION, EMULSION INTRAVENOUS at 20:39

## 2021-01-01 RX ADMIN — Medication 80 MCG: at 20:40

## 2021-05-06 NOTE — PROGRESS NOTES
Cardiac Electrophysiology OFFICE Consultation Note Subjective:  
  
Faye Beach is a 65 y.o. patient who is seen for evaluation of bradycardia and fatigue The patient is in wheelchair today and he is with his wife Feb 2021 [facility] echo reported normal LVEF mild MR AR (Dr Lily Choi) 
holter 2/2021 with PVC and NSVT 
ECG sinus bradycardia and first degree av block He has been more fatigued with slow HR and BP Dialysis accesses have been in use in all 4 extremities and chest area and current on in left leg Left femoral arteriovenous graft done by Dr Dionisio Ruiz in 2020 HR 35 bpm with hypotension at dialysis according to his wife Dr Cory Alvarez refers Patient Active Problem List  
Diagnosis Code  Hypertension I10  Renal failure N19  
 Diabetes mellitus ([facility] [address] E11.9 Current Outpatient Medications Medication Sig Dispense Refill  mineral oil-isopropyl myristat (EUCERIN) lotion Apply  to affected area as needed for Dry Skin.  glipiZIDE SR (Glucotrol XL) 5 mg CR tablet Take 5 mg by mouth daily.  pollen extracts (PROSTAT PO) Take 30 mL by mouth daily.  tiZANidine (ZANAFLEX) 2 mg tablet Take 2 mg by mouth two (2) times a day.  ascorbic acid, vitamin C, (Vitamin C) 500 mg tablet Take 500 mg by mouth daily.  ergocalciferol, vitamin D2, (VITAMIN D2 PO) Take 50 mcg by mouth daily.  midodrine (PROAMITINE) 10 mg tablet Take 10 mg by mouth three (3) times daily. Indications: a feeling of dizziness upon standing due to a drop in blood pressure  atorvastatin (LIPITOR) 10 mg tablet Take 20 mg by mouth nightly.  loperamide (IMODIUM) 2 mg capsule Take 2 mg by mouth every four (4) hours as needed for Diarrhea.  b complex-vitamin c-folic acid (NEPHROCAPS) 1 mg capsule Take 1 Cap by mouth daily.  ondansetron hcl (ZOFRAN) 4 mg tablet Take 4 mg by mouth every six (6) hours as needed for Nausea.     
 phenyleph/pramoxin/glycr/w.pet (PREPARATION H MAXIMUM STRENGTH RE) Insert  into rectum. PREPARATION H CREAM 5-14/4%. INSERT 1 APPLICATION RECTALLY EVERY 4 HRS AS NEEDED FOR HEMORRHOIDS.  TUCKS, WITCH HAZEL, EX by Apply Externally route as needed. APPLY  TO RECTAL AREA TOPICALLY AY EVERY 8 HRS AS NEEDED FOR HEMORROIDS  acetaminophen (TYLENOL EXTRA STRENGTH) 500 mg tablet Take 1,000 mg by mouth every six (6) hours as needed for Pain. Allergies Allergen Reactions  Adhesive Tape-Silicones Itching Itchiness Past Medical History:  
Diagnosis Date  Adverse effect of anesthesia [address]'s I had a little problem with my lungs, like they tried to colapse, but they got me squared away\"  Arthritis  CAD (coronary artery disease)  Chronic kidney disease ESRD, [facility] [place]/T,Thurs,Sat  Diabetes ([address] type 2  
 Heart failure ([address] 2009  Hyperlipidemia  Hypertension  Legal blindness  Orthostatic hypotension  SOB (shortness of breath) O2 AT 2LPM PRN  Thyroid disease   
 hyperparathyroidism  Vitamin D deficiency Past Surgical History:  
Procedure Laterality Date  HX APPENDECTOMY  HX HEENT    
 lasik,  left optic nerve surgery  HX VASCULAR ACCESS    
 right dialysis access femoral  
 [place] ABDOMEN SURGERY PROC UNLISTED  11/01/2018  
 lower abdominal bowels removed  VASCULAR SURGERY PROCEDURE UNLIST    
 fistula r arm,   
 
Family History Problem Relation Age of Onset  Hypertension Mother  Diabetes Mother  Hypertension Father  Diabetes Father  Hypertension Sister Social History Tobacco Use  Smoking status: Former Smoker Years: 2.00  Smokeless tobacco: Never Used Substance Use Topics  Alcohol use: No  
  
 
Review of Systems:  
Constitutional: Negative for fever, chills, weight loss, + malaise/fatigue. HEENT: Negative for nosebleeds Respiratory: Negative for cough, hemoptysis Cardiovascular: Negative for chest pain, palpitations, orthopnea, claudication, leg swelling, syncope, and PND. Gastrointestinal: Negative for nausea, vomiting, diarrhea, blood in stool and melena. Genitourinary: Negative for dysuria, and hematuria. Musculoskeletal: Negative for myalgias, + arthralgia. Skin: Negative for rash. Heme: Does not bleed or bruise easily. Neurological: Negative for speech change and focal weakness Objective:  
 
Visit Vitals /60 (BP 1 Location: Left arm, BP Patient Position: Sitting, BP Cuff Size: Adult) Pulse 85 Resp 14 Ht 5' 9\" (1.753 m) Wt 200 lb (90.7 kg) SpO2 98% BMI 29.53 kg/m² Physical Exam:  
Constitutional: well-developed and well-nourished. No respiratory distress. Head: Normocephalic and atraumatic. Eyes: closed ENT: hearing normal 
Neck: supple. No JVD present. Cardiovascular: Normal rate, regular rhythm. Exam reveals no gallop and no friction rub. No murmur heard. Pulmonary/Chest: Effort normal and breath sounds normal. No wheezes. Abdominal: Soft, no tenderness. Musculoskeletal: no edema. Neurological: alert,oriented. Skin: Skin is warm and dry Psychiatric: normal mood and affect. Behavior is normal. Judgment and thought content normal.   
 
EKG: normal sinus rhythm, 1st degree AV block. Incomplete left bundle branch block Assessment/Plan: ICD-10-CM ICD-9-CM 1. Sinus bradycardia  R00.1 427.89   
2. Hypertension, unspecified type  I10 401.9 AMB POC EKG ROUTINE W/ 12 LEADS, INTER & REP 3. First degree AV block  I44.0 426.11   
4. End stage renal disease on dialysis (HCC)  N18.6 585.6 Z99.2 V45.11 The patient blood pressure tend to be low at dialysis center At times he was symptomatic with sinus bradycardia and does have persistent first-degree AV block Previous CA interval was longer than 400 ms on EKG in February 2021 The first degree AV block now on EKG in the office is less severe but is still present He is a candidate for leadless AV Micra pacemaker but the patient told me that he has had AV fistula made in the lower extremity area and the current one is on the left side so that would prevent the delivery catheter passage from the lower extremity right femoral vein for the leadless pacemaker He has had multiple accesses used for the dialysis in the upper extremity and anterior chest wall area For dual-chamber transvenous pacemaker, I need ultrasound evaluation of his subclavian vein I will arrange for this evaluation in the office and get back with him about the pacemaker implantation Thank you for involving me in this patient's care and please call with further concerns or questions. Radha Kowalski M.D. Electrophysiology/Cardiology [facility] and Vascular [address], [address] [place] [address]                            
[phone]

## 2021-05-10 NOTE — TELEPHONE ENCOUNTER
Spoke with [employer]. Scheduled patient for vascular US on 5/19/21 at 12:00 pm with an 11:45 am arrival time.      Future Appointments   Date Time Provider Silvia Montes   5/19/2021 12:00 PM VASCULAR, JAY ALSTON AMB

## 2021-05-10 NOTE — TELEPHONE ENCOUNTER
Patient needs vascular ultrasound for upper right and left subclavian vein to evaluate for possible PPM. Called [phone] to coordinate an appointment this week. Offered appointment this Wednesday, 5/12/21, but  Mary Esparza needs at least 7 days notice to coordinate transportation. Will coordinate with vascular tech for appointment next week.

## 2021-05-26 NOTE — TELEPHONE ENCOUNTER
----- Message from Adeline Muñoz MD sent at 5/25/2021  6:25 AM EDT -----  Based on this we can implant pacemaker right subclavian vein access if he wants to proceed

## 2021-05-26 NOTE — TELEPHONE ENCOUNTER
Called pt facility who answered and was put on hold and phone call was dropped. Will try again later    [facility] and she stated he needs to be called tomorrow as they are having problems with the phones at his facility.

## 2021-05-26 NOTE — LETTER
6/9/2021 9:20 AM 
 
Mr. Dickson Notice. 
[address] [facility] [address] [address] Your Dual Chamber Pacemaker procedure has been scheduled for 8/9/21 at 10:45 am, at [facility]. 
 
Please report to Admitting Department by 8:45 am, or 2 hours prior to your scheduled procedure. Please bring a list of your current medications and medication bottles, if able, to the hospital on this day. You will be unable to drive after your procedure so please make sure to bring someone with you to your procedure. You will need to have nothing to eat or drink after midnight, the night prior to your procedure. You may have small sips of water, if needed, to take with your medication. You will need labs drawn prior to your procedure. Please have this done no sooner than 7/9/21 and no later than 8/2/21. You should not stop your medication prior to your scheduled procedure. After your procedure, you will need to follow up with Dr. Iesha Eldridge nurse for a wound and device check. Your follow-up appointment has been scheduled for 8/20/21 at 11:20 am.  
 
Hibiclens 4% topical solution has been ordered and sent into your pharmacy Patient it start Hibiclens application 5 days prior to procedure date Directions Hibiclens 4%: Start cleanse 5 days prior to procedure 1. Rinse area (upper chest and upper arms) with water. 2. Apply minimum amount necessary to scrub the upper chest area from shoulder/neck to mid line of chest and to below the nipple each of  5 nights before the day of the procedure 3. Let solution dry.   
 
 
 
 
Sincerely, 
 
 
Ashlyn Jon MD

## 2021-06-04 NOTE — TELEPHONE ENCOUNTER
Patient's provider calling to find out the plan after having testing that was done on 5/19/21, please contact provider    [facility]  [phone]  Fax [phone]

## 2021-06-04 NOTE — TELEPHONE ENCOUNTER
Called Salazar Ayoub two patient identifiers confirmed Notified Salazar Ayoub about test results and Dr. Erick Almodovar recommendations. Salazar Ayoub is going to talk to the pt and call back on Monday. Salazar Ayoub verbalized understanding of information discussed w/ no further questions at this time.

## 2021-06-09 NOTE — TELEPHONE ENCOUNTER
Verified patient with two types of identifiers. Spoke with JONATHAN Torrez and notified of procedure date and time. Lore is going to verify once more with Mr. Amaris Fung that he would still like to proceed. Will fax pre procedure instructions to Lore at [phone]. Fax confirmation received. Lore verbalized understanding and will call with any other questions.

## 2021-06-09 NOTE — TELEPHONE ENCOUNTER
Sid Vargas is calling as she spoke with [facility] from the patients assisted living and was advised by her to call and speak with a nurse to get the patients results from his recent echo. Please advise.      Phone: [phone]

## 2021-06-09 NOTE — TELEPHONE ENCOUNTER
Stacey calling to get the result to get the results of his ultrasound (Duplex) done 5/19/21 in the office.  Please give her a call at [phone]    [facility]

## 2021-06-09 NOTE — TELEPHONE ENCOUNTER
Verified patient with two types of identifiers. Spoke with Yoselyn Card and notified of test results and MD recommendation. Scheduled patient for dual chamber ppm on 8/9/21 at 10:45 am. Will call Salazar Ayoub NP with [facility] to discuss results and PPM date and time. Stacey verbalized understanding and will call with any other questions. Attempted to reach Salazar Ayoub NP by telephone. A message was left with [place] for return call.

## 2021-08-09 NOTE — PROGRESS NOTES
Patient was scheduled for dual chamber PM implant today, but had not yet arrived. [employer] number as listed in chart as main contact ([phone]), confirmed ID of patient x 2, was told by staff member that transportation had arrived that morning, but that they were sent away as the appointment was \"supposed to have been canceled\". Attempted 2 contact numbers for Naye Castellanos NP for further information, but was unable to speak with her ([phone] which was a wrong number & [phone] which went straight to  without identifying information). Called Raúl overton (POA as listed in [facility]), confirmed ID x 2; she states she was advised that his procedure was canceled due to labs not completed & continued [address]. Per our charts, patient isn't on [address]. Advised Stacey that we wouldn't cancel for lack of labs, that labs could be completed on arrival.  She requested that we call Marcy Soria NP. Will try again later to speak to Marcy Soria. Update at 11AM:  Spoke with Marcy Soria NP via phone. She has been out of the office for the past week, is unaware of specifics regarding today. She will pass on our office number to [facility] (unit manager) so that the situation can be discussed & patient can be rescheduled.

## 2021-08-09 NOTE — TELEPHONE ENCOUNTER
The Unit Manager from [facility] and rehab calling to reschedule the patient's procedure scheduled fo 8/09/21, please advise    [facility]  [phone] ext.  [facility], [address] [place] [address]

## 2021-08-10 NOTE — TELEPHONE ENCOUNTER
Gio Andrews called from [facility] and would like Ara Osorio to give her a call back to schedule a missed pacemaker appointment for the patient. Please have her paged when retuning call.       Phone [phone]

## 2021-08-10 NOTE — TELEPHONE ENCOUNTER
Verified patient with two types of identifiers. Spoke with Lore and rescheduled patient's dual chamber PPM to 9/1/21 at 8:00 am. Notified that patient needs to arrive by 6:15 am. Lore asked regarding Eliquis and Plavix. Notified we do not have either on his med list. Will call JONATHAN Torrez for clarification. Verified patient with two types of identifiers. Spoke with JONATHAN Torrez who states patient has been on Eliquis 5 mg BID since March due to a incidental PE found on a CT scan by Dr. Marv Zavala. Patient has been on Plavix 75 mg daily since November 2020. Dr. Marv Zavala did not want to stop Plavix when Eliquis was started. Will notify MD and verify if medication needs to be held prior to upcoming procedure.

## 2021-08-13 NOTE — TELEPHONE ENCOUNTER
Faxed instructions to hold [employer] to [facility] attn: Chris Curtis at fax number [phone]. Fax confirmation received.

## 2021-08-24 NOTE — TELEPHONE ENCOUNTER
Attempted to reach [facility] with Stevie Yancey, by telephone. A message was left for return call.

## 2021-08-25 NOTE — TELEPHONE ENCOUNTER
Verified patient with two types of identifiers. Spoke with [facility] and notified that [address] [place] [address] is now requiring all patiens be tested for COVID prior to procedure. [facility] verified they do testing at his facility. Patient will be tested on Friday and bring negative results to hospital the day of his procedure. [facility] verbalized understanding and will call with any other questions.

## 2021-09-01 PROBLEM — Z95.818 STATUS POST PLACEMENT OF IMPLANTABLE LOOP RECORDER: Status: ACTIVE | Noted: 2021-01-01

## 2021-09-01 NOTE — H&P
Cardiac Electrophysiology H&P Note     Subjective:      Lewis Anaya. is a 66 y.o. patient who presents today for planned dual chamber pacemaker implant. Notes fatigue with slow HR, also with hypotension at dialysis. HR reportedly intermittently drops to 30s, but dialysis has never been stopped early due to this. 1st degree AVB. Holter in 02/2021 showed PVCs, NSVT. In 02/2021, he had normal LVEF at [facility]. Dialysis access has been in all 4 extremities as well as chest area, currently uses left leg femoral arteriovenous graft (done by Dr. Latonia Hooper in 2020). Vascular ultrasound in 05/2021 indicated that right subclavian vein may be used for pacemaker access.       Problem List  Date Reviewed: 5/6/2021        Codes Class Noted    Hypertension ICD-10-CM: I10  ICD-9-CM: 401.9  4/15/2011        Renal failure ICD-10-CM: N19  ICD-9-CM: 715  4/15/2011        Diabetes mellitus ([facility] [address] ICD-10-CM: E11.9  ICD-9-CM: 250.00  4/15/2011                Allergies   Allergen Reactions    Adhesive Tape-Silicones Itching     Itchiness      Past Medical History:   Diagnosis Date    Adverse effect of anesthesia     \"at [facility] I had a little problem with my lungs, like they tried to colapse, but they got me squared away\"    Arthritis     CAD (coronary artery disease)     Chronic kidney disease     ESRD, [facility] [place]/T,Thurs,Sat    Diabetes ([address]     type 2    Heart failure ([address] 2009    Hyperlipidemia     Hypertension     Legal blindness     Orthostatic hypotension     SOB (shortness of breath)     O2 AT 2LPM PRN    Thyroid disease     hyperparathyroidism    Vitamin D deficiency      Past Surgical History:   Procedure Laterality Date    HX APPENDECTOMY      HX HEENT      lasik,  left optic nerve surgery    HX VASCULAR ACCESS      right dialysis access femoral    [place] ABDOMEN SURGERY PROC UNLISTED  11/01/2018    lower abdominal bowels removed    VASCULAR SURGERY PROCEDURE UNLIST fistula r arm,      Family History   Problem Relation Age of Onset    Hypertension Mother     Diabetes Mother     Hypertension Father     Diabetes Father     Hypertension Sister      Social History     Tobacco Use    Smoking status: Former Smoker     Years: 2.00    Smokeless tobacco: Never Used   Substance Use Topics    Alcohol use: No        Review of Systems: Review of all other systems otherwise negative. Constitutional: Negative for fever, chills, weight loss, + malaise/fatigue. HEENT: Negative for nosebleeds   Respiratory: Negative for cough, hemoptysis  Cardiovascular: Negative for chest pain, palpitations, orthopnea, claudication, leg swelling, syncope, and PND. Gastrointestinal: Negative for nausea, vomiting, diarrhea, blood in stool and melena. Genitourinary: Negative for dysuria, and hematuria. Musculoskeletal: Negative for myalgias, + arthralgia. Skin: Negative for rash. Heme: Does not bleed or bruise easily. Neurological: Negative for speech change and focal weakness       Objective:     Visit Vitals  /69 (BP 1 Location: Right upper arm, BP Patient Position: At rest)   Pulse 81   Temp 98.5 °F (36.9 °C)   Resp 18   Ht 5' 9\" (1.753 m)   Wt 255 lb (115.7 kg)   SpO2 96%   BMI 37.66 kg/m²          Physical Exam:   Constitutional: well-developed and well-nourished. No respiratory distress. Head: Normocephalic and atraumatic. Eyes: closed  ENT: hearing normal  Neck: supple. No JVD present. Cardiovascular: Normal rate, irregular rhythm. Exam reveals no gallop and no friction rub. No murmur heard. Pulmonary/Chest: Effort normal and breath sounds normal. No wheezes. Abdominal: Soft, no tenderness. Musculoskeletal: no edema. Neurological: alert,oriented. Skin: Skin is warm and dry  Psychiatric: normal mood and affect.  Behavior is normal. Judgment and thought content normal.         Assessment/Plan:     Mr. Jorgito King has reported bradycardia down to 30s with associated hypotension at dialysis, but not interfering with dialysis completion. Unable to get IV access today by nursing staff. Recommend ILR to correlate symptoms with any severe sinus bradycardia that may warrant vascular surgery for access to implant dual chamber pacemaker in the future. Risks/benefits reviewed with patient & Franca Colin; they agree to proceed. [address] [facility] [place]  09/01/21    Addendum from EP attending: This patient was seen and examined by me in a face-to-face visit today. I reviewed the medical record including lab results, imaging and other provider notes. I confirmed the history as described above. I spoke to the patient, obtained history and examined the patient. I discussed assessments and plans in details with nurse practitioner. Below are my treatment plans and recommendations. He is feeling fine  Vital signs are stable  Exam shows   Visit Vitals  /69 (BP 1 Location: Right upper arm, BP Patient Position: At rest)   Pulse 81   Temp 98.5 °F (36.9 °C)   Resp 18   Ht 5' 9\" (1.753 m)   Wt 255 lb (115.7 kg)   SpO2 96%   BMI 37.66 kg/m²       Nursing note and vitals reviewed. Constitutional: well-developed and well-nourished. No distress. Head: Normocephalic and atraumatic. Eyes: closed  Neck: Neck supple. No JVD present. Cardiovascular: Normal rate, irregular rhythm and normal heart sounds. Exam reveals no gallop and no friction rub. No murmur heard. Pulmonary/Chest: Effort normal and breath sounds normal. No wheezes. Abdominal: Soft, no rebound. Musculoskeletal: no edema and no tenderness. Neurological: alert,oriented. Skin: Skin is warm and dry, not diaphoretic. Psychiatric: normal mood and affect.  Behavior is normal. Judgment and thought content normal.      Assessment and Plan:  Patient does not have IV access for sedation   Nurse cannot get peripheral IV after many attempts  Sinus rate is normal 80 bpm and PVCs noted today  He never had problem with completion of dialysis per him and poa despite report of drop in sinus rate to 30 bpm at dialysis  He had av fistula done in all 4 extremities in the past  I recommended ILR to correlate better any symptoms with sinus bradycardia 40 bpm before a transvenous pacer can be implanted  He may need vascular surgery assistance for access if dual chamber pacer is required  Thank you for involving me in this patient's care and please call with further concerns or questions. Barb Osgood, M.D.   Electrophysiology/Cardiology  [facility] and Vascular [place]  [address] [facility], [facility] [address]  [address]  [phone]

## 2021-09-01 NOTE — DISCHARGE INSTRUCTIONS
Discharge Instructions Post Implantable Loop Recorder      1. You may shower starting tomorrow. 2.  You may remove the dressing at your site tomorrow. Do not peel or scrub the underlying skin glue. It will break down on its own over time. 3.  A prescription for antibiotics was sent electronically to your pharmacy on record. Please pick it up & take as directed. 4.  Follow up in Dr. Karyn Yepez office as noted below for wound & device check. If you are unable to make it in to clinic, please call so that we can arrange a remote check of your device. Future Appointments   Date Time Provider Silvia Montes   9/10/2021 10:40 AM PACEMAKER3, JAY ALSTON AMB   9/10/2021 11:00 AM WOUND CHECKS, Patricia Green M.D.   Electrophysiology/Cardiology  [facility] and Vascular [place]  [address] [place] [address], [address] [place] [address]                               [phone]

## 2021-09-01 NOTE — PROGRESS NOTES
0831  TRANSFER - IN REPORT:    Verbal report received from Jose on Laci Dumont.  being received from [facility] procedural area for routine progression of care. Report consisted of patients Situation, Background, Assessment and Recommendations(SBAR). Information from the following report(s) SBAR, Procedure Summary and Recent Results was reviewed with the receiving clinician. Opportunity for questions and clarification was provided. Assessment completed upon patients arrival to [address] [place] [address] and care assumed. Cardiac Cath Lab Recovery Arrival Note:    Laci Dumont. arrived to [facility]. Patient procedure= Loop recorder. Patient on cardiac monitor, non-invasive blood pressure, SPO2 monitor. On room air. NO  IV , MD aware . Patient status doing well without problems. Patient is A&Ox 4. Patient reports No complaints. PROCEDURE SITE CHECK:    Procedure site:without any bleeding and no hematoma, NO pain/discomfort reported at procedure site. No change in patient status. Continue to monitor patient and status. 200  RN helping pt get dressed    3634  RN Reviewing discharge paperwork with patient. Pt has an opportunity for questions. 3005  Pt discharged to caregiver in personal wheelchair.

## 2021-09-01 NOTE — ROUTINE PROCESS
[id number]    Cardiac Cath Lab Recovery Arrival Note:      Caitlin Weiss. arrived to Cardiac Cath Lab, Recovery Area. Staff introduced to patient. Patient identifiers verified with NAME and DATE OF BIRTH. Procedure verified with patient. Consent forms reviewed and signed by patient or authorized representative and verified. Allergies verified. Patient and family oriented to department. Patient and family informed of procedure and plan of care. Questions answered with review. Patient prepped for procedure, per orders from physician, prior to arrival.    Patient on cardiac monitor, non-invasive blood pressure, SPO2 monitor. On room air. Patient is A&Ox 4. Patient reports no complaints. Patient in stretcher, in low position, with side rails up, call bell within reach, patient instructed to call if assistance as needed. Patient prep in: [address], [place] [address]. Patient care giver has provided phone number     Prep by: Raphael Ortiz and [facility], [facility]  Pt being prepped for procedure: loop insertion. 0831  Pt bedside procedure complete.

## 2021-09-01 NOTE — PROCEDURES
Cardiac Procedure Note   Patient: Deangelo Briceno MRN: [id number]  SSN: [id number]   YOB: 1950 Age: 66 y.o.   Sex: male    Date of Procedure: 9/1/2021   Pre-procedure Diagnosis: sinus bradycardia and hypotension at dialysis  Post-procedure Diagnosis: same  Procedure: implantable loop recorder  :  Dr. Inge Tavares MD    Assistant(s):  None  Anesthesia: local lidocaine   Nurse cannot get IV in left arm  Estimated Blood Loss: Less than 10 mL   Specimens Removed: None  Findings: left chest ILR    Complications: None   Implants:  [employer] ILR    Signed by:  Inge Tavares MD  9/1/2021  8:33 AM

## 2021-09-10 NOTE — PROGRESS NOTES
Cardiac Electrophysiology Wound Check Note      Wound Check   Patient is here for wound check. No fever, drainage   Physical Exam   Constitutional: well-developed and well-nourished. Skin: Left side pocket is healing without redness, drainage, hematoma. The wound is intact with skin glue. ASSESSMENT and PLAN   The incision is healing without redness, drainage, hematoma. Intact with skin glue. The patient has finished their anti-biotic and been compliant with arm restrictions.    Current treatment plan is effective, no change in therapy   Device check shows proper functions    Follow-up Disposition:  Return yearly, will check via device clinic or remote monitoring in the future

## 2021-11-14 PROBLEM — I96 GANGRENE OF LEFT FOOT (HCC): Status: ACTIVE | Noted: 2021-01-01

## 2021-11-14 NOTE — PROGRESS NOTES
Nephrology Progress Note  [address]     [email or web address]  Phone - [phone]   Patient: Nadine Cuello. YOB: 1950        Date- 11/14/2021   Admit Date: 11/13/2021  CC: Follow up for ESRD         IMPRESSION & PLAN:    ESRD goes to [facility] on TTS schedule under Dr. Segun Louis Left lower extremity cellulitis and left toe osteomyelitis   Left calf cellulitis   Gangrene of left lower extremity fourth digit   CAD   Type 2 diabetes    PLAN-   Patient had a full dialysis treatment done on Saturday.  No acute need for hemodialysis today from volume laboratory standpoint.  We will plan for dialyzing him on Tuesday.  [facility] team has been notified.  Thank you for the consult will follow with you     Subjective: Interval History:   -78-year-old male past medical history of ESRD goes to [facility] 8 on TTS schedule presented to the ED with complaints of cellulitis and exposed wound on the left calf. He has been he has been having ongoing gangrene of the left third and fourth digit. Presented for further evaluation for infection. Had a full dialysis treatment on Saturday. He has a left lower leg AV graft in the left thigh area. Renal consult requested for management of hemodialysis. Objective:   Vitals:    11/14/21 1127 11/14/21 1205 11/14/21 1412 11/14/21 1452   BP: 139/66   101/65   Pulse: (!) 105 (!) 101 94 63   Resp: 18   16   Temp: 98 °F (36.7 °C)   97.8 °F (36.6 °C)   SpO2: 99%   93%      11/13 0701 - 11/14 0700  In: 50 [I.V.:50]  Out: -   There were no vitals filed for this visit. Physical exam:   GEN: NAD,Blind  NECK- Supple, no mass  RESP: No wheezing, Clear b/l  CVS: S1,S2  RRR  NEURO: Normal speech, Non focal  EXT: Left foot third and fourth digit gangrenous, left calf induration and and exposed wound  PSYCH: Normal Mood    Chart reviewed. Pertinent Notes reviewed.      Data Review :  Recent Labs     11/13/21 2137   *   K 4.0 CL 97   CO2 34*   BUN 26*   CREA 3.99*   *   CA 8.2*     Recent Labs     11/13/21  2137   WBC 20.1*   HGB 10.4*   HCT 34.0*        No results for input(s): FE, TIBC, PSAT, FERR in the last 72 hours.    Medication list  reviewed  Current Facility-Administered Medications   Medication Dose Route Frequency    Vancomycin Pharmacy Dosing   Other Rx Dosing/Monitoring    atorvastatin (LIPITOR) tablet 20 mg  20 mg Oral QHS    fludrocortisone (FLORINEF) tablet 0.1 mg  0.1 mg Oral DAILY    midodrine (PROAMATINE) tablet 10 mg  10 mg Oral TIDPC    oxyCODONE IR (ROXICODONE) tablet 5 mg  5 mg Oral Q6H PRN    pantoprazole (PROTONIX) tablet 20 mg  20 mg Oral DAILY    pregabalin (LYRICA) capsule 25 mg  25 mg Oral DAILY    tiZANidine (ZANAFLEX) tablet 2 mg  2 mg Oral BID    piperacillin-tazobactam (ZOSYN) 3.375 g in 0.9% sodium chloride (MBP/ADV) 100 mL MBP  3.375 g IntraVENous Q12H          Griselda Turner MD              [facility]  [facility] [place] [address], [facility], [address]  Phone - [phone]               Fax - [phone]

## 2021-11-14 NOTE — ED PROVIDER NOTES
61-year-old male with history of coronary disease, end-stage renal disease on dialysis, type 2 diabetes, heart failure, hypertension, blindness, thyroid disease presents to the emergency department by EMS with concern for his left foot. He has had left foot pain for several weeks along with some necrotic appearing left fourth toe. He recently had a left dialysis graft placed. The history is provided by the patient and medical records. Toe Pain   This is a new problem. The current episode started more than 1 week ago. The problem occurs constantly. The problem has been gradually worsening. The pain is present in the left foot and left toes. The quality of the pain is described as dull and aching. The pain is severe. Associated symptoms include limited range of motion. Pertinent negatives include no numbness and no neck pain. There has been no history of extremity trauma.         Past Medical History:   Diagnosis Date    Adverse effect of anesthesia     [address]'s I had a little problem with my lungs, like they tried to colapse, but they got me squared away\"    Arthritis     CAD (coronary artery disease)     Chronic kidney disease     [facility], [facility] [place]/T,Thurs,Sat    Diabetes ([address]     type 2    Heart failure ([address] 2009    Hyperlipidemia     Hypertension     Legal blindness     Orthostatic hypotension     SOB (shortness of breath)     O2 AT 2LPM PRN    Thyroid disease     hyperparathyroidism    Vitamin D deficiency        Past Surgical History:   Procedure Laterality Date    HX APPENDECTOMY      HX HEENT      lasik,  left optic nerve surgery    HX VASCULAR ACCESS      right dialysis access femoral    IN ABDOMEN SURGERY PROC UNLISTED  11/01/2018    lower abdominal bowels removed    VASCULAR SURGERY PROCEDURE UNLIST      fistula r arm,          Family History:   Problem Relation Age of Onset    Hypertension Mother     Diabetes Mother     Hypertension Father     Diabetes Father     Hypertension Sister        Social History     Socioeconomic History    Marital status: SINGLE     Spouse name: Not on file    Number of children: Not on file    Years of education: Not on file    Highest education level: Not on file   Occupational History    Not on file   Tobacco Use    Smoking status: Former Smoker     Years: 2.00    Smokeless tobacco: Never Used   Substance and Sexual Activity    Alcohol use: No    Drug use: No     Types: Prescription, OTC    Sexual activity: Not on file   Other Topics Concern    Not on file   Social History Narrative    Not on file     Social Determinants of Health     Financial Resource Strain:     Difficulty of Paying Living Expenses: Not on file   Food Insecurity:     Worried About [address] in the Last Year: Not on file    [address] [place] in the Last Year: Not on file   Transportation Needs:     Lack of Transportation (Medical): Not on file    Lack of Transportation (Non-Medical):  Not on file   Physical Activity:     Days of Exercise per Week: Not on file    Minutes of Exercise per Session: Not on file   Stress:     Feeling of Stress : Not on file   Social Connections:     Frequency of Communication with Friends and Family: Not on file    Frequency of Social Gatherings with Friends and Family: Not on file    Attends Anabaptism Services: Not on file    Active Member of [address] [place] [address] [employer] or Organizations: Not on file    Attends Club or Organization Meetings: Not on file    Marital Status: Not on file   Intimate Partner Violence:     Fear of Current or Ex-Partner: Not on file    Emotionally Abused: Not on file    Physically Abused: Not on file    Sexually Abused: Not on file   Housing Stability:     Unable to Pay for Housing in the Last Year: Not on file    Number of [place] in the Last Year: Not on file    Unstable Housing in the Last Year: Not on file         ALLERGIES: Adhesive tape-silicones    Review of Systems Constitutional: Negative for fatigue and fever. HENT: Negative for sneezing and sore throat. Respiratory: Negative for cough and shortness of breath. Cardiovascular: Negative for chest pain and leg swelling. Gastrointestinal: Negative for abdominal pain, diarrhea, nausea and vomiting. Genitourinary: Negative for difficulty urinating and dysuria. Musculoskeletal: Negative for arthralgias, myalgias and neck pain. Skin: Negative for color change and rash. Neurological: Negative for weakness, numbness and headaches. Psychiatric/Behavioral: Negative for agitation and behavioral problems. There were no vitals filed for this visit. Physical Exam  Vitals and nursing note reviewed. Constitutional:       General: He is not in acute distress. Appearance: He is well-developed. He is obese. He is ill-appearing. He is not toxic-appearing or diaphoretic. HENT:      Head: Normocephalic and atraumatic. Nose: Nose normal.      Mouth/Throat:      Mouth: Mucous membranes are moist.      Pharynx: Oropharynx is clear. Eyes:      Extraocular Movements: Extraocular movements intact. Conjunctiva/sclera: Conjunctivae normal.      Pupils: Pupils are equal, round, and reactive to light. Cardiovascular:      Rate and Rhythm: Normal rate and regular rhythm. Heart sounds: No murmur heard. Comments: Diminished pulses in the left foot, DP pulse is dopplerable  Pulmonary:      Effort: Pulmonary effort is normal. No respiratory distress. Breath sounds: Normal breath sounds. No wheezing. Chest:      Chest wall: No mass or tenderness. Abdominal:      General: There is no distension. Palpations: Abdomen is soft. Tenderness: There is no abdominal tenderness. There is no guarding or rebound. Musculoskeletal:         General: Swelling and tenderness present. No deformity. Cervical back: Normal range of motion and neck supple. No rigidity. No muscular tenderness. Right lower leg: No tenderness. No edema. Left lower leg: No tenderness. No edema. Comments: Generalized swelling left foot. The third toe appears hypoperfused with poor cap refill. The fourth toe is necrotic with weeping   Skin:     General: Skin is warm and dry. Capillary Refill: Capillary refill takes less than 2 seconds. Neurological:      General: No focal deficit present. Mental Status: He is alert and oriented to person, place, and time. Psychiatric:         Mood and Affect: Mood normal.         Behavior: Behavior normal.          [facility]  ED Course as of 11/13/21 2150   Sat Nov 13, 2021 2134 Patient is a difficult IV stick. I attempted IV ultrasound and was able to get blood but unable to thread the catheter. Will attempt later. [JM]      ED Course User Index  [JM] Thor Mart MD       Procedures             9:50 PM  Change of shift. Care of patient signed over to Dr. Shaye Castano. Handoff complete.

## 2021-11-14 NOTE — CONSULTS
[facility]  [address] [facility], [facility] [address] [place] [address]      Foot and ankle surgery consult       Assessment/Plan:  Ulcer left leg   Ulcer with necrotic bone left 4th digit  Osteomyelitis left foot  Cellulitis left foot  CKD  PVD    Pt seen at bedside with POA present   the patient is a DM uncontrolled vasculopath, with stable infection to the left foot. He has a nonhealing wound to the left calf as well     We discussed clinical findings with the patient including exposed bone with nonhealing ulceration and osteomyelitis to the left 4th toe. We also discussed radiographic findings with the patient showing osteomyelitis and CT evidence of potential gas within the soft tissues.      The patient has an expensive PMH, is blind and nonambulatory at this time. He does not walk at all      We discussed multiple different treatment options with the patient including living with the condition, long term IV abx therapy and chronic woundcare, debridement of soft tissue and  chronic wound care and possible long term IV abx therapy, debridement of soft tissue, multiple toe amputations, open bone biopsies, staged with possible delayed primary closure VS BKA or AKA. We discussed the risks/benefits/complications/ of each of the procedures particularly noted with poor vascular envelope, the required recovery expectations and potential long term sequela. Both myself and Dr Esa Cook of vascular team discussed options with patient and POA and feel it is a safer, and better option to complete an Above knee amputation. The foot does not appear salvageable at this time.   After long discussion with the patient and POA, they agree.   Maury Dobson  Labs/imaging reviewed  abx per hospital team- thank you       elevate and offload, float heels off edge of bed with foam block or pillows, air boots should be on at all times, pt is non-ambulatory       Foot and ankle to follow if needed, please contact with questions      Thank you for this consultation. HISTORY OF THE PRESENT ILLNESS (HPI)  Simone Rios is a 66 y.o. male with hx of orthostatic hypotension, ESRD on HD, Blindness, cad admitted for infection in the left foot. Foot and ankle surgery team consulted for further evaluation and management. Pt states he has had pain in his left foot for the last month. Unable to describe any additional changes due to his poor eye-sight. States he has otherwise been doing and feeling well. The patient denies any fever, chills, chest or abdominal pain, nausea, vomiting, cough, congestion, recent illness, palpitations, or dysuria.     REVIEW OF SYSTEMS  10 point review of systems completed all those negative except those entered in HPI    History:   Allergies   Allergen Reactions    Adhesive Tape-Silicones Itching     Itchiness      Family History   Problem Relation Age of Onset    Hypertension Mother     Diabetes Mother     Hypertension Father     Diabetes Father     Hypertension Sister       Past Surgical History:   Procedure Laterality Date    HX APPENDECTOMY      HX HEENT      lasik,  left optic nerve surgery    HX VASCULAR ACCESS      right dialysis access femoral    NH ABDOMEN SURGERY PROC UNLISTED  11/01/2018    lower abdominal bowels removed    VASCULAR SURGERY PROCEDURE UNLIST      fistula r arm,      Social History     Tobacco Use    Smoking status: Former Smoker     Years: 2.00    Smokeless tobacco: Never Used   Substance Use Topics    Alcohol use: No       Social History     Substance and Sexual Activity   Alcohol Use No     Social History     Substance and Sexual Activity   Drug Use No    Types: Prescription, OTC     Social History     Tobacco Use   Smoking Status Former Smoker    Years: 2.00   Smokeless Tobacco Never Used     Current Facility-Administered Medications   Medication Dose Route Frequency    Vancomycin Pharmacy Dosing   Other Rx Dosing/Monitoring    atorvastatin (LIPITOR) tablet 20 mg  20 mg Oral QHS    fludrocortisone (FLORINEF) tablet 0.1 mg  0.1 mg Oral DAILY    midodrine (PROAMATINE) tablet 10 mg  10 mg Oral TIDPC    oxyCODONE IR (ROXICODONE) tablet 5 mg  5 mg Oral Q6H PRN    pantoprazole (PROTONIX) tablet 20 mg  20 mg Oral DAILY    pregabalin (LYRICA) capsule 25 mg  25 mg Oral DAILY    tiZANidine (ZANAFLEX) tablet 2 mg  2 mg Oral BID    piperacillin-tazobactam (ZOSYN) 3.375 g in 0.9% sodium chloride (MBP/ADV) 100 mL MBP  3.375 g IntraVENous Q12H        Objective:  Vitals:   Patient Vitals for the past 12 hrs:   BP Temp Pulse Resp SpO2   11/14/21 1205   (!) 101     11/14/21 1127 139/66 98 °F (36.7 °C) (!) 105 18 99 %   11/14/21 0959   (!) 105     11/14/21 0820 (!) 148/70 97.7 °F (36.5 °C) (!) 102 16 95 %   11/14/21 0300 121/82 98.8 °F (37.1 °C) (!) 101 18 94 %   11/14/21 0221 121/77    98 %       Dermatological:  Noted necrotic 4th digit left fot with cellulitis and swelling to the forfoot, exposed bone,     2ndary nonhealing ulcer to the left calf , stable. Vascular:   DP/PT absent left foot,  DP/PT pulses decreased +1/4 right lower extremity. Pedal hair growth absent    Neurological:   Epicritic and protective threshold diminished. Patient is unable to detect  5.07 [place] monofilament in  5/5 random tested spots B/LLE    MSK  Pt is bedridden, does not walk and does not stand up     Constitutional: Pt is an elderly, ill  male,    Imaging:   CT left foot  IMPRESSION  1. Study limited by motion. 2.  Possible subtle patchy osteolysis at the plantar aspect of the fourth  metatarsal head (series 202, image 105), acute osteomyelitis is difficult to  exclude.    3.  Diffuse soft tissue swelling with scattered soft tissue gas in the forefoot,  correlate for cellulitis and/or necrotizing fasciitis     Labs:  Recent Labs     11/13/21  2137   HGB 10.4*   HCT 34.0*   INR 1.6*   *   K 4.0   CL 97   CO2 34*   BUN 26*   CREA 3.99*   *       Dino Keturah Preston DPM  11/14/2021

## 2021-11-14 NOTE — PROGRESS NOTES
Primary Nurse Simona Pineda, PIPER and Jamel Parham RN performed a dual skin assessment on this patient Impairment noted- see wound doc flow sheet  Faizan score is 12. Left 4th toe necrotic. Draining wound on L calf.

## 2021-11-14 NOTE — CONSULTS
Vascular Surgery Consult    Subjective:      Madina Maria is a 66 y.o. male who presents for evaluation of left foot pain. He is blind and lives in a LTAC. He reports that he has had a toe wound for about 1.5 months. He has HD through a left thigh graft. He feels fatigues. He has a chronic left calf wound as well.      Past Medical History:   Diagnosis Date    Adverse effect of anesthesia     [address]'s I had a little problem with my lungs, like they tried to colapse, but they got me squared away\"    Arthritis     CAD (coronary artery disease)     Chronic kidney disease     [facility], [facility] [place]T,Thurs,Sat    Diabetes ([address]     type 2    Heart failure ([address] 2009    Hyperlipidemia     Hypertension     Legal blindness     Orthostatic hypotension     SOB (shortness of breath)     O2 AT 2LPM PRN    Thyroid disease     hyperparathyroidism    Vitamin D deficiency      Past Surgical History:   Procedure Laterality Date    HX APPENDECTOMY      HX HEENT      lasik,  left optic nerve surgery    HX VASCULAR ACCESS      right dialysis access femoral    [place] ABDOMEN SURGERY PROC UNLISTED  11/01/2018    lower abdominal bowels removed    VASCULAR SURGERY PROCEDURE UNLIST      fistula r arm,       Family History   Problem Relation Age of Onset    Hypertension Mother     Diabetes Mother     Hypertension Father     Diabetes Father     Hypertension Sister      Social History     Socioeconomic History    Marital status: SINGLE   Tobacco Use    Smoking status: Former Smoker     Years: 2.00    Smokeless tobacco: Never Used   Substance and Sexual Activity    Alcohol use: No    Drug use: No     Types: Prescription, OTC      Current Facility-Administered Medications   Medication Dose Route Frequency Provider Last Rate Last Admin    Vancomycin Pharmacy Dosing   Other Rx Dosing/Monitoring Charles Gutierrez MD        atorvastatin (LIPITOR) tablet 20 mg  20 mg Oral QHS Charles Gutierrez MD 20 mg at 21 0321    fludrocortisone (FLORINEF) tablet 0.1 mg  0.1 mg Oral DAILY Charles Gutierrez MD   0.1 mg at 21 1008    midodrine (PROAMATINE) tablet 10 mg  10 mg Oral TIDPC Charles Gutierrez MD   10 mg at 21 1008    oxyCODONE IR (ROXICODONE) tablet 5 mg  5 mg Oral Q6H PRN Charles Gutierrez MD   5 mg at 21 1007    pantoprazole (PROTONIX) tablet 20 mg  20 mg Oral DAILY Charles Gutierrez MD   20 mg at 21 1008    pregabalin (LYRICA) capsule 25 mg  25 mg Oral DAILY Charles Gutierrez MD   25 mg at 21 1008    tiZANidine (ZANAFLEX) tablet 2 mg  2 mg Oral BID Charles Gutierrez MD   2 mg at 21 1008    piperacillin-tazobactam (ZOSYN) 3.375 g in 0.9% sodium chloride (MBP/ADV) 100 mL MBP  3.375 g IntraVENous Q12H Charles Gutierrez MD            Allergies   Allergen Reactions    Adhesive Tape-Silicones Itching     Itchiness        Review of Systems:  A comprehensive review of systems was negative except for that written in the History of Present Illness. Objective:        Patient Vitals for the past 8 hrs:   BP Temp Pulse Resp SpO2   21 1127 139/66 98 °F (36.7 °C) (!) 105 18 99 %   21 0959   (!) 105     21 0820 (!) 148/70 97.7 °F (36.5 °C) (!) 102 16 95 %       Temp (24hrs), Av.6 °F (37 °C), Min:97.7 °F (36.5 °C), Max:99.5 °F (37.5 °C)      Physical Exam:  GENERAL: alert, cooperative, no distress, appears older than stated age, LUNG: clear to auscultation bilaterally, HEART: regular rate and rhythm, S1, S2 normal, no murmur, click, rub or gallop, ABDOMEN: soft, non-tender. Bowel sounds normal. No masses,  no organomegaly, EXTREMITIES: Left 4th toe with gangrene there is a fluid cavity on the dorsal surface of the foot. No palpable pulses.  There is a chronic ulcer on the left calf, SKIN: Normal., NEUROLOGIC: negative    Assessment:     67 y/o AAM with chronic LLE ischemia and a DM foot infection    Plan:     He presents with worsened left foot pain. He has wet gangrene of his left foot, starting likely from his 4th necrotic toe. He has chronic peripheral vascular disease and has a left thigh graft, further contributing to his ischemia. He is also non-ambulatory. This will be challenging to salvage based on his bad infection AND his long-term HD access in the left leg. High likelihood for major limb loss. Addendum: Seen with Dr. Carola Sargent. Given his nonambulatory status and everything above. Will plan a left AKA, Patient and wife in agreement. Will work on setting up tomorrow.

## 2021-11-14 NOTE — PROGRESS NOTES
Problem: Pressure Injury - Risk of  Goal: *Prevention of pressure injury  Description: Document Faizan Scale and appropriate interventions in the flowsheet.   Outcome: Progressing Towards Goal  Note: Pressure Injury Interventions:  Sensory Interventions: Pressure redistribution bed/mattress (bed type)    Moisture Interventions: Absorbent underpads    Activity Interventions: Pressure redistribution bed/mattress(bed type)    Mobility Interventions: Assess need for specialty bed    Nutrition Interventions: Document food/fluid/supplement intake, Discuss nutritional consult with provider, Offer support with meals,snacks and hydration    Friction and Shear Interventions: Apply protective barrier, creams and emollients, Minimize layers

## 2021-11-14 NOTE — PROGRESS NOTES
Day #1 of Zosyn  Indication:  LLE cellulitis and gangrene  Current regimen:  Rx to dose  Abx regimen: Vancomycin and Zosyn  Recent Labs     21  2137   WBC 20.1*   CREA 3.99*   BUN 26*     Est CrCl: dialysis  Temp (24hrs), Av.2 °F (37.3 °C), Min:98.8 °F (37.1 °C), Max:99.5 °F (37.5 °C)    Cultures: blood and wound    Plan: Change to 3.375 grams q12h

## 2021-11-14 NOTE — ED NOTES
Perfect Serve Consult for Admission  12:14 AM    ED Room Number: ER08/08  Patient Name and age:  Arnie Lee. 66 y.o.  male  Working Diagnosis:   1. Wet gangrene ([address]        COVID-19 Suspicion:  no  Sepsis present:  yes  Reassessment needed: no  Code Status:  Full Code  Readmission: no  Isolation Requirements:  no  Recommended Level of Care:  telemetry  Department:[facility] Adult ED - 21     10PM  Change of shift. Care of patient taken over from Dr. Candi Pichardo; H&P reviewed, bedside handoff complete. Awaiting CT and labs. Patient presents with wound to the left foot consistent with wet gangrene. Doppler pulses were obtained. CTA was not possible given limited IV access. CT of the lower extremity without contrast was limited by motion artifact. Patient does have a significantly elevated white blood cell count and is borderline febrile. He will be admitted for IV antibiotics and surgical consultation.

## 2021-11-14 NOTE — PROGRESS NOTES
[address] [facility] Adult  Hospitalist Group                                                                                          Hospitalist Progress Note  Alicia Pandya NP  Answering service: [phone] OR [phone] from in house phone        Date of Service:  2021  NAME:  Callie Elizalde. :  1950  MRN:  [id number]      Admission Summary:   Callie Salvador is a 66 y.o. male with hx of orthostatic hypotension, ESRD on HD, Blindness, cad who presents to hospital from LTCF for evaluation of left foot infection. Pt states he has had pain in his left foot for the last month. Unable to describe any additional changes due to his poor eye-sight. States he has otherwise been doing and feeling well. The patient denies any fever, chills, chest or abdominal pain, nausea, vomiting, cough, congestion, recent illness, palpitations, or dysuria. Remarkable vitals on ER Presentations: unremarkable  Labs Remarkable for: wbc 20.1, hgb 10.4, crp 18.90, esr 41,   ER Images: CT LLE: Suboptimal examination secondary to motion artifact. No definite bony  destruction is evident. Diffuse osteopenia. Diffuse soft tissue swelling in the  left foot. Interval history / Subjective:    Seen and examined patient sitting up in bed. States that he is doing ok but having a lot of pain in his feet and legs. States he is very thirsty. Wound on left foot and left calf (see pictures in chart)- foul smelling. Patient states very painful. RN informed to provide pain medication.       Assessment & Plan:     Cellulitis of Left Foot w/ Gagrene of LLE 3rd et 4th Digit  -Continue with vanc+zosyn  -Follow blood and wound cultures  -Vascular surgery consult  -Keep NPO  -Hold eliquis, asa and plavix     ESRD on HD  -Nephro consult for HD     CAD  -Hold eliquis, asa and plavix     NIDDM II w/ Diabetic Neuropathy  -SSI + Hypoglycemic protocols  -Accuchecks q6 while npo     Orthostatic Hypotension  -Home midrodine and fludrocortisone      Code status: Full   DVT prophylaxis:     Care Plan discussed with: Patient/Family and Nurse  Anticipated Disposition: Return to facilty   Anticipated Discharge: Greater than 48 hours     Hospital Problems  Date Reviewed: 9/1/2021          Codes Class Noted POA    Gangrene of left foot [facility]) ICD-10-CM: M41  ICD-9-CM: 785.4  11/14/2021 Unknown                Review of Systems:   A comprehensive review of systems was negative except for that written in the HPI. Vital Signs:    Last 24hrs VS reviewed since prior progress note. Most recent are:  Visit Vitals  BP (!) 148/70 (BP 1 Location: Left lower arm, BP Patient Position: At rest)   Pulse (!) 102   Temp 97.7 °F (36.5 °C)   Resp 16   SpO2 95%         Intake/Output Summary (Last 24 hours) at 11/14/2021 0941  Last data filed at 11/14/2021 0150  Gross per 24 hour   Intake 50 ml   Output    Net 50 ml        Physical Examination:             Constitutional:  No acute distress, cooperative, pleasant, blind   ENT:  Oral mucosa moist, oropharynx benign. Resp:  CTA bilaterally. No wheezing/rhonchi/rales. No accessory muscle use   CV:  Regular rhythm, normal rate, no murmurs, gallops, rubs    GI:  Soft, non distended, non tender. normoactive bowel sounds    Musculoskeletal:  No edema, warm, 2+ pulses throughout    Neurologic:  Moves all extremities with generalized weakness. AAOx3, CN II-XII reviewed  Skin LLE wounds (PTA)- see pictures in chart. Psych:  Not anxious or agitated                           :  dialysis graft- left thigh.         Data Review:    Review and/or order of clinical lab test  Review and/or order of tests in the medicine section of CPT      Labs:     Recent Labs     11/13/21 2137   WBC 20.1*   HGB 10.4*   HCT 34.0*        Recent Labs     11/13/21 2137   *   K 4.0   CL 97   CO2 34*   BUN 26*   CREA 3.99*   *   CA 8.2*     Recent Labs     11/13/21 2137   ALT 54   *   TBILI 0.3   TP 5.3*   ALB 1.3*   GLOB 4.0     Recent Labs     11/13/21 2137   INR 1.6*   PTP 16.7*      No results for input(s): FE, TIBC, PSAT, FERR in the last 72 hours. No results found for: FOL, RBCF   No results for input(s): PH, PCO2, PO2 in the last 72 hours. No results for input(s): CPK, CKNDX, TROIQ in the last 72 hours.     No lab exists for component: CPKMB  Lab Results   Component Value Date/Time    Cholesterol, total 189 08/12/2011 10:06 AM    HDL Cholesterol 37 (L) 08/12/2011 10:06 AM    LDL, calculated 127 (H) 08/12/2011 10:06 AM    Triglyceride 125 08/12/2011 10:06 AM     Lab Results   Component Value Date/Time    Glucose (POC) 92 10/30/2020 11:00 AM    Glucose (POC) 82 10/30/2020 06:50 AM    Glucose (POC) 106 (H) 09/25/2020 08:51 AM    Glucose (POC) 94 02/01/2019 02:18 PM    Glucose (POC) 112 (H) 02/01/2019 12:01 PM    Glucose (POC) 139 (H) 11/30/2012 05:25 PM    Glucose (POC) 157 (H) 11/30/2012 02:01 PM    Glucose (POC) 144 (H) 12/12/2011 11:43 AM    Glucose (POC) 111 (H) 12/12/2011 08:50 AM     No results found for: COLOR, APPRN, SPGRU, REFSG, JOYA, PROTU, GLUCU, KETU, BILU, UROU, LAWRENCE, LEUKU, GLUKE, EPSU, BACTU, WBCU, RBCU, CASTS, UCRY      Medications Reviewed:     Current Facility-Administered Medications   Medication Dose Route Frequency    Vancomycin Pharmacy Dosing   Other Rx Dosing/Monitoring    atorvastatin (LIPITOR) tablet 20 mg  20 mg Oral QHS    fludrocortisone (FLORINEF) tablet 0.1 mg  0.1 mg Oral DAILY    midodrine (PROAMATINE) tablet 10 mg  10 mg Oral TIDPC    oxyCODONE IR (ROXICODONE) tablet 5 mg  5 mg Oral Q6H PRN    pantoprazole (PROTONIX) tablet 20 mg  20 mg Oral DAILY    pregabalin (LYRICA) capsule 25 mg  25 mg Oral DAILY    tiZANidine (ZANAFLEX) tablet 2 mg  2 mg Oral BID    piperacillin-tazobactam (ZOSYN) 3.375 g in 0.9% sodium chloride (MBP/ADV) 100 mL MBP  3.375 g IntraVENous Q12H     ______________________________________________________________________  EXPECTED LENGTH OF STAY: - - -  ACTUAL LENGTH OF STAY:          0                 Gama Thompson, NP

## 2021-11-14 NOTE — ED TRIAGE NOTES
Pt reports that he has had a worsening wound on his 4th toe on his left foot for the past month as well as a wound to the inside of his left lower leg. According to ems the pt did not have a palpable pedal pulse on the left side. Pt is a diabetic, bs enroute was 146. All other vitals wnl.

## 2021-11-14 NOTE — H&P
History & Physical    Primary Care Provider: Brittani Puckett MD  Source of Information: Patient and chart review    History of Presenting Illness:   Debra Snyder is a 66 y.o. male with hx of orthostatic hypotension, ESRD on HD, Blindness, cad who presents to hospital from LTCF for evaluation of left foot infection. Pt states he has had pain in his left foot for the last month. Unable to describe any additional changes due to his poor eye-sight. States he has otherwise been doing and feeling well. The patient denies any fever, chills, chest or abdominal pain, nausea, vomiting, cough, congestion, recent illness, palpitations, or dysuria. Remarkable vitals on ER Presentations: unremarkable  Labs Remarkable for: wbc 20.1, hgb 10.4, crp 18.90, esr 41,   ER Images: CT LLE: Suboptimal examination secondary to motion artifact. No definite bony  destruction is evident. Diffuse osteopenia. Diffuse soft tissue swelling in the  left foot. Review of Systems:  A comprehensive review of systems was negative except for that written in the History of Present Illness.      Past Medical History:   Diagnosis Date    Adverse effect of anesthesia     [address]'s I had a little problem with my lungs, like they tried to colapse, but they got me squared away\"    Arthritis     CAD (coronary artery disease)     Chronic kidney disease     ESRD, [facility] [place]/T,Thurs,Sat    Diabetes ([address]     type 2    Heart failure ([address] 2009    Hyperlipidemia     Hypertension     Legal blindness     Orthostatic hypotension     SOB (shortness of breath)     O2 AT 2LPM PRN    Thyroid disease     hyperparathyroidism    Vitamin D deficiency       Past Surgical History:   Procedure Laterality Date    HX APPENDECTOMY      HX HEENT      lasik,  left optic nerve surgery    HX VASCULAR ACCESS      right dialysis access femoral    VT ABDOMEN SURGERY PROC UNLISTED  11/01/2018    lower abdominal bowels removed    VASCULAR SURGERY PROCEDURE UNLIST      fistula r arm,      Prior to Admission medications    Medication Sig Start Date End Date Taking? Authorizing Provider   apixaban (ELIQUIS) 5 mg tablet Take 5 mg by mouth two (2) times a day. Provider, Historical   clopidogreL (PLAVIX) 75 mg tab Take  by mouth. Provider, Historical   mineral oil-isopropyl myristat (EUCERIN) lotion Apply  to affected area as needed for Dry Skin. Provider, Historical   glipiZIDE SR (Glucotrol XL) 5 mg CR tablet Take 5 mg by mouth daily. Provider, Historical   pollen extracts (PROSTAT PO) Take 30 mL by mouth daily. Provider, Historical   tiZANidine (ZANAFLEX) 2 mg tablet Take 2 mg by mouth two (2) times a day. Provider, Historical   ascorbic acid, vitamin C, (Vitamin C) 500 mg tablet Take 500 mg by mouth daily. Provider, Historical   ergocalciferol, vitamin D2, (VITAMIN D2 PO) Take 50 mcg by mouth daily. Provider, Historical   midodrine (PROAMITINE) 10 mg tablet Take 10 mg by mouth three (3) times daily. Indications: a feeling of dizziness upon standing due to a drop in blood pressure    Provider, Historical   atorvastatin (LIPITOR) 10 mg tablet Take 20 mg by mouth nightly. Provider, Historical   loperamide (IMODIUM) 2 mg capsule Take 2 mg by mouth every four (4) hours as needed for Diarrhea. Provider, Historical   b complex-vitamin c-folic acid (NEPHROCAPS) 1 mg capsule Take 1 Cap by mouth daily. Provider, Historical   ondansetron hcl (ZOFRAN) 4 mg tablet Take 4 mg by mouth every six (6) hours as needed for Nausea. Provider, Historical   phenyleph/pramoxin/glycr/w.pet (PREPARATION H MAXIMUM STRENGTH RE) Insert  into rectum. PREPARATION H CREAM 5-14/4%. INSERT 1 APPLICATION RECTALLY EVERY 4 HRS AS NEEDED FOR HEMORRHOIDS. Provider, Historical   TUCKS, WITCH HAZEL, EX by Apply Externally route as needed.  APPLY  TO RECTAL AREA TOPICALLY AY EVERY 8 HRS AS NEEDED FOR HEMORROIDS Provider, Historical   acetaminophen (TYLENOL EXTRA STRENGTH) 500 mg tablet Take 1,000 mg by mouth every six (6) hours as needed for Pain. Provider, Historical     Allergies   Allergen Reactions    Adhesive Tape-Silicones Itching     Itchiness       Family History   Problem Relation Age of Onset    Hypertension Mother     Diabetes Mother     Hypertension Father     Diabetes Father     Hypertension Sister         SOCIAL HISTORY:  Patient resides:              LTCF  Independently    Assisted Living    SNF    With family care       Smoking history:   None X   Former    Chronic      Alcohol history:   None X   Social    Chronic      Ambulates:   Independently X   w/cane    w/walker    w/wc    CODE STATUS:  DNR    Full X   Other      Objective:     Physical Exam:     Visit Vitals  BP (!) 149/135   Pulse 94   Temp 99.5 °F (37.5 °C)   Resp 20   SpO2 99%      O2 Device: None (Room air)    General:  Alert, cooperative, no distress, appears stated age. Head:  Normocephalic, without obvious abnormality, atraumatic. Eyes:  Conjunctivae/corneas clear. PERRL, EOMs intact. Nose: Nares normal. Septum midline. Mucosa normal.        Neck: Supple, symmetrical, trachea midline,       Lungs:   Clear to auscultation bilaterally. Chest wall:  No tenderness or deformity. Heart:  Regular rate and rhythm, S1, S2 normal   Abdomen:   Soft, non-tender. Bowel sounds normal. No masses,  No organomegaly. Extremities: Extremities normal, atraumatic, no cyanosis or edema. Pulses: 2+ and symmetric all extremities. Skin: See images. Left calf wound w/ purulent drainage             Neurologic: CNII-XII intact.       Data Review:     Recent Days:  Recent Labs     11/13/21 2137   WBC 20.1*   HGB 10.4*   HCT 34.0*        Recent Labs     11/13/21 2137   *   K 4.0   CL 97   CO2 34*   *   BUN 26*   CREA 3.99*   CA 8.2*   ALB 1.3*   ALT 54   INR 1.6*     No results for input(s): PH, PCO2, PO2, HCO3, FIO2 in the last 72 hours. 24 Hour Results:  Recent Results (from the past 24 hour(s))   CBC WITH AUTOMATED DIFF    Collection Time: 11/13/21  9:37 PM   Result Value Ref Range    WBC 20.1 (H) 4.1 - 11.1 K/uL    RBC 3.57 (L) 4.10 - 5.70 M/uL    HGB 10.4 (L) 12.1 - 17.0 g/dL    HCT 34.0 (L) 36.6 - 50.3 %    MCV 95.2 80.0 - 99.0 FL    MCH 29.1 26.0 - 34.0 PG    MCHC 30.6 30.0 - 36.5 g/dL    RDW 14.7 (H) 11.5 - 14.5 %    PLATELET 779 908 - 735 K/uL    MPV 9.4 8.9 - 12.9 FL    NRBC 0.0 0  WBC    ABSOLUTE NRBC 0.00 0.00 - 0.01 K/uL    NEUTROPHILS 86 (H) 32 - 75 %    LYMPHOCYTES 6 (L) 12 - 49 %    MONOCYTES 6 5 - 13 %    EOSINOPHILS 1 0 - 7 %    BASOPHILS 0 0 - 1 %    IMMATURE GRANULOCYTES 1 (H) 0.0 - 0.5 %    ABS. NEUTROPHILS 17.4 (H) 1.8 - 8.0 K/UL    ABS. LYMPHOCYTES 1.2 0.8 - 3.5 K/UL    ABS. MONOCYTES 1.1 (H) 0.0 - 1.0 K/UL    ABS. EOSINOPHILS 0.1 0.0 - 0.4 K/UL    ABS. BASOPHILS 0.1 0.0 - 0.1 K/UL    ABS. IMM. GRANS. 0.2 (H) 0.00 - 0.04 K/UL    DF AUTOMATED     PROTHROMBIN TIME + INR    Collection Time: 11/13/21  9:37 PM   Result Value Ref Range    INR 1.6 (H) 0.9 - 1.1      Prothrombin time 16.7 (H) 9.0 - 79.2 sec   METABOLIC PANEL, COMPREHENSIVE    Collection Time: 11/13/21  9:37 PM   Result Value Ref Range    Sodium 135 (L) 136 - 145 mmol/L    Potassium 4.0 3.5 - 5.1 mmol/L    Chloride 97 97 - 108 mmol/L    CO2 34 (H) 21 - 32 mmol/L    Anion gap 4 (L) 5 - 15 mmol/L    Glucose 141 (H) 65 - 100 mg/dL    BUN 26 (H) 6 - 20 MG/DL    Creatinine 3.99 (H) 0.70 - 1.30 MG/DL    BUN/Creatinine ratio 7 (L) 12 - 20      GFR est AA 18 (L) >60 ml/min/1.73m2    GFR est non-AA 15 (L) >60 ml/min/1.73m2    Calcium 8.2 (L) 8.5 - 10.1 MG/DL    Bilirubin, total 0.3 0.2 - 1.0 MG/DL    ALT (SGPT) 54 12 - 78 U/L    AST (SGOT) 27 15 - 37 U/L    Alk.  phosphatase 169 (H) 45 - 117 U/L    Protein, total 5.3 (L) 6.4 - 8.2 g/dL    Albumin 1.3 (L) 3.5 - 5.0 g/dL    Globulin 4.0 2.0 - 4.0 g/dL    A-G Ratio 0.3 (L) 1.1 - 2.2     C REACTIVE PROTEIN, QT    Collection Time: 11/13/21  9:37 PM   Result Value Ref Range    C-Reactive protein 18.90 (H) 0.00 - 0.60 mg/dL   SED RATE (ESR)    Collection Time: 11/13/21  9:37 PM   Result Value Ref Range    Sed rate, automated 41 (H) 0 - 20 mm/hr         Imaging:     Assessment:     Debra Snyder is a 66 y.o. male with hx of orthostatic hypotension, ESRD on HD, Blindness, cad who is admitted for cellulitis and gangrene of LLE digits       Plan:       Cellulitis of Left Foot w/ Gagrene of LLE 3rd et 4th Digit  -Continue with vanc+zosyn  -Follow blood and wound cultures  -Vascular surgery consult  -Keep NPO  -Hold eliquis, asa and plavix    ESRD on HD  -Nephro consult for HD    CAD  -Hold eliquis, asa and plavix    NIDDM II w/ Diabetic Neuropathy  -SSI + Hypoglycemic protocols  -Accuchecks q6 while npo    Orthostatic Hypotension  -Home midrodine and fludrocortisone            FEN/GI -  NPO  Activity - As tolerated  DVT prophylaxis - SCDs  GI prophylaxis -  NI  Disposition - TBD - Likely back to LTCF    CODE STATUS:  Full code       Signed By: Elvi Maier MD     November 14, 2021

## 2021-11-15 NOTE — PROGRESS NOTES
[facility]   [address] [facility], [facility] [address], [facility]  Phone: [phone]   [phone]       Nephrology Progress Note  Heather Sauceda.     1950     [id number]  Date of Admission : 11/13/2021  11/15/21    CC: Follow up for ESRD       Assessment and Plan   ESRD- HD  - dialyzes TTS at [facility] [address]   - Next HD tomorrow   - has Lft thigh AVG for access    Left LE cellulitis   Left foot Wet gangrene   - for AKA today    Anemia in CKD  - continue SUNNY   expect Hb drop post op     Sec HPTH   - continue home binders     Chronic Hypotension   - continue Midodrine and Florinef      Interval History:  Seen and examined. He is having hard time accepting AKA but was ok after some discussion. Review of Systems: A comprehensive review of systems was negative except for that written in the HPI.     Current Medications:   Current Facility-Administered Medications   Medication Dose Route Frequency    traMADoL (ULTRAM) tablet 50 mg  50 mg Oral Q6H PRN    Vancomycin Pharmacy Dosing   Other Rx Dosing/Monitoring    atorvastatin (LIPITOR) tablet 20 mg  20 mg Oral QHS    fludrocortisone (FLORINEF) tablet 0.1 mg  0.1 mg Oral DAILY    midodrine (PROAMATINE) tablet 10 mg  10 mg Oral TIDPC    oxyCODONE IR (ROXICODONE) tablet 5 mg  5 mg Oral Q6H PRN    pantoprazole (PROTONIX) tablet 20 mg  20 mg Oral DAILY    pregabalin (LYRICA) capsule 25 mg  25 mg Oral DAILY    tiZANidine (ZANAFLEX) tablet 2 mg  2 mg Oral BID    piperacillin-tazobactam (ZOSYN) 3.375 g in 0.9% sodium chloride (MBP/ADV) 100 mL MBP  3.375 g IntraVENous Q12H    insulin regular (NOVOLIN R, HUMULIN R) injection   SubCUTAneous TIDAC    glucose chewable tablet 16 g  4 Tablet Oral PRN    dextrose (D50W) injection syrg 12.5-25 g  12.5-25 g IntraVENous PRN    glucagon (GLUCAGEN) injection 1 mg  1 mg IntraMUSCular PRN      Allergies   Allergen Reactions    Adhesive Tape-Silicones Itching     Itchiness        Objective:  Vitals:    Vitals: 11/15/21 0548 11/15/21 0846 11/15/21 1023 11/15/21 1216   BP:  (!) 129/58     Pulse: 93 (!) 101 (!) 106 98   Resp:  20     Temp:  98.2 °F (36.8 °C)     SpO2:  93%       Intake and Output:  No intake/output data recorded. 11/13 1901 - 11/15 0700  In: 50 [I.V.:50]  Out: -     Physical Examination:  GEN: NAD,Blind  NECK- Supple, no mass  RESP: No wheezing, Clear b/l  CVS: S1,S2  RRR  NEURO: Normal speech, Non focal  EXT: Left foot third and fourth digit gangrenous, left calf induration and and exposed wound  PSYCH:anxious  []    High complexity decision making was performed  []    Patient is at high-risk of decompensation with multiple organ involvement    Lab Data Personally Reviewed: I have reviewed all the pertinent labs, microbiology data and radiology studies during assessment.     Recent Labs     11/15/21  0556 11/13/21 2137   * 135*   K 4.1 4.0    97   CO2 28 34*   GLU 60* 141*   BUN 38* 26*   CREA 5.36* 3.99*   CA 8.4* 8.2*   ALB  --  1.3*   ALT  --  54   INR  --  1.6*     Recent Labs     11/15/21  0556 11/13/21 2137   WBC 15.7* 20.1*   HGB 10.6* 10.4*   HCT 33.9* 34.0*    315     No results found for: [facility]  Lab Results   Component Value Date/Time    Culture result: PENDING 11/14/2021 01:54 AM    Culture result: NO GROWTH 1 DAY 11/13/2021 11:55 PM     Recent Results (from the past 24 hour(s))   LACTIC ACID    Collection Time: 11/14/21  4:38 PM   Result Value Ref Range    Lactic acid 1.5 0.4 - 2.0 MMOL/L   METABOLIC PANEL, BASIC    Collection Time: 11/15/21  5:56 AM   Result Value Ref Range    Sodium 135 (L) 136 - 145 mmol/L    Potassium 4.1 3.5 - 5.1 mmol/L    Chloride 100 97 - 108 mmol/L    CO2 28 21 - 32 mmol/L    Anion gap 7 5 - 15 mmol/L    Glucose 60 (L) 65 - 100 mg/dL    BUN 38 (H) 6 - 20 MG/DL    Creatinine 5.36 (H) 0.70 - 1.30 MG/DL    BUN/Creatinine ratio 7 (L) 12 - 20      GFR est AA 13 (L) >60 ml/min/1.73m2    GFR est non-AA 11 (L) >60 ml/min/1.73m2    Calcium 8.4 (L) 8.5 - 10.1 MG/DL   CBC WITH AUTOMATED DIFF    Collection Time: 11/15/21  5:56 AM   Result Value Ref Range    WBC 15.7 (H) 4.1 - 11.1 K/uL    RBC 3.61 (L) 4.10 - 5.70 M/uL    HGB 10.6 (L) 12.1 - 17.0 g/dL    HCT 33.9 (L) 36.6 - 50.3 %    MCV 93.9 80.0 - 99.0 FL    MCH 29.4 26.0 - 34.0 PG    MCHC 31.3 30.0 - 36.5 g/dL    RDW 14.7 (H) 11.5 - 14.5 %    PLATELET 388 218 - 966 K/uL    MPV 9.1 8.9 - 12.9 FL    NRBC 0.0 0  WBC    ABSOLUTE NRBC 0.00 0.00 - 0.01 K/uL    NEUTROPHILS 80 (H) 32 - 75 %    LYMPHOCYTES 10 (L) 12 - 49 %    MONOCYTES 6 5 - 13 %    EOSINOPHILS 2 0 - 7 %    BASOPHILS 1 0 - 1 %    IMMATURE GRANULOCYTES 1 (H) 0.0 - 0.5 %    ABS. NEUTROPHILS 12.7 (H) 1.8 - 8.0 K/UL    ABS. LYMPHOCYTES 1.6 0.8 - 3.5 K/UL    ABS. MONOCYTES 1.0 0.0 - 1.0 K/UL    ABS. EOSINOPHILS 0.3 0.0 - 0.4 K/UL    ABS. BASOPHILS 0.1 0.0 - 0.1 K/UL    ABS. IMM. GRANS. 0.1 (H) 0.00 - 0.04 K/UL    DF AUTOMATED     GLUCOSE, POC    Collection Time: 11/15/21  6:47 AM   Result Value Ref Range    Glucose (POC) 68 65 - 117 mg/dL    Performed by Melina Vicente, POC    Collection Time: 11/15/21  7:32 AM   Result Value Ref Range    Glucose (POC) 102 65 - 117 mg/dL    Performed by Malathi Saldaña, POC    Collection Time: 11/15/21 12:16 PM   Result Value Ref Range    Glucose (POC) 67 65 - 117 mg/dL    Performed by Dread Thrasher            Total time spent with patient:  xxx   min. Care Plan discussed with:  Patient     Family      RN      Consulting Physician [facility],         I have reviewed the flowsheets. Chart and Pertinent Notes have been reviewed. No change in PMH ,family and social history from Consult note.       Iveth Roman MD

## 2021-11-15 NOTE — PROGRESS NOTES
Physician Progress Note      PATIENT:               Simran Daniel  [facility] #:                  [id number]  :                       1950  ADMIT DATE:       2021 7:44 PM  [address] [place] DATE:  RESPONDING  PROVIDER #:        Td Omer NP          QUERY TEXT:    Dear attending,    Pt admitted with a diabetic foot infection. Pt noted to have elevated WBC, elevated neutrophils, and elevated CRP. If possible, please document in the progress notes and discharge summary if you are evaluating and /or treating any of the following: The medical record reflects the following:  Risk Factors:Hx. chronic LLE ischemia  Clinical Indicators: -WBC 20.1, neutrophils 86, CRP 18.9011/15- WBC 15.7, neutrophils 80  Per H&P- Cellulitis of Left Foot w/ Gagrene of LLE 3rd et 4th Digit -Continue with vanc+zosyn -Follow blood and wound cultures  -Per vascular surgery- 65 y/o AAM with chronic LLE ischemia and a DM foot infection  Treatment: IV Vancocinx 1 on , IV Cleocin 600 mg x 1, IV Zosyn 3.375g q12 hours, monitor vital signs, labwork, imaging    Thank you,  Nora Glasgow RN, BSN  Clinical documentation Improvement  [phone]  Options provided:  -- Sepsis, present on admission  -- No Sepsis  -- Other - I will add my own diagnosis  -- Disagree - Not applicable / Not valid  -- Disagree - Clinically unable to determine / Unknown  -- Refer to Clinical Documentation Reviewer    PROVIDER RESPONSE TEXT:    This patient has sepsis which was present on admission.     Query created by: Gillian Briseno on 11/15/2021 9:20 AM      Electronically signed by:  Td Omer NP 11/15/2021 12:02 PM

## 2021-11-15 NOTE — PROGRESS NOTES
[facility] [facility] Adult  Hospitalist Group                                                                                          Hospitalist Progress Note  Tremaine Sainz NP  Answering service: [phone] OR [phone] from in house phone        Date of Service:  11/15/2021  NAME:  Tan Davila. :  1950  MRN:  [id number]      Admission Summary:   Lauren Crandall Jr. is a 66 y. o. male with hx of orthostatic hypotension, ESRD on HD, Blindness, cad who presents to hospital from LTCF for evaluation of left foot infection. Pt states he has had pain in his left foot for the last month. Unable to describe any additional changes due to his poor eye-sight. States he has otherwise been doing and feeling well. The patient denies any fever, chills, chest or abdominal pain, nausea, vomiting, cough, congestion, recent illness, palpitations, or dysuria. Remarkable vitals on ER Presentations: unremarkable  Labs Remarkable for: wbc 20.1, hgb 10.4, crp 18.90, esr 41,   ER Images: CT LLE: Suboptimal examination secondary to motion artifact. No definite bony  destruction is evident. Diffuse osteopenia. Diffuse soft tissue swelling in the  left foot. Interval history / Subjective:    Seen and examined patient sitting up in bed. States that he is doing fine and pain in left leg is controlled. Has remained NPO since midnight for surgery today. No new complaints. No overnight events noted.       Assessment & Plan:       Osteomyelitis,  Left Foot w/ Gagrene of LLE 3rd et 4th Digit (POA)   -Continue with vanc+zosyn  -Follow blood and wound cultures  -Hold eliquis, asa and plavix  - Vascular surgery following   - Podiatry following   - Keep NPO for OR today  - Plan for AKA today      ESRD on HD  -Nephro following for HD management   - Patient is TTS schedule      CAD  -Hold eliquis, asa and plavix     NIDDM II w/ Diabetic Neuropathy  -SSI + Hypoglycemic protocols  -Accuchecks q6 while npo     Orthostatic Hypotension  -Home midrodine and fludrocortisone     Code status: Full   DVT prophylaxis:     Care Plan discussed with: Patient/Family and Nurse  Anticipated Disposition: Return to facility   Anticipated Discharge: Greater than 48 hours     Hospital Problems  Date Reviewed: 9/1/2021          Codes Class Noted POA    Gangrene of left foot [facility]) ICD-10-CM: S88  ICD-9-CM: 785.4  11/14/2021 Unknown                Review of Systems:   A comprehensive review of systems was negative except for that written in the HPI. Vital Signs:    Last 24hrs VS reviewed since prior progress note. Most recent are:  Visit Vitals  /75 (BP 1 Location: Left lower arm, BP Patient Position: At rest)   Pulse 93   Temp 98.8 °F (37.1 °C)   Resp 16   SpO2 94%       No intake or output data in the 24 hours ending 11/15/21 0609     Physical Examination:             Constitutional:  No acute distress, cooperative, pleasant    ENT:  Oral mucosa moist, oropharynx benign. Resp:  CTA bilaterally. No wheezing/rhonchi/rales. No accessory muscle use   CV:  Regular rhythm, normal rate, no murmurs, gallops, rubs    GI:  Soft, non distended, non tender. normoactive bowel sounds, no hepatosplenomegaly     Musculoskeletal:  No edema, warm, 2+ pulses throughout    Neurologic:  Moves all extremities. AAOx3, CN II-XII reviewed     Psych:  Not anxious or agitated                           Skin: LLE wounds - See pictures in chart                           : Dialysis graft- left thigh.         Data Review:    Review and/or order of clinical lab test  Review and/or order of tests in the medicine section of CPT      Labs:     Recent Labs     11/15/21  0556 11/13/21 2137   WBC 15.7* 20.1*   HGB 10.6* 10.4*   HCT 33.9* 34.0*    315     Recent Labs     11/13/21 2137   *   K 4.0   CL 97   CO2 34*   BUN 26*   CREA 3.99*   *   CA 8.2*     Recent Labs     11/13/21 2137   ALT 54   *   TBILI 0.3   TP 5.3*   ALB 1.3*   GLOB 4.0 Recent Labs     11/13/21 2137   INR 1.6*   PTP 16.7*      No results for input(s): FE, TIBC, PSAT, FERR in the last 72 hours. No results found for: FOL, RBCF   No results for input(s): PH, PCO2, PO2 in the last 72 hours. No results for input(s): CPK, CKNDX, TROIQ in the last 72 hours.     No lab exists for component: CPKMB  Lab Results   Component Value Date/Time    Cholesterol, total 189 08/12/2011 10:06 AM    HDL Cholesterol 37 (L) 08/12/2011 10:06 AM    LDL, calculated 127 (H) 08/12/2011 10:06 AM    Triglyceride 125 08/12/2011 10:06 AM     Lab Results   Component Value Date/Time    Glucose (POC) 92 10/30/2020 11:00 AM    Glucose (POC) 82 10/30/2020 06:50 AM    Glucose (POC) 106 (H) 09/25/2020 08:51 AM    Glucose (POC) 94 02/01/2019 02:18 PM    Glucose (POC) 112 (H) 02/01/2019 12:01 PM    Glucose (POC) 139 (H) 11/30/2012 05:25 PM    Glucose (POC) 157 (H) 11/30/2012 02:01 PM    Glucose (POC) 144 (H) 12/12/2011 11:43 AM    Glucose (POC) 111 (H) 12/12/2011 08:50 AM     No results found for: COLOR, APPRN, SPGRU, REFSG, JOYA, PROTU, GLUCU, KETU, BILU, UROU, LAWRENEC, LEUKU, GLUKE, EPSU, BACTU, WBCU, RBCU, CASTS, UCRY      Medications Reviewed:     Current Facility-Administered Medications   Medication Dose Route Frequency    traMADoL (ULTRAM) tablet 50 mg  50 mg Oral Q6H PRN    Vancomycin Pharmacy Dosing   Other Rx Dosing/Monitoring    atorvastatin (LIPITOR) tablet 20 mg  20 mg Oral QHS    fludrocortisone (FLORINEF) tablet 0.1 mg  0.1 mg Oral DAILY    midodrine (PROAMATINE) tablet 10 mg  10 mg Oral TIDPC    oxyCODONE IR (ROXICODONE) tablet 5 mg  5 mg Oral Q6H PRN    pantoprazole (PROTONIX) tablet 20 mg  20 mg Oral DAILY    pregabalin (LYRICA) capsule 25 mg  25 mg Oral DAILY    tiZANidine (ZANAFLEX) tablet 2 mg  2 mg Oral BID    piperacillin-tazobactam (ZOSYN) 3.375 g in 0.9% sodium chloride (MBP/ADV) 100 mL MBP  3.375 g IntraVENous Q12H    insulin regular (NOVOLIN R, HUMULIN R) injection   SubCUTAneous TIDAC    glucose chewable tablet 16 g  4 Tablet Oral PRN    dextrose (D50W) injection syrg 12.5-25 g  12.5-25 g IntraVENous PRN    glucagon (GLUCAGEN) injection 1 mg  1 mg IntraMUSCular PRN     ______________________________________________________________________  EXPECTED LENGTH OF STAY: - - -  ACTUAL LENGTH OF STAY:          [address] Cervantes, NP

## 2021-11-16 NOTE — PROGRESS NOTES
Attempted to give report to nurse receiving patient on [address], but the nurse is unavailable at this time.

## 2021-11-16 NOTE — PROGRESS NOTES
[facility]   [address] [address], [facility] [address], [facility]  Phone: [phone]   [phone]       Nephrology Progress Note  Ivana Dumont.     1950     [id number]  Date of Admission : 11/13/2021 11/16/21    CC: Follow up for ESRD       Assessment and Plan   ESRD- HD  - dialyzes TTS at 3 chopt   - HD today post op  - ordered D5W while NPO for surgery  . Can be stopped post op   - has Lft thigh AVG for access    Left LE cellulitis   Left foot Wet gangrene   - for AKA today    Anemia in CKD  - continue SUNNY   expect Hb drop post op     Sec HPTH   - continue home binders     Chronic Hypotension   - continue Midodrine and Florinef      Interval History:  Seen and examined. AKA postponed to today. BG low yesterday while NPO and w/ active infection   No new sx this morning      Review of Systems: A comprehensive review of systems was negative except for that written in the HPI.     Current Medications:   Current Facility-Administered Medications   Medication Dose Route Frequency    dextrose 5% infusion  50 mL/hr IntraVENous CONTINUOUS    traMADoL (ULTRAM) tablet 50 mg  50 mg Oral Q6H PRN    epoetin nabila-epbx (RETACRIT) injection 20,000 Units  20,000 Units SubCUTAneous Q TUE, THU & SAT    Vancomycin Pharmacy Dosing   Other Rx Dosing/Monitoring    atorvastatin (LIPITOR) tablet 20 mg  20 mg Oral QHS    fludrocortisone (FLORINEF) tablet 0.1 mg  0.1 mg Oral DAILY    midodrine (PROAMATINE) tablet 10 mg  10 mg Oral TIDPC    oxyCODONE IR (ROXICODONE) tablet 5 mg  5 mg Oral Q6H PRN    pantoprazole (PROTONIX) tablet 20 mg  20 mg Oral DAILY    pregabalin (LYRICA) capsule 25 mg  25 mg Oral DAILY    tiZANidine (ZANAFLEX) tablet 2 mg  2 mg Oral BID    piperacillin-tazobactam (ZOSYN) 3.375 g in 0.9% sodium chloride (MBP/ADV) 100 mL MBP  3.375 g IntraVENous Q12H    insulin regular (NOVOLIN R, HUMULIN R) injection   SubCUTAneous TIDAC    glucose chewable tablet 16 g  4 Tablet Oral PRN    dextrose (D50W) injection syrg 12.5-25 g  12.5-25 g IntraVENous PRN    glucagon (GLUCAGEN) injection 1 mg  1 mg IntraMUSCular PRN      Allergies   Allergen Reactions    Adhesive Tape-Silicones Itching     Itchiness        Objective:  Vitals:    Vitals:    11/16/21 0136 11/16/21 0211 11/16/21 0357 11/16/21 0600   BP: 103/69      Pulse: 95 90 (!) 103 (!) 115   Resp: 16      Temp: 98.2 °F (36.8 °C)      SpO2: 96%        Intake and Output:  No intake/output data recorded. No intake/output data recorded. Physical Examination:  GEN: NAD,Blind  NECK- Supple, no mass  RESP: No wheezing, Clear b/l  CVS: S1,S2  RRR  NEURO: Normal speech, Non focal  EXT: Left foot third and fourth digit gangrenous, left calf induration and and exposed wound  PSYCH:anxious  []    High complexity decision making was performed  []    Patient is at high-risk of decompensation with multiple organ involvement    Lab Data Personally Reviewed: I have reviewed all the pertinent labs, microbiology data and radiology studies during assessment.     Recent Labs     11/15/21  0556 11/13/21 2137   * 135*   K 4.1 4.0    97   CO2 28 34*   GLU 60* 141*   BUN 38* 26*   CREA 5.36* 3.99*   CA 8.4* 8.2*   ALB  --  1.3*   ALT  --  54   INR  --  1.6*     Recent Labs     11/15/21  0556 11/13/21  2137   WBC 15.7* 20.1*   HGB 10.6* 10.4*   HCT 33.9* 34.0*    315     No results found for: SDES  Lab Results   Component Value Date/Time    Culture result: HEAVY MIXED ENTERIC GRAM NEGATIVE RODS SO FAR (A) 11/14/2021 01:54 AM    Culture result: NO GROWTH 1 DAY 11/13/2021 11:55 PM     Recent Results (from the past 24 hour(s))   GLUCOSE, POC    Collection Time: 11/15/21  6:47 AM   Result Value Ref Range    Glucose (POC) 68 65 - 117 mg/dL    Performed by [address] [place] [address] St, POC    Collection Time: 11/15/21  7:32 AM   Result Value Ref Range    Glucose (POC) 102 65 - 117 mg/dL    Performed by Kirsten Abraham 1154, POC    Collection Time: 11/15/21 12:16 PM   Result Value Ref Range    Glucose (POC) 67 65 - 117 mg/dL    Performed by Dorcus Soler    GLUCOSE, POC    Collection Time: 11/15/21  1:02 PM   Result Value Ref Range    Glucose (POC) 99 65 - 117 mg/dL    Performed by Dorcus Soler    GLUCOSE, POC    Collection Time: 11/15/21  4:32 PM   Result Value Ref Range    Glucose (POC) 60 (L) 65 - 117 mg/dL    Performed by Dorcus Soler    GLUCOSE, POC    Collection Time: 11/15/21  5:12 PM   Result Value Ref Range    Glucose (POC) 74 65 - 117 mg/dL    Performed by Dorcus Soler            Total time spent with patient:  xxx   min. Care Plan discussed with:  Patient     Family      RN      Consulting Physician [facility],         I have reviewed the flowsheets. Chart and Pertinent Notes have been reviewed. No change in PMH ,family and social history from Consult note.       Madyson Saunders MD

## 2021-11-16 NOTE — PROGRESS NOTES
JONATHAN:    RUR 17%    Disposition: [facility] LTC. Referral sent via [facility]. Transportation: S    Follow up: PCP/Specialist    Primary contact: Stacey Bolivar(Friend, Gabriela) [phone]    Care Management Interventions  PCP Verified by CM: Yes  Palliative Care Criteria Met (RRAT>21 & CHF Dx)?: No  Mode of Transport at Discharge: BLS  Transition of Care Consult (CM Consult): [address]: No  Physical Therapy Consult: Yes  Occupational Therapy Consult: Yes  Speech Therapy Consult: No  Support Systems: [place]  Confirm Follow Up Transport: Other (see comment) (Facility provides)  Discharge Location  Discharge Placement: [address]    Reason for Admission:  Left foot infection. RUR Score:    17%                 Plan for utilizing home health:  No orders        PCP: First and Last name:  Amauri Mcgill MD     Name of Practice: [facility]   Are you a current patient: Yes/No: Yes   Approximate date of last visit:    Can you participate in a virtual visit with your PCP: No                    Current Advanced Directive/Advance Care Plan: Full Code      Healthcare Decision Maker:   Click here to complete [facility] including selection of the Healthcare Decision Maker Relationship (ie \"Primary\")                             Transition of Care Plan:       CM met with patient's friend, Juan Antonio Boyd to introduce CM role, verify demographics and begin discharge planning. Patient lives at [facility]. He uses a wheelchair. Patient's prescriptions are provided at LT. Insurance verified: [employer] primary and [facility] Medicaid secondary. BLS at discharge.     Tali Perera RN/CRM  [phone]

## 2021-11-16 NOTE — PROGRESS NOTES
[facility] Adult  Hospitalist Group                                                                                          Hospitalist Progress Note  Sherrie Rey NP  Answering service: [phone] OR [phone] from in house phone        Date of Service:  2021  NAME:  Kranthi Hinton :  1950  MRN:  [id number]      Admission Summary:   Andrea Del Toro Jr. is a 66 y. o. male with hx of orthostatic hypotension, ESRD on HD, Blindness, cad who presents to hospital from LTCF for evaluation of left foot infection. Pt states he has had pain in his left foot for the last month. Unable to describe any additional changes due to his poor eye-sight. States he has otherwise been doing and feeling well. The patient denies any fever, chills, chest or abdominal pain, nausea, vomiting, cough, congestion, recent illness, palpitations, or dysuria. Remarkable vitals on ER Presentations: unremarkable  Labs Remarkable for: wbc 20.1, hgb 10.4, crp 18.90, esr 41,   ER Images: CT LLE: Suboptimal examination secondary to motion artifact. No definite bony  destruction is evident. Diffuse osteopenia. Diffuse soft tissue swelling in the  left foot. Interval history / Subjective:    Seen and examined patient during dialysis. States that he is ok . Discussed with dialysis RN- BP low during dialysis. Improving since dialysis stopped. States AKA scheduled for 2pm today.       Assessment & Plan:     Osteomyelitis,  Left Foot w/ Gagrene of LLE 3rd et 4th Digit (POA)   -Continue with vanc+zosyn  -Follow blood and wound cultures  -Hold eliquis, asa and plavix  - Vascular surgery following   - Podiatry following   - Keep NPO for OR today  - Plan for AKA today      ESRD on HD  -Nephro following for HD management   - Patient is TTS schedule      CAD  -Hold eliquis, asa and plavix     NIDDM II w/ Diabetic Neuropathy  -SSI + Hypoglycemic protocols  -Accuchecks q6 while npo     Orthostatic Hypotension  -Home midrodine and fludrocortisone    Code status: Full  DVT prophylaxis:     Care Plan discussed with: Patient/Family, Nurse and   Anticipated Disposition: TBD  Anticipated Discharge: Greater than 48 hours     Hospital Problems  Date Reviewed: 9/1/2021          Codes Class Noted POA    Gangrene of left foot ([address] ICD-10-CM: Y90  ICD-9-CM: 785.4  11/14/2021 Unknown                Review of Systems:   A comprehensive review of systems was negative except for that written in the HPI. Vital Signs:    Last 24hrs VS reviewed since prior progress note. Most recent are:  Visit Vitals  /69 (BP 1 Location: Left lower arm, BP Patient Position: At rest)   Pulse (!) 115   Temp 98.2 °F (36.8 °C)   Resp 16   SpO2 96%       No intake or output data in the 24 hours ending 11/16/21 0732     Physical Examination:             Constitutional:  No acute distress, cooperative, pleasant    ENT:  Oral mucosa moist, oropharynx benign. Resp:  CTA bilaterally. No wheezing/rhonchi/rales. No accessory muscle use   CV:  Regular rhythm, normal rate, no murmurs, gallops, rubs    GI:  Soft, non distended, non tender. normoactive bowel sounds    Musculoskeletal:  No edema, warm, 2+ pulses throughout    Neurologic:  Moves all extremities. AAOx3, CN II-XII reviewed     Psych:  Not anxious or agitated        Data Review:    Review and/or order of clinical lab test  Review and/or order of tests in the medicine section of [facility]      Labs:     Recent Labs     11/15/21  0556 11/13/21 2137   WBC 15.7* 20.1*   HGB 10.6* 10.4*   HCT 33.9* 34.0*    315     Recent Labs     11/15/21  0556 11/13/21 2137   * 135*   K 4.1 4.0    97   CO2 28 34*   BUN 38* 26*   CREA 5.36* 3.99*   GLU 60* 141*   CA 8.4* 8.2*     Recent Labs     11/13/21 2137   ALT 54   *   TBILI 0.3   TP 5.3*   ALB 1.3*   GLOB 4.0     Recent Labs     11/13/21 2137   INR 1.6*   PTP 16.7*      No results for input(s): FE, TIBC, PSAT, FERR in the last 72 hours.    No results found for: FOL, RBCF   No results for input(s): PH, PCO2, PO2 in the last 72 hours. No results for input(s): CPK, CKNDX, TROIQ in the last 72 hours.     No lab exists for component: CPKMB  Lab Results   Component Value Date/Time    Cholesterol, total 189 08/12/2011 10:06 AM    HDL Cholesterol 37 (L) 08/12/2011 10:06 AM    LDL, calculated 127 (H) 08/12/2011 10:06 AM    Triglyceride 125 08/12/2011 10:06 AM     Lab Results   Component Value Date/Time    Glucose (POC) 72 11/16/2021 06:51 AM    Glucose (POC) 74 11/15/2021 05:12 PM    Glucose (POC) 60 (L) 11/15/2021 04:32 PM    Glucose (POC) 99 11/15/2021 01:02 PM    Glucose (POC) 67 11/15/2021 12:16 PM     No results found for: COLOR, APPRN, SPGRU, REFSG, JOYA, PROTU, GLUCU, KETU, BILU, UROU, LAWRENCE, LEUKU, GLUKE, EPSU, BACTU, WBCU, RBCU, CASTS, UCRY      Medications Reviewed:     Current Facility-Administered Medications   Medication Dose Route Frequency    dextrose 5% infusion  50 mL/hr IntraVENous CONTINUOUS    traMADoL (ULTRAM) tablet 50 mg  50 mg Oral Q6H PRN    epoetin nabial-epbx (RETACRIT) injection 20,000 Units  20,000 Units SubCUTAneous Q TUE, THU & SAT    Vancomycin Pharmacy Dosing   Other Rx Dosing/Monitoring    atorvastatin (LIPITOR) tablet 20 mg  20 mg Oral QHS    fludrocortisone (FLORINEF) tablet 0.1 mg  0.1 mg Oral DAILY    midodrine (PROAMATINE) tablet 10 mg  10 mg Oral TIDPC    oxyCODONE IR (ROXICODONE) tablet 5 mg  5 mg Oral Q6H PRN    pantoprazole (PROTONIX) tablet 20 mg  20 mg Oral DAILY    pregabalin (LYRICA) capsule 25 mg  25 mg Oral DAILY    tiZANidine (ZANAFLEX) tablet 2 mg  2 mg Oral BID    piperacillin-tazobactam (ZOSYN) 3.375 g in 0.9% sodium chloride (MBP/ADV) 100 mL MBP  3.375 g IntraVENous Q12H    insulin regular (NOVOLIN R, HUMULIN R) injection   SubCUTAneous TIDAC    glucose chewable tablet 16 g  4 Tablet Oral PRN    dextrose (D50W) injection syrg 12.5-25 g  12.5-25 g IntraVENous PRN    glucagon (GLUCAGEN) injection 1 mg  1 mg IntraMUSCular PRN     ______________________________________________________________________  EXPECTED LENGTH OF STAY: 3d 21h  ACTUAL LENGTH OF STAY:          [address] [facility]

## 2021-11-16 NOTE — PROCEDURES
Hemodialysis / [phone]    Vitals Pre Post Assessment Pre Post   BP 98/64 95/54 LOC A/O x 3  Blind A/O x 3    116 Lungs Clear/ denies OB Clear   Resp 18 16 Cardiac HRR HRR   Temp 98.7 97.8 Skin WDI-having LLL amputated later today WDI   Weight THERESA-bed not zeroed THERESA Edema gen puffiness None noted   Tele status Monitored remotely remote Pain 9/10-having surg later--has had pain med 9/10 leg pain     Orders   Duration: Start: 0800 End: 1130 Total: 3.5 hrs   Dialyzer: Dialyzer/Set Up Inspection: aclear ([id number]/ [id number]) (11/16/21 0755)   K Bath: Dialysate K (mEq/L): 3 (11/16/21 0755)   Ca Bath: Dialysate CA (mEq/L): 2.5 (11/16/21 0755)   Na: Dialysate NA (mEq/L): 140 (11/16/21 0755)   Bicarb: Dialysate HCO3 (mEq/L): 35 (11/16/21 0755)   Target Fluid Removal: Goal/Amount of Fluid to Remove (mL): 1000 mL (11/16/21 0755)     Access   Type & Location: L thigh graft   Comments:  +T/B, no redness or drainage. Prepped w/ alcohol and CHG. Cannulated w/ 15g x 1\" needles x 2.  +flashes/ aspir/ NS flushes. Secured well. Labs   HBsAg (Antigen) / date:  Neg 4.23.21                                    HBsAb (Antibody) / date:  IMM  6.8.21   Source: portal   Obtained/Reviewed  Critical Results Called HGB   Date Value Ref Range Status   11/16/2021 9.3 (L) 12.1 - 17.0 g/dL Final     Potassium   Date Value Ref Range Status   11/16/2021 3.4 (L) 3.5 - 5.1 mmol/L Final     Calcium   Date Value Ref Range Status   11/16/2021 7.9 (L) 8.5 - 10.1 MG/DL Final     BUN   Date Value Ref Range Status   11/16/2021 46 (H) 6 - 20 MG/DL Final     Creatinine   Date Value Ref Range Status   11/16/2021 6.47 (H) 0.70 - 1.30 MG/DL Final        Meds Given   Name Dose Route        No HD meds ordered or given            Adequacy / Fluid    Total Liters Process: 74   Net Fluid Removed: 100 ml  (Low BP-limited UF ability)      Comments   Time Out Done:   (Time) 7809 mjb   Admitting Diagnosis: cellutitis of left leg   Consent obtained/signed: Informed Consent Verified: Yes (11/16/21 7153)   Machine / RO # Machine Number: [id number]/ [id number] (11/16/21 7709)   Primary Nurse Rpt Pre: Hetal Sung RN   Primary Nurse Rpt Post: Hetal Sung RN   Pt Education: Discussed cannulation and procedure   Care Plan: Continue to do HD txs as ordered   Pts outpatient clinic: [facility]  TTS      Tx Summary   Comments: Tolerated tx well. At end, blood in circuit returned w/ 300ml NS. Cannulas removed x 2. Pressure held to each site till no bleeding noted. Drsgs applied/ secured.     +T/B  Returned to 608 by hosp staff

## 2021-11-17 NOTE — OP NOTES
[address]  OPERATIVE REPORT    Name:  Catherine Acevedo  MR#:  [id number]  :  1950  ACCOUNT #:  [de-identified]  DATE OF SERVICE:  2021      PREOPERATIVE DIAGNOSIS:  Left foot gangrene. POSTOPERATIVE DIAGNOSIS:  Left foot gangrene. PROCEDURE PERFORMED:  Left above-knee amputation. SURGEON:  MD Delphine Simmons SA    ANESTHESIA:  General.    COMPLICATIONS:  None. SPECIMENS REMOVED:  Left above-knee segment. IMPLANTS:  None. ESTIMATED BLOOD LOSS:  200 mL. DRAINS:  None. INDICATIONS FOR PROCEDURE:  The patient is a 75-year-old debilitated nonambulatory gentleman who presented with wet gangrene of his left foot as well as a nonhealing wound of his calf. He has severe vascular disease and has a dialysis graft in his left thigh. For all of these reasons, it was felt that he would best be treated with left above-knee amputation. His foot was felt to be unsalvageable. Risks and benefits of the procedure were discussed preoperatively with the patient and the family. They voiced understanding and wished to proceed with amputation. PROCEDURE:  The patient was placed supine on the operating room table and general anesthesia was established. The left leg was prepped and draped in standard surgical fashion. A fishmouth incision was made in the skin above the knee and taken down through the skin and subcutaneous tissue and the fascia. The muscle was incised with the Bovie electrocautery. The femoral artery and vein were identified and divided and ligated with heavy silk ties. The femur was cut with a power saw. The remainder of tissue was excised using the Bovie electrocautery. Multiple small vessels were ligated using silk ties. The wound was thoroughly irrigated with antiseptic solution and the fascial edges were approximated using 2-0 Vicryl suture in interrupted fashion followed by staple closure of the skin.   Dry sterile dressings were then applied. The patient was then awoken and transferred to the recovery room in stable condition.         Deondre Davies MD [id number]/  D:  11/16/2021 22:29  T:  11/17/2021 3:06  JOB #:  [id number]

## 2021-11-17 NOTE — PROGRESS NOTES
[facility]   [address] [facility], [facility] [address], [facility] [place]note 2[place]  Phone: [phone]   [phone]       Nephrology Progress Note  Kenyon Led.     1950     [id number]  Date of Admission : 11/13/2021 11/17/21    CC: Follow up for ESRD       Assessment and Plan   ESRD- HD  - dialyzes TTS at 3 chopt   -  Next HD tomorrow   - has Lft thigh AVG for access    Left LE cellulitis   Left foot Wet gangrene   - s/p AKA 11/16    Anemia in CKD  Blood loss anemia   - check Hb   - continue SUNNY     Sec HPTH   - continue home binders     Chronic Hypotension   - continue Midodrine and Florinef      Interval History:  Seen and examined. Bleeding from AKA stump. Asked RN to do art stick for Hb and may need transfusion       Review of Systems: A comprehensive review of systems was negative except for that written in the HPI.     Current Medications:   Current Facility-Administered Medications   Medication Dose Route Frequency    traMADoL (ULTRAM) tablet 50 mg  50 mg Oral Q6H PRN    epoetin nabila-epbx (RETACRIT) injection 20,000 Units  20,000 Units SubCUTAneous Q TUE, THU & SAT    Vancomycin Pharmacy Dosing   Other Rx Dosing/Monitoring    atorvastatin (LIPITOR) tablet 20 mg  20 mg Oral QHS    fludrocortisone (FLORINEF) tablet 0.1 mg  0.1 mg Oral DAILY    midodrine (PROAMATINE) tablet 10 mg  10 mg Oral TIDPC    oxyCODONE IR (ROXICODONE) tablet 5 mg  5 mg Oral Q6H PRN    pantoprazole (PROTONIX) tablet 20 mg  20 mg Oral DAILY    pregabalin (LYRICA) capsule 25 mg  25 mg Oral DAILY    tiZANidine (ZANAFLEX) tablet 2 mg  2 mg Oral BID    piperacillin-tazobactam (ZOSYN) 3.375 g in 0.9% sodium chloride (MBP/ADV) 100 mL MBP  3.375 g IntraVENous Q12H    insulin regular (NOVOLIN R, HUMULIN R) injection   SubCUTAneous TIDAC    glucose chewable tablet 16 g  4 Tablet Oral PRN    dextrose (D50W) injection syrg 12.5-25 g  12.5-25 g IntraVENous PRN    glucagon (GLUCAGEN) injection 1 mg  1 mg IntraMUSCular PRN Allergies   Allergen Reactions    Adhesive Tape-Silicones Itching     Itchiness        Objective:  Vitals:    Vitals:    11/17/21 0608 11/17/21 0804 11/17/21 0840 11/17/21 1000   BP:   (!) 87/47    Pulse: 85 90 88 92   Resp:   17    Temp:   97.4 °F (36.3 °C)    SpO2:   99%    Weight:         Intake and Output:  No intake/output data recorded. 11/15 1901 - 11/17 0700  In: 250 [I.V.:250]  Out: 100     Physical Examination:  GEN: NAD,Blind  NECK- Supple, no mass  RESP: No wheezing, Clear b/l  CVS: S1,S2  RRR  NEURO: Normal speech, Non focal  EXT: Left AKA, bleeding stump  PSYCH:anxious  []    High complexity decision making was performed  []    Patient is at high-risk of decompensation with multiple organ involvement    Lab Data Personally Reviewed: I have reviewed all the pertinent labs, microbiology data and radiology studies during assessment.     Recent Labs     11/16/21  0800 11/15/21  0556    135*   K 3.4* 4.1   CL 99 100   CO2 30 28   GLU 75 60*   BUN 46* 38*   CREA 6.47* 5.36*   CA 7.9* 8.4*     Recent Labs     11/16/21  0800 11/15/21  0556   WBC 14.0* 15.7*   HGB 9.3* 10.6*   HCT 29.4* 33.9*    319     No results found for: SDES  Lab Results   Component Value Date/Time    Culture result: (A) 11/14/2021 01:54 AM     LIGHT STAPHYLOCOCCUS AUREUS OXACILLIN  SENSITIVITY TO FOLLOW    Culture result: HEAVY MIXED ENTERIC GRAM NEGATIVE RODS (A) 11/14/2021 01:54 AM    Culture result: NO GROWTH 3 DAYS 11/13/2021 11:55 PM     Recent Results (from the past 24 hour(s))   GLUCOSE, POC    Collection Time: 11/16/21 12:38 PM   Result Value Ref Range    Glucose (POC) 83 65 - 117 mg/dL    Performed by Vy Valera    COVID-19 RAPID TEST    Collection Time: 11/16/21  2:19 PM   Result Value Ref Range    Specimen source Nasopharyngeal      COVID-19 rapid test Not detected NOTD     GLUCOSE, POC    Collection Time: 11/16/21  4:45 PM   Result Value Ref Range    Glucose (POC) 122 (H) 65 - 117 mg/dL    Performed by Vy Valera    GLUCOSE, POC    Collection Time: 11/16/21  7:42 PM   Result Value Ref Range    Glucose (POC) 158 (H) 65 - 117 mg/dL    Performed by Roberto Rodriguez    GLUCOSE, POC    Collection Time: 11/16/21 10:52 PM   Result Value Ref Range    Glucose (POC) 141 (H) 65 - 117 mg/dL    Performed by Claudia Polanco    GLUCOSE, POC    Collection Time: 11/17/21  6:50 AM   Result Value Ref Range    Glucose (POC) 133 (H) 65 - 117 mg/dL    Performed by Kalpana Alaniz            Total time spent with patient:  xxx   min. Care Plan discussed with:  Patient     Family      RN      Consulting Physician [facility],         I have reviewed the flowsheets. Chart and Pertinent Notes have been reviewed. No change in PMH ,family and social history from Consult note.       Yvette Lauren MD

## 2021-11-17 NOTE — PROGRESS NOTES
JONATHAN:     RUR 17%     Disposition: [facility] LTC.  Referral sent via [facility] and accepted.     [facility] [place]     Follow up: PCP/Specialist     Primary contact: [address] [place] [address] Katt(Friend, Gabriela) [phone]

## 2021-11-17 NOTE — PERIOP NOTES
TRANSFER - OUT REPORT:    Verbal report given to Selam(name) on Ashlyn Paniagua.  being transferred to 60(unit) for routine post - op       Report consisted of patients Situation, Background, Assessment and   Recommendations(SBAR). Information from the following report(s) Procedure Summary, Intake/Output and MAR was reviewed with the receiving nurse. Lines:   Peripheral IV 11/14/21 Posterior; Right Forearm (Active)   Site Assessment Dry; Intact 11/17/21 0000   Phlebitis Assessment 0 11/17/21 0000   Infiltration Assessment 0 11/17/21 0000   Dressing Status Clean, dry, & intact 11/17/21 0000   Dressing Type Tape 11/17/21 0000   Hub Color/Line Status Blue; Capped 11/17/21 0000   Action Taken Open ports on tubing capped 11/16/21 1200   Alcohol Cap Used Yes 11/16/21 1200       Peripheral IV 11/16/21 Right Wrist (Active)   Site Assessment Clean, dry, & intact 11/17/21 0000   Phlebitis Assessment 0 11/17/21 0000   Infiltration Assessment 0 11/17/21 0000   Dressing Status Occlusive 11/17/21 0000   Dressing Type Transparent 11/17/21 0000   Hub Color/Line Status Pink; Infusing 11/17/21 0000        Opportunity for questions and clarification was provided.       Patient transported with:   [employer]

## 2021-11-17 NOTE — BRIEF OP NOTE
Brief Postoperative Note    Patient: Moncho Ruvalcaba.   YOB: 1950  MRN: [id number]    Date of Procedure: 11/16/2021     Pre-Op Diagnosis: gangrene    Post-Op Diagnosis: same    Procedure(s):  LEFT ABOVE KNEE AMPUTATION    Surgeon(s):  Pascual Rose MD    Surgical Assistant: Surg Asst-1: Omar Arauz    Anesthesia: General     Estimated Blood Loss (mL): 318BV    Complications: none    Specimens:   ID Type Source Tests Collected by Time Destination   1 : LEFT AKA Fresh Leg, Left  Pascual Rose MD 11/16/2021 2027 Pathology        Implants: none  Drains: none    Findings: well perfused tissue; no obvious infection    Electronically Signed by Al Medrano MD on 11/16/2021 at 10:23 PM

## 2021-11-17 NOTE — ANESTHESIA POSTPROCEDURE EVALUATION
Procedure(s):  LEFT ABOVE KNEE AMPUTATION. general    Anesthesia Post Evaluation      Multimodal analgesia: multimodal analgesia used between 6 hours prior to anesthesia start to PACU discharge  Patient location during evaluation: PACU  Patient participation: complete - patient participated  Level of consciousness: awake  Pain score: 2  Pain management: adequate  Airway patency: patent  Anesthetic complications: no  Cardiovascular status: acceptable  Respiratory status: acceptable  Hydration status: acceptable  Comments: I have evaluated the patient and meets criteria for discharge from PACU. Jameel Argueta MD  Post anesthesia nausea and vomiting:  controlled  Final Post Anesthesia Temperature Assessment:  Normothermia (36.0-37.5 degrees C)      INITIAL Post-op Vital signs:   Vitals Value Taken Time   BP 75/43 11/16/21 2301   Temp 36.4 °C (97.6 °F) 11/16/21 2232   Pulse 86 11/16/21 2306   Resp 16 11/16/21 2306   SpO2 100 % 11/16/21 2306   Vitals shown include unvalidated device data.

## 2021-11-17 NOTE — PROGRESS NOTES
[facility]                                                                                          Hospitalist Progress Note  German Rosado NP  Answering service: [phone] OR [phone] from in house phone        Date of Service:  2021  NAME:  Kenyon Pickett :  1950  MRN:  [id number]      Admission Summary:   Quyen Roy Jr. is a 66 y. o. male with hx of orthostatic hypotension, ESRD on HD, Blindness, cad who presents to hospital from LTCF for evaluation of left foot infection. Pt states he has had pain in his left foot for the last month. Unable to describe any additional changes due to his poor eye-sight. States he has otherwise been doing and feeling well. The patient denies any fever, chills, chest or abdominal pain, nausea, vomiting, cough, congestion, recent illness, palpitations, or dysuria. Remarkable vitals on ER Presentations: unremarkable  Labs Remarkable for: wbc 20.1, hgb 10.4, crp 18.90, esr 41,   ER Images: CT LLE: Suboptimal examination secondary to motion artifact. No definite bony  destruction is evident. Diffuse osteopenia. Diffuse soft tissue swelling in the  left foot. Interval history / Subjective:    Seen and examined patient on round this am.   Active bleeding noted from AKA site- Spoke with RN. Patient states that he is doing ok. No new complaints   Am labs pending     1600 Rapid response called for hypotension. Patient does not have IV access. Discussed with anesthesia. For central line placement. Patient to transfer to [facility]. 1730 - anesthesia unable to place central line. 9318 - Discussed with ICU NP. Blood pressure remains low. Patient may require pressor support. Transfusion ordered as patient still having bleeding from surgical site.    Assessment & Plan:     Osteomyelitis,  Left Foot w/ Gagrene of LLE 3rd et 4th Digit (POA)   - S/PLeft AKA ()  -Continue with vanc+zosyn  -Follow blood and wound cultures  -Hold eliquis, asa and plavix  - Vascular surgery following   - Podiatry following   - Keep NPO for OR today  - Plan for AKA today     Anemia  - Likely secondary to chronic disease and blood loss   - Monitor h&h and transfuse for hgb <7.0      ESRD on HD  -Nephro following for HD management   - Patient is TTS schedule      CAD  -Hold eliquis, asa and plavix     NIDDM II w/ Diabetic Neuropathy  -SSI + Hypoglycemic protocols  -Accuchecks q6 while npo     Orthostatic Hypotension  -Home midrodine and fludrocortisone     Code status: Full   DVT prophylaxis:     Care Plan discussed with: Patient/Family and Nurse  Anticipated Disposition: Return to facilty   Anticipated Discharge: 24 hours to 48 hours     Hospital Problems  Date Reviewed: 9/1/2021          Codes Class Noted POA    Gangrene of left foot ([facility] [address] ICD-10-CM: L67  ICD-9-CM: 785.4  11/14/2021 Unknown                Review of Systems:   A comprehensive review of systems was negative except for that written in the HPI. Vital Signs:    Last 24hrs VS reviewed since prior progress note. Most recent are:  Visit Vitals  BP (!) 96/56   Pulse (!) 103   Temp 97.4 °F (36.3 °C)   Resp 17   Wt 89 kg (196 lb 3.4 oz)   SpO2 99%   BMI 28.98 kg/m²         Intake/Output Summary (Last 24 hours) at 11/17/2021 1341  Last data filed at 11/16/2021 2225  Gross per 24 hour   Intake 250 ml   Output    Net 250 ml        Physical Examination:             Constitutional:  No acute distress, cooperative, pleasant, patient is blind    ENT:  Oral mucosa moist, oropharynx benign. Resp:  CTA bilaterally. No wheezing/rhonchi/rales. No accessory muscle use   CV:  Regular rhythm, normal rate, no murmurs, gallops, rubs    GI:  Soft, non distended, non tender. normoactive bowel sounds,    Musculoskeletal:  No edema, warm, 2+ pulses throughout. Left AKA     Neurologic:  Moves all extremities. AAOx3, CN II-XII reviewed     Psych:  Good insight, Not anxious nor agitated.        Data Review:    Review and/or order of clinical lab test  Review and/or order of tests in the medicine section of [facility]      Labs:     Recent Labs     11/16/21  0800 11/15/21  0556   WBC 14.0* 15.7*   HGB 9.3* 10.6*   HCT 29.4* 33.9*    319     Recent Labs     11/16/21  0800 11/15/21  0556    135*   K 3.4* 4.1   CL 99 100   CO2 30 28   BUN 46* 38*   CREA 6.47* 5.36*   GLU 75 60*   CA 7.9* 8.4*     No results for input(s): ALT, AP, TBIL, TBILI, TP, ALB, GLOB, GGT, AML, LPSE in the last 72 hours. No lab exists for component: SGOT, GPT, AMYP, HLPSE  No results for input(s): INR, PTP, APTT, INREXT in the last 72 hours. Recent Labs     11/16/21  0800   TIBC 89*   PSAT 38      No results found for: FOL, RBCF   No results for input(s): PH, PCO2, PO2 in the last 72 hours. No results for input(s): CPK, CKNDX, TROIQ in the last 72 hours.     No lab exists for component: CPKMB  Lab Results   Component Value Date/Time    Cholesterol, total 189 08/12/2011 10:06 AM    HDL Cholesterol 37 (L) 08/12/2011 10:06 AM    LDL, calculated 127 (H) 08/12/2011 10:06 AM    Triglyceride 125 08/12/2011 10:06 AM     Lab Results   Component Value Date/Time    Glucose (POC) 113 11/17/2021 12:33 PM    Glucose (POC) 133 (H) 11/17/2021 06:50 AM    Glucose (POC) 141 (H) 11/16/2021 10:52 PM    Glucose (POC) 158 (H) 11/16/2021 07:42 PM    Glucose (POC) 122 (H) 11/16/2021 04:45 PM     No results found for: COLOR, APPRN, SPGRU, REFSG, JOYA, PROTU, GLUCU, KETU, BILU, UROU, LAWRENCE, LEUKU, GLUKE, EPSU, BACTU, WBCU, RBCU, CASTS, UCRY      Medications Reviewed:     Current Facility-Administered Medications   Medication Dose Route Frequency    traMADoL (ULTRAM) tablet 50 mg  50 mg Oral Q6H PRN    epoetin nabila-epbx (RETACRIT) injection 20,000 Units  20,000 Units SubCUTAneous Q TUE, THU & SAT    Vancomycin Pharmacy Dosing   Other Rx Dosing/Monitoring    atorvastatin (LIPITOR) tablet 20 mg  20 mg Oral QHS    fludrocortisone (FLORINEF) tablet 0.1 mg  0.1 mg Oral DAILY  midodrine (PROAMATINE) tablet 10 mg  10 mg Oral TIDPC    oxyCODONE IR (ROXICODONE) tablet 5 mg  5 mg Oral Q6H PRN    pantoprazole (PROTONIX) tablet 20 mg  20 mg Oral DAILY    pregabalin (LYRICA) capsule 25 mg  25 mg Oral DAILY    tiZANidine (ZANAFLEX) tablet 2 mg  2 mg Oral BID    piperacillin-tazobactam (ZOSYN) 3.375 g in 0.9% sodium chloride (MBP/ADV) 100 mL MBP  3.375 g IntraVENous Q12H    insulin regular (NOVOLIN R, HUMULIN R) injection   SubCUTAneous TIDAC    glucose chewable tablet 16 g  4 Tablet Oral PRN    dextrose (D50W) injection syrg 12.5-25 g  12.5-25 g IntraVENous PRN    glucagon (GLUCAGEN) injection 1 mg  1 mg IntraMUSCular PRN     ______________________________________________________________________  EXPECTED LENGTH OF STAY: 4d 19h  ACTUAL LENGTH OF STAY:          3                 Peewee Aquino NP

## 2021-11-17 NOTE — PROCEDURES
Called by Rapid Response Team in assistance for IV access, secondary to hypotension and no access and history of a difficult IV stick. Patient normotensive and in NAD. Consent obtained, sterile prep and drape of right neck, diminutive RIJ unsuitable for cannulation. Sterile prep and drape of left neck, diminutive LIJ unsuitable for cannulation. Also diminutive LSC, attempt twice without return,  Discussed with bedside team.  L dorsum of hand 22G placed on first attempt, flushed 8CC without event. Pt NAD and Stable, no complaints.

## 2021-11-17 NOTE — ROUTINE PROCESS
Patient: Madina Morrow. MRN: [id number]  SSN: [id number]   YOB: 1950  Age: 66 y.o. Sex: male     Patient is status post Procedure(s):  LEFT ABOVE KNEE AMPUTATION.     Surgeon(s) and Role:     Jeanne Horne MD - Primary    Local/Dose/Irrigation:  NONE                  Peripheral IV 11/14/21 Posterior; Right Forearm (Active)   Site Assessment Clean, dry, & intact 11/16/21 1200   Phlebitis Assessment 0 11/16/21 1200   Infiltration Assessment 0 11/16/21 1200   Dressing Status Clean, dry, & intact 11/16/21 1200   Dressing Type Transparent 11/16/21 1200   Hub Color/Line Status Blue; Capped 11/16/21 1200   Action Taken Open ports on tubing capped 11/16/21 1200   Alcohol Cap Used Yes 11/16/21 1200       Peripheral IV 11/16/21 Right Wrist (Active)                           Dressing/Packing:  Incision 11/16/21 Leg Left-Dressing/Treatment:  (Adaptic, 4x4, ABD Pad, Medipore Tape) (11/16/21 2100)  Wound Pretibial Left;Mid Left lower leg scab 09/25/20-Dressing/Treatment: Open to air (11/14/21 0056)

## 2021-11-17 NOTE — PERIOP NOTES
1732 Receive care of patient from [facility] Case 60, medicated for pain . The patient left leg amputation site dressing dry and intact.

## 2021-11-17 NOTE — ANESTHESIA PREPROCEDURE EVALUATION
Relevant Problems   CARDIOVASCULAR   (+) Hypertension      RENAL FAILURE   (+) Renal failure      ENDOCRINE   (+) Diabetes mellitus (HCC)       Anesthetic History   No history of anesthetic complications            Review of Systems / Medical History  Patient summary reviewed, nursing notes reviewed and pertinent labs reviewed    Pulmonary  Within defined limits                 Neuro/Psych   Within defined limits           Cardiovascular  Within defined limits  Hypertension          CAD         GI/Hepatic/Renal  Within defined limits       Renal disease: ESRD and dialysis       Endo/Other  Within defined limits  Diabetes  Hypothyroidism  Arthritis     Other Findings              Physical Exam    Airway  Mallampati: II  TM Distance: > 6 cm  Neck ROM: normal range of motion   Mouth opening: Normal     Cardiovascular  Regular rate and rhythm,  S1 and S2 normal,  no murmur, click, rub, or gallop             Dental  No notable dental hx       Pulmonary  Breath sounds clear to auscultation               Abdominal  GI exam deferred       Other Findings            Anesthetic Plan    ASA: 4  Anesthesia type: general          Induction: Intravenous  Anesthetic plan and risks discussed with: Patient

## 2021-11-17 NOTE — PROGRESS NOTES
Vascular Surgery  --minimal complaints of pain  Patient Vitals for the past 12 hrs:   Temp Pulse Resp BP SpO2   11/17/21 0608  85      11/17/21 0420 97.4 °F (36.3 °C) 83 16 (!) 103/46 96 %   11/17/21 0404  80      11/17/21 0341 97.7 °F (36.5 °C) 81 16 (!) 115/51 96 %   11/17/21 0235 97.8 °F (36.6 °C) 79 16 (!) 98/52 97 %   11/17/21 0159  88      11/17/21 0112 97.6 °F (36.4 °C) 77 16 119/64 92 %   11/17/21 0030  87 16 (!) 107/56    11/17/21 0010  83 16 (!) 100/44    11/17/21 0000  86 24 (!) 120/56    11/16/21 2351 97.5 °F (36.4 °C) 85 17  92 %   11/16/21 2345  83 15 (!) 103/36    11/16/21 2308  84 20 (!) 81/39 100 %   11/16/21 2300  82 13     11/16/21 2255  82 13  100 %   11/16/21 2250  93 19  (!) 86 %   11/16/21 2245  90 20  92 %   11/16/21 2240  94 (!) 32 (!) 87/61 (!) 84 %   11/16/21 2235  84 21 (!) 85/73 95 %   11/16/21 2232 97.6 °F (36.4 °C) 85 20 (!) 93/32 98 %   11/16/21 2231  84 17 (!) 93/32 98 %   11/16/21 2226    (!) 101/34      Dressing with some saturation    Labs pending    Plan:  --keep dressing applied until tomorrow  --await labs  --PT/OT tomorrow

## 2021-11-17 NOTE — ROUTINE PROCESS
TRANSFER - IN REPORT:    Verbal bedside report received from [facility]) on Chrissy Moseley.  being received from [address] (unit) for change in patient condition(hypotension )      Report consisted of patients Situation, Background, Assessment and   Recommendations(SBAR). Information from the following report(s) SBAR, Kardex, ED Summary, Procedure Summary, Intake/Output, MAR, Accordion, Recent Results, Med Rec Status, Cardiac Rhythm NSR , Alarm Parameters , Pre Procedure Checklist, Procedure Verification, Quality Measures and Dual Neuro Assessment was reviewed with the receiving nurse. Opportunity for questions and clarification was provided. Assessment completed upon patients arrival to unit and care assumed. Primary Nurse Leonila Palomares RN and Adonis Arndt RN performed a dual skin assessment on this patient Impairment noted- see wound doc flow sheet  Faizan score is 12      Bedside, Verbal and Written shift change report given to July Moore RN  (oncoming nurse) by Albina Edwards RN  (offgoing nurse). Report included the following information SBAR, Kardex, ED Summary, Procedure Summary, Intake/Output, MAR, Accordion, Recent Results, Med Rec Status, Cardiac Rhythm NSR, Alarm Parameters , Pre Procedure Checklist, Procedure Verification, Quality Measures and Dual Neuro Assessment.

## 2021-11-17 NOTE — PROGRESS NOTES
1700 Anesthesia @ bedside. Unable to place central line, peripherial IV obtained via anesthesia in L hand.

## 2021-11-18 NOTE — PROGRESS NOTES
[facility]   [address] [facility], [facility] [address], [facility]  Phone: [phone]   [phone]       Nephrology Progress Note  Kelsi Valladares.     1950     [id number]  Date of Admission : 11/13/2021 11/18/21    CC: Follow up for ESRD       Assessment and Plan   ESRD- HD  - dialyzes TTS at 3 chopt   - HD today and transfuse 1 more unit with dialysis  -Midodrine and Florinef prior to HD  - has Lft thigh AVG for access    Left LE cellulitis   Left foot Wet gangrene   - s/p AKA 11/16    Anemia in CKD  Blood loss anemia   - check Hb   - continue SUNNY  -Transfused today    Sec HPTH   - continue home binders     Chronic Hypotension   - continue Midodrine and Florinef -increased doses     Interval History:  Seen and examined. Events noted. Profuse bleeding from a AKA stump leading to hypotension overnight and transferred to [facility]. Anesthesia was unable to place central line for access. IR scheduled to place a CVC later this morning. Hemodynamic improved. Repeat CBC pending    Review of Systems: A comprehensive review of systems was negative except for that written in the HPI.     Current Medications:   Current Facility-Administered Medications   Medication Dose Route Frequency    albumin human 25% (BUMINATE) solution 25 g  25 g IntraVENous Q6H    midodrine (PROAMATINE) tablet 15 mg  15 mg Oral Q8H    oxyCODONE IR (ROXICODONE) tablet 10 mg  10 mg Oral Q4H PRN    fentaNYL citrate (PF) injection 50 mcg  50 mcg IntraVENous Q3H PRN    0.9% sodium chloride infusion 250 mL  250 mL IntraVENous PRN    traMADoL (ULTRAM) tablet 50 mg  50 mg Oral Q6H PRN    epoetin nabila-epbx (RETACRIT) injection 20,000 Units  20,000 Units SubCUTAneous Q TUE, THU & SAT    Vancomycin Pharmacy Dosing   Other Rx Dosing/Monitoring    atorvastatin (LIPITOR) tablet 20 mg  20 mg Oral QHS    fludrocortisone (FLORINEF) tablet 0.1 mg  0.1 mg Oral DAILY    oxyCODONE IR (ROXICODONE) tablet 5 mg  5 mg Oral Q6H PRN    pantoprazole (PROTONIX) tablet 20 mg  20 mg Oral DAILY    pregabalin (LYRICA) capsule 25 mg  25 mg Oral DAILY    tiZANidine (ZANAFLEX) tablet 2 mg  2 mg Oral BID    piperacillin-tazobactam (ZOSYN) 3.375 g in 0.9% sodium chloride (MBP/ADV) 100 mL MBP  3.375 g IntraVENous Q12H    insulin regular (NOVOLIN R, HUMULIN R) injection   SubCUTAneous TIDAC    glucose chewable tablet 16 g  4 Tablet Oral PRN    dextrose (D50W) injection syrg 12.5-25 g  12.5-25 g IntraVENous PRN    glucagon (GLUCAGEN) injection 1 mg  1 mg IntraMUSCular PRN      Allergies   Allergen Reactions    Adhesive Tape-Silicones Itching     Itchiness        Objective:  Vitals:    Vitals:    11/18/21 0630 11/18/21 0700 11/18/21 0730 11/18/21 1020   BP: (!) 77/55 (!) 96/51 (!) 82/37    Pulse: 65 70 66 88   Resp: 17 19 12    Temp:  97.4 °F (36.3 °C)     SpO2: 100% 100% 100%    Weight:         Intake and Output:  No intake/output data recorded. 11/16 1901 - 11/18 0700  In: 634.2 [I.V.:250]  Out: -     Physical Examination:  GEN: NAD,Blind  NECK- Supple, no mass  RESP: No wheezing, Clear b/l  CVS: S1,S2  RRR  NEURO: Normal speech, Non focal  EXT: Left AKA, bleeding stump  PSYCH:anxious  []    High complexity decision making was performed  []    Patient is at high-risk of decompensation with multiple organ involvement    Lab Data Personally Reviewed: I have reviewed all the pertinent labs, microbiology data and radiology studies during assessment.     Recent Labs     11/17/21  1325 11/17/21  1311 11/16/21  0800    138 136   K 3.2* 3.6 3.4*    104 99   CO2 26 26 30   * 163* 75   BUN 23* 24* 46*   CREA 4.08* 4.04* 6.47*   CA 7.5* 7.3* 7.9*   PHOS  --  5.2*  --    ALB  --  1.0*  --      Recent Labs     11/17/21  1311 11/17/21  1308 11/16/21  0800   WBC 11.9*  --  14.0*   HGB 7.3* 7.3* 9.3*   HCT 23.9* 23.9* 29.4*     --  332     No results found for: SDES  Lab Results   Component Value Date/Time    Culture result: LIGHT STAPHYLOCOCCUS AUREUS (A) 11/14/2021 01:54 AM    Culture result: HEAVY MIXED ENTERIC GRAM NEGATIVE RODS (A) 11/14/2021 01:54 AM    Culture result: NO GROWTH 4 DAYS 11/13/2021 11:55 PM     Recent Results (from the past 24 hour(s))   GLUCOSE, POC    Collection Time: 11/17/21 12:33 PM   Result Value Ref Range    Glucose (POC) 113 65 - 117 mg/dL    Performed by Ebony Arriaza    HGB & HCT    Collection Time: 11/17/21  1:08 PM   Result Value Ref Range    HGB 7.3 (L) 12.1 - 17.0 g/dL    HCT 23.9 (L) 36.6 - 50.3 %   RENAL FUNCTION PANEL    Collection Time: 11/17/21  1:11 PM   Result Value Ref Range    Sodium 138 136 - 145 mmol/L    Potassium 3.6 3.5 - 5.1 mmol/L    Chloride 104 97 - 108 mmol/L    CO2 26 21 - 32 mmol/L    Anion gap 8 5 - 15 mmol/L    Glucose 163 (H) 65 - 100 mg/dL    BUN 24 (H) 6 - 20 MG/DL    Creatinine 4.04 (H) 0.70 - 1.30 MG/DL    BUN/Creatinine ratio 6 (L) 12 - 20      GFR est AA 18 (L) >60 ml/min/1.73m2    GFR est non-AA 15 (L) >60 ml/min/1.73m2    Calcium 7.3 (L) 8.5 - 10.1 MG/DL    Phosphorus 5.2 (H) 2.6 - 4.7 MG/DL    Albumin 1.0 (L) 3.5 - 5.0 g/dL   CBC W/O DIFF    Collection Time: 11/17/21  1:11 PM   Result Value Ref Range    WBC 11.9 (H) 4.1 - 11.1 K/uL    RBC 2.49 (L) 4.10 - 5.70 M/uL    HGB 7.3 (L) 12.1 - 17.0 g/dL    HCT 23.9 (L) 36.6 - 50.3 %    MCV 96.0 80.0 - 99.0 FL    MCH 29.3 26.0 - 34.0 PG    MCHC 30.5 30.0 - 36.5 g/dL    RDW 14.8 (H) 11.5 - 14.5 %    PLATELET 409 758 - 334 K/uL    MPV 9.5 8.9 - 12.9 FL    NRBC 0.2 (H) 0  WBC    ABSOLUTE NRBC 0.02 (H) 0.00 - 1.13 K/uL   METABOLIC PANEL, BASIC    Collection Time: 11/17/21  1:25 PM   Result Value Ref Range    Sodium 139 136 - 145 mmol/L    Potassium 3.2 (L) 3.5 - 5.1 mmol/L    Chloride 104 97 - 108 mmol/L    CO2 26 21 - 32 mmol/L    Anion gap 9 5 - 15 mmol/L    Glucose 162 (H) 65 - 100 mg/dL    BUN 23 (H) 6 - 20 MG/DL    Creatinine 4.08 (H) 0.70 - 1.30 MG/DL    BUN/Creatinine ratio 6 (L) 12 - 20      GFR est AA 18 (L) >60 ml/min/1.73m2    GFR est non-AA 14 (L) >60 ml/min/1.73m2    Calcium 7.5 (L) 8.5 - 10.1 MG/DL   GLUCOSE, POC    Collection Time: 11/17/21  4:17 PM   Result Value Ref Range    Glucose (POC) 178 (H) 65 - 117 mg/dL    Performed by Aarti Cobb, [employer]    Collection Time: 11/17/21  7:30 PM   Result Value Ref Range    HISTORY CHECKED? Historical check performed    GLUCOSE, POC    Collection Time: 11/18/21 10:33 AM   Result Value Ref Range    Glucose (POC) 91 65 - 117 mg/dL    Performed by DANYELLE DODD            Total time spent with patient:  xxx   min. Care Plan discussed with:  Patient     Family      RN      Consulting Physician [facility],         I have reviewed the flowsheets. Chart and Pertinent Notes have been reviewed. No change in PMH ,family and social history from Consult note.       Elaine Mayes MD

## 2021-11-18 NOTE — PROGRESS NOTES
Critical care follow-up note:  Seen and examined this gentleman this morning. Map of 75, not in distress, dressing on the stump is dry and no evidence of overt bleeding. The main issue right now is vascular access to administer medication and blood products as well as to obtain blood work. Patient has difficult IV access as evidenced with the presence of dialysis fistula in his thigh above the stump. Anesthesia staff has tried multiple time in different site to secure central line but without success. Consult to interventional radiology placed. I am worried about this gentleman ability to continue long-term dialysis outside of hospital setting. At this time I will place a hold on the transfer to the ICU, care will be presumed by the primary hospitalist.  I discussed this with him [] at bedside. Please do not hesitate to call us again for any question or concern or for significant change in clinical picture. no melena/no diarrhea/no vomiting

## 2021-11-18 NOTE — PROGRESS NOTES
[address] [facility]  Hospitalist Group                                                                                          Hospitalist Progress Note  Vani Durbin MD  Answering service: [phone] from in house phone        Date of Service:  2021  NAME:  Nirmal Pina. :  1950  MRN:  [id number]      Admission Summary:   Cy Huerta Jr. is a 66 y. o. male with hx of orthostatic hypotension, ESRD on HD, Blindness, cad who presents to hospital from [facility] for evaluation of left foot infection. Pt states he has had pain in his left foot for the last month. Unable to describe any additional changes due to his poor eye-sight. States he has otherwise been doing and feeling well. The patient denies any fever, chills, chest or abdominal pain, nausea, vomiting, cough, congestion, recent illness, palpitations, or dysuria. Remarkable vitals on ER Presentations: unremarkable  Labs Remarkable for: wbc 20.1, hgb 10.4, crp 18.90, esr 41,   ER Images: CT LLE: Suboptimal examination secondary to motion artifact. No definite bony  destruction is evident. Diffuse osteopenia. Diffuse soft tissue swelling in the  left foot. Interval history / Subjective:      I saw and examined him this morning. Mr Kofi Obrien is alert and oriented,lying comfortably. No complaints. His /93,spo2 100% on 2 l/min,HR 79. Discussed with intensivist Dr Mi Morales. .suggested to keep him here(no icu bed) besides his BP has improved  Patient does not have good access except small PIV,anesthesia could not place line. Assessment & Plan:     Osteomyelitis,  Left Foot w/ Gagrene of LLE 3rd et 4th Digit (POA)   - S/PLeft AKA ()  -Continue with vanc+zosyn  -Follow blood and wound cultures  -Hold eliquis, asa and plavix  - Vascular surgery following   - Podiatry following   - s/p AKA on . Acute post op blood loss anemia on top of chronic anemia of chronic disease  Baseline HB~10  --HB 7. 3. Just got a unit of blood. Monitor h&h and transfuse for hgb <7.0      ESRD on HD  -Nephro following for HD management   - Patient is TTS schedule      CAD  -Hold eliquis, asa and plavix,resume when okay by surgery     NIDDM II w/ Diabetic Neuropathy  -SSI + Hypoglycemic protocols  -Accuchecks q6 while npo     Orthostatic Hypotension likely worsened post due to acute blood loss. BP better this morning  -Home midrodine and fludrocortisone  -received a unit of blood. Getting Iv albumin. Difficult access. Anesthesia could not place a line. IR consulted.     Code status: Full   DVT prophylaxis:     Care Plan discussed with: Patient/Family and Nurse  Anticipated Disposition: Return to facilty   Anticipated Discharge: 24 hours to 48 hours     Hospital Problems  Date Reviewed: 9/1/2021          Codes Class Noted POA    Gangrene of left foot [facility]) ICD-10-CM: N79  ICD-9-CM: 785.4  11/14/2021 Unknown                Review of Systems:   A comprehensive review of systems was negative except for that written in the HPI. Vital Signs:    Last 24hrs VS reviewed since prior progress note. Most recent are:  Visit Vitals  BP (!) 96/51 (BP 1 Location: Right upper arm, BP Patient Position: At rest)   Pulse 70   Temp 97.4 °F (36.3 °C)   Resp 19   Wt 89 kg (196 lb 3.4 oz)   SpO2 100%   BMI 28.98 kg/m²         Intake/Output Summary (Last 24 hours) at 11/18/2021 0850  Last data filed at 11/18/2021 0240  Gross per 24 hour   Intake 384.2 ml   Output    Net 384.2 ml        Physical Examination:             Constitutional:  Alert and oriented x4. Blind   ENT:  Oral mucosa moist, oropharynx benign. Resp:  CTA bilaterally. No wheezing/rhonchi/rales. No accessory muscle use   CV:  Regular rhythm, normal rate, no murmurs, gallops, rubs    GI:  Soft, non distended, non tender. normoactive bowel sounds,    Musculoskeletal:  No edema, warm, 2+ pulses throughout. Left AKA,wound bandaged. Neurologic:  AAOx3, CN II-XII reviewed. Blind. Moves all extremities. Psych:  Good insight, Not anxious nor agitated. Data Review:    Review and/or order of clinical lab test  Review and/or order of tests in the medicine section of [facility]      Labs:     Recent Labs     11/17/21  1311 11/17/21  1308 11/16/21  0800 11/16/21  0800   WBC 11.9*  --   --  14.0*   HGB 7.3* 7.3*   < > 9.3*   HCT 23.9* 23.9*   < > 29.4*     --   --  332    < > = values in this interval not displayed. Recent Labs     11/17/21  1325 11/17/21  1311 11/16/21  0800    138 136   K 3.2* 3.6 3.4*    104 99   CO2 26 26 30   BUN 23* 24* 46*   CREA 4.08* 4.04* 6.47*   * 163* 75   CA 7.5* 7.3* 7.9*   PHOS  --  5.2*  --      Recent Labs     11/17/21  1311   ALB 1.0*     No results for input(s): INR, PTP, APTT, INREXT, INREXT in the last 72 hours. Recent Labs     11/16/21  0800   TIBC 89*   PSAT 38      No results found for: FOL, RBCF   No results for input(s): PH, PCO2, PO2 in the last 72 hours. No results for input(s): CPK, CKNDX, TROIQ in the last 72 hours.     No lab exists for component: CPKMB  Lab Results   Component Value Date/Time    Cholesterol, total 189 08/12/2011 10:06 AM    HDL Cholesterol 37 (L) 08/12/2011 10:06 AM    LDL, calculated 127 (H) 08/12/2011 10:06 AM    Triglyceride 125 08/12/2011 10:06 AM     Lab Results   Component Value Date/Time    Glucose (POC) 178 (H) 11/17/2021 04:17 PM    Glucose (POC) 113 11/17/2021 12:33 PM    Glucose (POC) 133 (H) 11/17/2021 06:50 AM    Glucose (POC) 141 (H) 11/16/2021 10:52 PM    Glucose (POC) 158 (H) 11/16/2021 07:42 PM     No results found for: COLOR, APPRN, SPGRU, REFSG, JOYA, PROTU, GLUCU, KETU, BILU, UROU, LAWRENCE, LEUKU, GLUKE, EPSU, BACTU, WBCU, RBCU, CASTS, UCRY      Medications Reviewed:     Current Facility-Administered Medications   Medication Dose Route Frequency    albumin human 25% (BUMINATE) solution 25 g  25 g IntraVENous Q6H    midodrine (PROAMATINE) tablet 15 mg  15 mg Oral Q8H    fentaNYL citrate (PF) injection 50 mcg  50 mcg IntraVENous Q3H PRN    oxyCODONE IR (ROXICODONE) tablet 5-10 mg  5-10 mg Oral Q4H PRN    0.9% sodium chloride infusion 250 mL  250 mL IntraVENous PRN    traMADoL (ULTRAM) tablet 50 mg  50 mg Oral Q6H PRN    epoetin nabila-epbx (RETACRIT) injection 20,000 Units  20,000 Units SubCUTAneous Q TUE, THU & SAT    Vancomycin Pharmacy Dosing   Other Rx Dosing/Monitoring    atorvastatin (LIPITOR) tablet 20 mg  20 mg Oral QHS    fludrocortisone (FLORINEF) tablet 0.1 mg  0.1 mg Oral DAILY    oxyCODONE IR (ROXICODONE) tablet 5 mg  5 mg Oral Q6H PRN    pantoprazole (PROTONIX) tablet 20 mg  20 mg Oral DAILY    pregabalin (LYRICA) capsule 25 mg  25 mg Oral DAILY    tiZANidine (ZANAFLEX) tablet 2 mg  2 mg Oral BID    piperacillin-tazobactam (ZOSYN) 3.375 g in 0.9% sodium chloride (MBP/ADV) 100 mL MBP  3.375 g IntraVENous Q12H    insulin regular (NOVOLIN R, HUMULIN R) injection   SubCUTAneous TIDAC    glucose chewable tablet 16 g  4 Tablet Oral PRN    dextrose (D50W) injection syrg 12.5-25 g  12.5-25 g IntraVENous PRN    glucagon (GLUCAGEN) injection 1 mg  1 mg IntraMUSCular PRN     ______________________________________________________________________  EXPECTED LENGTH OF STAY: 10d 9h  ACTUAL LENGTH OF STAY:          4                 Rankin Lefort, MD

## 2021-11-18 NOTE — PROGRESS NOTES
ICU NP attempted to draw H/H, unsuccessful. Per NP, go ahead and start the unit of blood ordered. BP continuous to run soft. Pt is alert. Will closely monitor pt.

## 2021-11-18 NOTE — PROGRESS NOTES
[facility]    ICU TEAM Progress Note    Name: Nadine Cuello. : 1950   MRN: [id number]   Date: 2021      Reason for ICU Admission:     HPI:   66year old  male with PM hx of orthostatic hypotension, ESRD on HD, Blindness, CAD who presents to hospital from [facility] for evaluation of left foot infection. He underwent Left AKA on 21 with EBL of 200ml. Today while on the inpatient unit rapid response called for  Hypotension. Patient did not have access and is deemed to be a difficult access. Anesthesia attempted and found both right and left IJ unsuitable, Left SC was attempted twice without blood return. On assessment patient is awake, opens eyes and is oriented x 4. BP in the 67Y systolic with MAPs 78'N. Patient currently have 22G PIV placed earlier by anesthesia. Albumin and 1 unit PRBC ordered. Patient denies any chest pain or pressure, headaches, nausea or vomiting, fevers or chills, hematemesis, hematochezia or melena. Subjective:     POD:  2 Days Post-Op    S/P:   Procedure(s):  LEFT ABOVE KNEE AMPUTATION    Assessment:     ICU Problems:    1. Acute Blood Loss Anemia S/p Left AKA   2. Osteomyelitis , Left foot W/Gangrene of LLE 3rd and 4th  S/P Left AKA ( )  3. ESRD on HD   4. CAD  5. NIDDM 2 With Diabetic Neuropathy  6. Orthostatic Hypotension   7.  Difficult IV Access     ICU Comprehensive Plan of Care:     Plans for this Shift:     1) Heme: Acute on Chronic Blood Loss Anemia   - Type and crossmatch  - Transfuse 1 unit PRBC to keep Hgb >8.0  - Serial H & H     2) ID: Osteomyelitis , Left foot W/Gangrene of LLE 3rd and 4th  S/P Left AKA ( )  - Continue IV Vanc and Zosyn   - Currently receiving PRBC and Albumin  - May need vasopressors, IR to get access in am  - Can start low dose phenylephrine if able to get at least a 20G IV   - Follow blood and wound cultures   - Vascular surgery following  - Podiatry following    3) Renal: ESRD on HD, on Tues/Thur/Sat    4) CV: Coronary Artery Disease , Orthostatic Hypotension   - Holding Plavix, ASA and Eliquis in setting of Acute blood loss  - Continue Midodrine and fludrocortisone    5) Endocrine: NIDDM 2 W/ Diabetic Neuropathy  - SSI Q 6 hours     6) Pulmonary :  - No acute issues, on room air       Prophylaxis:  - Protonix daily  - No SCDs or chemical prophylaxis d/t above       Active Problem List:     Problem List  Date Reviewed: 9/1/2021          Codes Class    Gangrene of left foot ([facility] ICD-10-CM: A50  ICD-9-CM: 785.4         Status post placement of implantable loop recorder ICD-10-CM: Z95.818  ICD-9-CM: V45.09     Overview Signed 9/1/2021  8:35 AM by Elizabeth Enriquez MD     9/1/2021 [employer] ILR             Hypertension ICD-10-CM: T02  ICD-9-CM: 401.9         Renal failure ICD-10-CM: N19  ICD-9-CM: 755         Diabetes mellitus ([facility] ICD-10-CM: E11.9  ICD-9-CM: 250.00               Past Medical History:      has a past medical history of Adverse effect of anesthesia, Arthritis, CAD (coronary artery disease), Chronic kidney disease, Diabetes ([facility] Heart failure ([facility] (2009), Hyperlipidemia, Hypertension, Legal blindness, Orthostatic hypotension, SOB (shortness of breath), Thyroid disease, and Vitamin D deficiency. Past Surgical History:      has a past surgical history that includes hx appendectomy; vascular surgery procedure unlist; hx heent; hx vascular access; and pr abdomen surgery proc unlisted (11/01/2018). Home Medications:     Prior to Admission medications    Medication Sig Start Date End Date Taking? Authorizing Provider   fludrocortisone (FLORINEF) 0.1 mg tablet Take 0.1 mg by mouth daily. 2 tablets by mouth in the morning for low blood pressure. Yes Other, MD Katherine   loperamide (IMODIUM) 2 mg capsule Take  by mouth. Yes Other, MD Katherine   pregabalin (LYRICA) 25 mg capsule Take 25 mg by mouth. 1 capsule by mouth one time a day for neuropathy.    Yes Other, MD Katherine   polyethylene glycol (Miralax) 17 gram/dose powder Take 17 g by mouth daily. As needed for constipation. Yes Other, MD Katherine   oxyCODONE IR (ROXICODONE) 5 mg immediate release tablet Take 2.5 mg by mouth every four (4) hours as needed for Pain. Indications: pain   Yes Glen, MD Katherine   pantoprazole (Protonix) 20 mg tablet Take 20 mg by mouth daily. Give 30/60minutes before food. Yes Other, MD Katherine   ergocalciferol, vitamin D2, (VITAMIN D2 PO) Take 50 mcg by mouth. 1 tablet by mouth in evening for supplement. Yes Other, MD Katherine   clopidogreL (PLAVIX) 75 mg tab Take  by mouth. Yes Provider, Historical   mineral oil-isopropyl myristat (EUCERIN) lotion Apply  to affected area as needed for Dry Skin. Yes Provider, Historical   glipiZIDE SR (Glucotrol XL) 5 mg CR tablet Take 5 mg by mouth daily. Yes Provider, Historical   tiZANidine (ZANAFLEX) 2 mg tablet Take 2 mg by mouth two (2) times a day. Yes Provider, Historical   midodrine (PROAMITINE) 10 mg tablet Take 10 mg by mouth three (3) times daily. Indications: a feeling of dizziness upon standing due to a drop in blood pressure   Yes Provider, Historical   loperamide (IMODIUM) 2 mg capsule Take 2 mg by mouth every four (4) hours as needed for Diarrhea. Yes Provider, Historical   POTASSIUM CHLORIDE PO Take 10 mEq by mouth. Potassium Chloride ER Tablet Extended Release 10 MEQ. Give 2 tablet by mouth in the afternoon for hypokalemia. Other, Phys, MD   witch hazeL (Preparation H, witch hazel,) 20 % padm by Apply Externally route. Apply to rectal areas topically every 6hrs needed for rectal discomfort. Other, MD Katherine   sennosides (Senna) 8.6 mg cap Take  by mouth. 8.6-50mg     Give 2 tablet by mouth at bedside for constipation. Glen, MD Katherine   diclofenac (Voltaren) 1 % gel Apply 2 g to affected area four (4) times daily.  Apply to bilateral shoulders topically 4 times a day for arthritic pain    Other, MD Katherine   apixaban (ELIQUIS) 5 mg tablet Take 5 mg by mouth two (2) times a day.    Provider, Historical   pollen extracts (PROSTAT PO) Take 30 mL by mouth daily. Provider, Historical   ascorbic acid, vitamin C, (Vitamin C) 500 mg tablet Take 500 mg by mouth daily. Provider, Historical   atorvastatin (LIPITOR) 10 mg tablet Take 20 mg by mouth nightly. Provider, Historical   ondansetron hcl (ZOFRAN) 4 mg tablet Take 4 mg by mouth every six (6) hours as needed for Nausea. Provider, Historical   phenyleph/pramoxin/glycr/w.pet (PREPARATION H MAXIMUM STRENGTH RE) Insert  into rectum. PREPARATION H CREAM 5-14/4%. INSERT 1 APPLICATION RECTALLY EVERY 4 HRS AS NEEDED FOR HEMORRHOIDS. Provider, Historical   TUCKS, WITCH HAZEL, EX by Apply Externally route as needed. APPLY  TO RECTAL AREA TOPICALLY AY EVERY 8 HRS AS NEEDED FOR HEMORROIDS    Provider, Historical   acetaminophen (TYLENOL EXTRA STRENGTH) 500 mg tablet Take 1,000 mg by mouth every six (6) hours as needed for Pain. Provider, Historical       Allergies/Social/Family History:      Allergies   Allergen Reactions    Adhesive Tape-Silicones Itching     Itchiness       Social History     Tobacco Use    Smoking status: Former Smoker     Years: 2.00    Smokeless tobacco: Never Used   Substance Use Topics    Alcohol use: No      Family History   Problem Relation Age of Onset    Hypertension Mother     Diabetes Mother     Hypertension Father     Diabetes Father     Hypertension Sister        Review of Systems:   ROS negative except what's mentioned in HPI     Objective:   Vital Signs:  Visit Vitals  BP (!) 84/36 (BP 1 Location: Right upper arm, BP Patient Position: At rest)   Pulse 64   Temp 97.6 °F (36.4 °C)   Resp 11   Wt 89 kg (196 lb 3.4 oz)   SpO2 100%   BMI 28.98 kg/m²    O2 Flow Rate (L/min): 2 l/min O2 Device: Nasal cannula Temp (24hrs), Av.6 °F (36.4 °C), Min:97.4 °F (36.3 °C), Max:98 °F (36.7 °C)           Intake/Output:   No intake or output data in the 24 hours ending 21    Physical Exam:  General: Male patient lying in bed resting in no acute distress   HEENT: Head normocephalic, atraumatic , bilateral blindness, oral mucosa pink and moist  Pulmonary: Lungs clear with diminish bases, no wheezes  Cv: Heart rate and rhythm regular, could not hear M/R/G  Abdomen: Obese, rounded , + BS   Extremities: Left AKA with OR dressing in sanguinous drainage , RLE with brawny discoloration, weak LLE pulse  Neuro: Opens eyes, awake, alert, oriented x 4, generalized weakness       LABS AND  DATA: Personally reviewed  Recent Labs     11/17/21  1311 11/17/21  1308 11/16/21  0800 11/16/21  0800   WBC 11.9*  --   --  14.0*   HGB 7.3* 7.3*   < > 9.3*   HCT 23.9* 23.9*   < > 29.4*     --   --  332    < > = values in this interval not displayed. Recent Labs     11/17/21  1325 11/17/21  1311    138   K 3.2* 3.6    104   CO2 26 26   BUN 23* 24*   CREA 4.08* 4.04*   * 163*   CA 7.5* 7.3*   PHOS  --  5.2*     Recent Labs     11/17/21  1311   ALB 1.0*     No results for input(s): INR, PTP, APTT, INREXT in the last 72 hours. No results for input(s): PHI, PCO2I, PO2I, FIO2I in the last 72 hours. No results for input(s): CPK, CKMB, TROIQ, BNPP in the last 72 hours. Hemodynamics:   PAP:   CO:     Wedge:   CI:     CVP:    SVR:       PVR:       Ventilator Settings:  Mode Rate Tidal Volume Pressure FiO2 PEEP                    Peak airway pressure:      Minute ventilation:          MEDS: Reviewed    Imaging:  CXR Results  (Last 48 hours)    None          CT Results  (Last 48 hours)    None          Multidisciplinary Rounds Completed:  Pending    ABCDEF Bundle/Checklist Completed:  Yes    SPECIAL EQUIPMENT  None    DISPOSITION  Transfer to ICU     CRITICAL CARE CONSULTANT NOTE  I had a face to face encounter with the patient, reviewed and interpreted patient data including clinical events, labs, images, vital signs, I/O's, and examined patient.   I have discussed the case and the plan and management of the patient's care with the consulting services, the bedside nurses and the respiratory therapist.      NOTE OF PERSONAL INVOLVEMENT IN CARE   This patient has a high probability of imminent, clinically significant deterioration, which requires the highest level of preparedness to intervene urgently. I participated in the decision-making and personally managed or directed the management of the following life and organ supporting interventions that required my frequent assessment to treat or prevent imminent deterioration. I personally spent 52  minutes of critical care time. This is time spent at this critically ill patient's bedside actively involved in patient care as well as the coordination of care and discussions with the patient's family. This does not include any procedural time which has been billed separately.     Owen Campbell FNP, AG-ACNP-BC     Critical Care Medicine  [facility]

## 2021-11-18 NOTE — DIALYSIS
[facility]                         [phone]  Vitals Pre Post Assessment Pre Post   BP 90/47 114/51 LOC A&Ox4 A&Ox4   HR 73bpm 85bpm Pain denies denies   Temp 97.5 97.6 Lungs Diminishedx5, even & unlabored Diminished but clear X5, even and unlabored   Resp 16/min 14/min Cardiac Reg rate & rhythm Reg rate & rthythm   Weight 88Kg 87.8  Kg Skin Warm & dry Warm & dry      Edema All extremities; dependent noted All extremities; dependent noted     Orders   Duration: Start: 1415 End: 1745 Total: 3.5hrs   Dialyzer: Gambro Revaclear   K Bath: 3   Ca Bath: 2.5   Na / Bicarb: 140/35 bicarb   Target Fluid Removal: 1 L as tolerated     Access   Type: AV fistula   Location: Lt thigh   Comments: Bruit and thrill positive and present pre-tx prepped per protocol and easily cannulated with 2-15G AV fistula needles with good flows upon aspiration. Fistula site exhibits no redness, no edema, no discharge, no palor, no odor, nor heat. Post-tx all possible blood returned via full rinseback. Both needles removed without excessive bleeding (A-15min, V-15min). Bruit and thrill positive and present post-tx. Labs   Obtained/Reviewed  Critical Results Called Hep B, H&H       Meds Given   Name Dose Route   Albumin 25% 100ml IV               Total Liters Process: 82.5   Net Fluid Removed: 200 mL      Comments   tx progressed well and without incident. Pre-tx time-out performed; HD and blood consents verified. Pt received 1unit PRBCs IV s adverse nor allergic rxn. Writer left pt in no new acute distress and denying complaints with stable vss WNL, bed in low position with side rails up x 3, wheels locked x4, and call bell within reach. Reported off to Corinne SALDAÑA. Von Santiago RN.

## 2021-11-19 NOTE — PROGRESS NOTES
Bedside shift change report given to PIPER Ervin (oncoming nurse) by Vicki Grider (offgoing nurse). Report included the following information SBAR, Kardex, ED Summary, Procedure Summary, Intake/Output, Recent Results and Med Rec Status.

## 2021-11-19 NOTE — PROGRESS NOTES
Problem: Pressure Injury - Risk of  Goal: *Prevention of pressure injury  Description: Document Faizan Scale and appropriate interventions in the flowsheet. Outcome: Progressing Towards Goal  Note: Pressure Injury Interventions:  Sensory Interventions: Assess changes in LOC, Keep linens dry and wrinkle-free, Maintain/enhance activity level, Minimize linen layers    Moisture Interventions: Absorbent underpads    Activity Interventions: Pressure redistribution bed/mattress(bed type), Increase time out of bed    Mobility Interventions: HOB 30 degrees or less, Pressure redistribution bed/mattress (bed type)    Nutrition Interventions: Document food/fluid/supplement intake    Friction and Shear Interventions: Apply protective barrier, creams and emollients, Foam dressings/transparent film/skin sealants, Minimize layers                Problem: Falls - Risk of  Goal: *Absence of Falls  Description: Document Victor M Fall Risk and appropriate interventions in the flowsheet.   Outcome: Progressing Towards Goal  Note: Fall Risk Interventions:  Mobility Interventions: Communicate number of staff needed for ambulation/transfer              Elimination Interventions: Call light in reach, Patient to call for help with toileting needs, Toileting schedule/hourly rounds

## 2021-11-19 NOTE — PROGRESS NOTES
Vascular Surgery  --wound examined-->bleeding appears to have resolved   --dressings applied  --ok to be OOB; will order PT for tomorrow  --will check back Monday

## 2021-11-19 NOTE — PROGRESS NOTES
Upon assessment, this RN noticed pt is actively bleeding from a small site between the stitches. NP called to assess pt, new order for opticell. Opticell applied, redressed wound. Will continue to closely monitor.

## 2021-11-19 NOTE — PROGRESS NOTES
TRANSFER - OUT REPORT:    Verbal report given to [facility], RN(name) on Fabi Madera.  being transferred to [facility]) for routine progression of care       Report consisted of patients Situation, Background, Assessment and   Recommendations(SBAR). Information from the following report(s) SBAR, Kardex, Procedure Summary, Intake/Output, MAR and Recent Results was reviewed with the receiving nurse. Lines:   Peripheral IV 11/17/21 Left; Posterior Hand (Active)   Site Assessment Clean, dry, & intact 11/19/21 1200   Phlebitis Assessment 0 11/19/21 1200   Infiltration Assessment 0 11/19/21 1200   Dressing Status Clean, dry, & intact 11/19/21 1200   Dressing Type Transparent 11/19/21 1200   Hub Color/Line Status Infusing 11/19/21 1200   Action Taken Open ports on tubing capped 11/19/21 1200   Alcohol Cap Used Yes 11/19/21 1200        Opportunity for questions and clarification was provided.       Patient transported with:   Tech  Pt on 2L O2 nc

## 2021-11-19 NOTE — PROGRESS NOTES
[facility]                                                                                          Hospitalist Progress Note  Ila Petty MD  Answering service: [phone] from in house phone        Date of Service:  2021  NAME:  Minna Kirk. :  1950  MRN:  [id number]      Admission Summary:   Frankie Love Jr. is a 66 y. o. male with hx of orthostatic hypotension, ESRD on HD, Blindness, cad who presents to hospital from LTCF for evaluation of left foot infection. Pt states he has had pain in his left foot for the last month. Unable to describe any additional changes due to his poor eye-sight. States he has otherwise been doing and feeling well. The patient denies any fever, chills, chest or abdominal pain, nausea, vomiting, cough, congestion, recent illness, palpitations, or dysuria. Remarkable vitals on ER Presentations: unremarkable  Labs Remarkable for: wbc 20.1, hgb 10.4, crp 18.90, esr 41,   ER Images: CT LLE: Suboptimal examination secondary to motion artifact. No definite bony  destruction is evident. Diffuse osteopenia. Diffuse soft tissue swelling in the  left foot. Interval history / Subjective:      I saw and examined him this morning. Chart reviewed. Over night events noted. Examined patient. Pt complained of pain from his bottom. No other complaints. Assessment & Plan:     Osteomyelitis,  Left Foot w/ Gagrene of LLE 3rd et 4th Digit (POA)   - S/PLeft AKA ()  - Wound culture prior to AKA grew light mssa and heavy mixed enetric GNR. Blood cx negative. D/cd vancomycin. Continue zosyn  -Hold eliquis, asa and plavix  - Vascular surgery following       Bleeding from the left AKA site  --Pressure dressing in place. Vascular surgery made aware. Acute post op blood loss anemia on top of chronic anemia of chronic disease  Baseline HB~10  --HB 8. 1. Transfuse for Hb<8      ESRD on HD  -Nephro following for HD management   - Patient is TTS schedule      CAD  -Hold eliquis, asa and plavix,resume when okay by surgery     NIDDM II w/ Diabetic Neuropathy  -SSI + Hypoglycemic protocols  -Accuchecks q6 while npo     Orthostatic Hypotension likely worsened post due to acute blood loss. BP better this morning  -Home midrodine and fludrocortisone  -received a unit of blood. albumin as needed  -BP stable,SBp>100    Difficult access. Anesthesia could not place a line. IR consulted,not done yet. -Patient only with a small PIV. Nursing asking RT for blood. We need a reliable access for medication administration ,lab work besides he may need iv vasopressor support if he becomes hypotensive. I called and spoke with IR department     Code status: Full   DVT prophylaxis:     Care Plan discussed with: Patient/Family and Nurse  Anticipated Disposition: Return to facilty   Anticipated Discharge: 24 hours to 48 hours     Hospital Problems  Date Reviewed: 9/1/2021          Codes Class Noted POA    Gangrene of left foot [facility]) ICD-10-CM: A39  ICD-9-CM: 785.4  11/14/2021 Unknown                Review of Systems:   A comprehensive review of systems was negative except for that written in the HPI. Vital Signs:    Last 24hrs VS reviewed since prior progress note. Most recent are:  Visit Vitals  BP (!) 116/50 (BP 1 Location: Right upper arm, BP Patient Position: At rest)   Pulse 77   Temp (!) 96.7 °F (35.9 °C)   Resp 14   Wt 89 kg (196 lb 3.4 oz)   SpO2 100%   BMI 28.98 kg/m²         Intake/Output Summary (Last 24 hours) at 11/19/2021 5686  Last data filed at 11/18/2021 1745  Gross per 24 hour   Intake 300 ml   Output 3000 ml   Net -2700 ml        Physical Examination:             Constitutional:  Alert and oriented x4. Blind   ENT:  Oral mucosa moist, oropharynx benign. Resp:  CTA bilaterally. No wheezing/rhonchi/rales. No accessory muscle use   CV:  Regular rhythm, normal rate, no murmurs, gallops, rubs    GI:  Soft, non distended, non tender.  normoactive bowel sounds, Musculoskeletal:  No edema, warm, 2+ pulses throughout. Left AKA,dressing soaked from under. Neurologic:  AAOx3, CN II-XII reviewed. Blind. Moves all extremities. Psych:  Good insight, Not anxious nor agitated. Data Review:    Review and/or order of clinical lab test  Review and/or order of tests in the medicine section of [facility]      Labs:     Recent Labs     11/19/21  0816 11/18/21  1516   WBC 11.0 8.6   HGB 8.1* 6.6*   HCT 26.1* 21.0*    224     Recent Labs     11/19/21  0816 11/17/21  1325 11/17/21  1311    139 138   K 2.9* 3.2* 3.6    104 104   CO2 29 26 26   BUN 12 23* 24*   CREA 3.06* 4.08* 4.04*   * 162* 163*   CA 8.0* 7.5* 7.3*   PHOS 3.7  --  5.2*     Recent Labs     11/19/21  0816 11/17/21  1311   ALB 2.2* 1.0*     No results for input(s): INR, PTP, APTT, INREXT, INREXT in the last 72 hours. No results for input(s): FE, TIBC, PSAT, FERR in the last 72 hours. No results found for: FOL, RBCF   No results for input(s): PH, PCO2, PO2 in the last 72 hours. No results for input(s): CPK, CKNDX, TROIQ in the last 72 hours.     No lab exists for component: CPKMB  Lab Results   Component Value Date/Time    Cholesterol, total 189 08/12/2011 10:06 AM    HDL Cholesterol 37 (L) 08/12/2011 10:06 AM    LDL, calculated 127 (H) 08/12/2011 10:06 AM    Triglyceride 125 08/12/2011 10:06 AM     Lab Results   Component Value Date/Time    Glucose (POC) 121 (H) 11/19/2021 08:31 AM    Glucose (POC) 125 (H) 11/18/2021 08:43 PM    Glucose (POC) 110 11/18/2021 05:05 PM    Glucose (POC) 98 11/18/2021 12:13 PM    Glucose (POC) 91 11/18/2021 10:33 AM     No results found for: COLOR, APPRN, SPGRU, REFSG, JOYA, PROTU, GLUCU, KETU, BILU, UROU, LAWRENCE, LEUKU, GLUKE, EPSU, BACTU, WBCU, RBCU, CASTS, UCRY      Medications Reviewed:     Current Facility-Administered Medications   Medication Dose Route Frequency    oxyCODONE IR (ROXICODONE) tablet 10 mg  10 mg Oral Q4H PRN    0.9% sodium chloride infusion 250 mL  250 mL IntraVENous PRN    midodrine (PROAMATINE) tablet 20 mg  20 mg Oral Q8H    fludrocortisone (FLORINEF) tablet 0.1 mg  0.1 mg Oral BID    0.9% sodium chloride infusion 250 mL  250 mL IntraVENous PRN    gelatin sponge,absorb-porcine (GELFOAM) 100 sponge 1 Each  1 Each Topical PRN    fentaNYL citrate (PF) injection 50 mcg  50 mcg IntraVENous Q3H PRN    0.9% sodium chloride infusion 250 mL  250 mL IntraVENous PRN    traMADoL (ULTRAM) tablet 50 mg  50 mg Oral Q6H PRN    epoetin nabila-epbx (RETACRIT) injection 20,000 Units  20,000 Units SubCUTAneous Q TUE, THU & SAT    atorvastatin (LIPITOR) tablet 20 mg  20 mg Oral QHS    pantoprazole (PROTONIX) tablet 20 mg  20 mg Oral DAILY    pregabalin (LYRICA) capsule 25 mg  25 mg Oral DAILY    tiZANidine (ZANAFLEX) tablet 2 mg  2 mg Oral BID    piperacillin-tazobactam (ZOSYN) 3.375 g in 0.9% sodium chloride (MBP/ADV) 100 mL MBP  3.375 g IntraVENous Q12H    insulin regular (NOVOLIN R, HUMULIN R) injection   SubCUTAneous TIDAC    glucose chewable tablet 16 g  4 Tablet Oral PRN    dextrose (D50W) injection syrg 12.5-25 g  12.5-25 g IntraVENous PRN    glucagon (GLUCAGEN) injection 1 mg  1 mg IntraMUSCular PRN     ______________________________________________________________________  EXPECTED LENGTH OF STAY: 10d 9h  ACTUAL LENGTH OF STAY:          5                 Tip Engel MD

## 2021-11-19 NOTE — H&P
Interventional and Vascular Radiology History and Physical    Patient: Kelsi Valladares. 66 y.o. male       Chief Complaint: Osteomyelitis     History of Present Illness: 66year old male with ESRD and osteomyelitis presents for central venous catheter placement. He has a history of chronic venous disease, with possible neck/chest stenoses and failed prior bilateral upper extremity AV accesses. Currently has a left lower extremity dialysis AV access.      History:    Past Medical History:   Diagnosis Date    Adverse effect of anesthesia     \"at [facility]'s I had a little problem with my lungs, like they tried to colapse, but they got me squared away\"    Arthritis     CAD (coronary artery disease)     Chronic kidney disease     ESRD, [facility] [place]/T,Thurs,Sat    Diabetes ([address]     type 2    Heart failure ([facility] [address] 2009    Hyperlipidemia     Hypertension     Legal blindness     Orthostatic hypotension     SOB (shortness of breath)     O2 AT 2LPM PRN    Thyroid disease     hyperparathyroidism    Vitamin D deficiency      Family History   Problem Relation Age of Onset    Hypertension Mother     Diabetes Mother     Hypertension Father     Diabetes Father     Hypertension Sister      Social History     Socioeconomic History    Marital status: SINGLE     Spouse name: Not on file    Number of children: Not on file    Years of education: Not on file    Highest education level: Not on file   Occupational History    Not on file   Tobacco Use    Smoking status: Former Smoker     Years: 2.00    Smokeless tobacco: Never Used   Substance and Sexual Activity    Alcohol use: No    Drug use: No     Types: Prescription, OTC    Sexual activity: Not on file   Other Topics Concern    Not on file   Social History Narrative    Not on file     Social Determinants of Health     Financial Resource Strain:     Difficulty of Paying Living Expenses: Not on file   Food Insecurity:     Worried About Running Out of Food in the Last Year: Not on file    Ran Out of Food in the Last Year: Not on file   Transportation Needs:     Lack of Transportation (Medical): Not on file    Lack of Transportation (Non-Medical): Not on file   Physical Activity:     Days of Exercise per Week: Not on file    Minutes of Exercise per Session: Not on file   Stress:     Feeling of Stress : Not on file   Social Connections:     Frequency of Communication with Friends and Family: Not on file    Frequency of Social Gatherings with Friends and Family: Not on file    Attends [employer] Services: Not on file    Active Member of [address] [place] [address] [employer] or Organizations: Not on file    Attends Club or Organization Meetings: Not on file    Marital Status: Not on file   Intimate Partner Violence:     Fear of Current or Ex-Partner: Not on file    Emotionally Abused: Not on file    Physically Abused: Not on file    Sexually Abused: Not on file   Housing Stability:     Unable to Pay for Housing in the Last Year: Not on file    Number of [place] in the Last Year: Not on file    Unstable Housing in the Last Year: Not on file       Allergies:    Allergies   Allergen Reactions    Adhesive Tape-Silicones Itching     Itchiness        Current Medications:  Current Facility-Administered Medications   Medication Dose Route Frequency    albumin human 25% (BUMINATE) solution 25 g  25 g IntraVENous QID PRN    iopamidoL (ISOVUE 300) 61 % contrast injection 100 mL  100 mL IntraVENous RAD ONCE    lidocaine (XYLOCAINE) 20 mg/mL (2 %) injection 400 mg  20 mL SubCUTAneous RAD ONCE    iopamidoL (ISOVUE 300) 61 % contrast injection 50 mL  50 mL IntraCATHeter RAD ONCE    fentaNYL citrate (PF) injection 100 mcg  100 mcg IntraVENous Rad Multiple    oxyCODONE IR (ROXICODONE) tablet 10 mg  10 mg Oral Q4H PRN    0.9% sodium chloride infusion 250 mL  250 mL IntraVENous PRN    midodrine (PROAMATINE) tablet 20 mg  20 mg Oral Q8H    fludrocortisone (FLORINEF) tablet 0.1 mg 0.1 mg Oral BID    0.9% sodium chloride infusion 250 mL  250 mL IntraVENous PRN    gelatin sponge,absorb-porcine (GELFOAM) 100 sponge 1 Each  1 Each Topical PRN    fentaNYL citrate (PF) injection 50 mcg  50 mcg IntraVENous Q3H PRN    0.9% sodium chloride infusion 250 mL  250 mL IntraVENous PRN    traMADoL (ULTRAM) tablet 50 mg  50 mg Oral Q6H PRN    epoetin nabila-epbx (RETACRIT) injection 20,000 Units  20,000 Units SubCUTAneous Q TUE, THU & SAT    atorvastatin (LIPITOR) tablet 20 mg  20 mg Oral QHS    pantoprazole (PROTONIX) tablet 20 mg  20 mg Oral DAILY    pregabalin (LYRICA) capsule 25 mg  25 mg Oral DAILY    tiZANidine (ZANAFLEX) tablet 2 mg  2 mg Oral BID    piperacillin-tazobactam (ZOSYN) 3.375 g in 0.9% sodium chloride (MBP/ADV) 100 mL MBP  3.375 g IntraVENous Q12H    insulin regular (NOVOLIN R, HUMULIN R) injection   SubCUTAneous TIDAC    glucose chewable tablet 16 g  4 Tablet Oral PRN    dextrose (D50W) injection syrg 12.5-25 g  12.5-25 g IntraVENous PRN    glucagon (GLUCAGEN) injection 1 mg  1 mg IntraMUSCular PRN        Physical Exam:  Blood pressure (!) 115/58, pulse 83, temperature 97.7 °F (36.5 °C), resp. rate 16, weight 89 kg (196 lb 3.4 oz), SpO2 100 %. No acute distress  Good peripheral perfusion  Nonlabored respirations  Abdomen nondistended  Bilateral lower extremity amputations. No significant neck/chest swelling or superficial collateralization. Alerts:    Hospital Problems  Date Reviewed: 9/1/2021          Codes Class Noted POA    Gangrene of left foot [facility]) ICD-10-CM: N76  ICD-9-CM: 785.4  11/14/2021 Unknown              Laboratory:      Recent Labs     11/19/21  0816   HGB 8.1*   HCT 26.1*   WBC 11.0      BUN 12   CREA 3.06*   K 2.9*         Plan of Care/Planned Procedure:  Risks, benefits, and alternatives reviewed with patient and he agrees to proceed with the procedure.        Shawna Mcgregor MD

## 2021-11-19 NOTE — WOUND CARE
WOCN Note:     New consult placed for assessment of sacrum, buttocks and right foot. Assessed in room 425. Chart reviewed. Admitted DX:  Gangrene of left foot  11.16.21 s/p left above knee amputation; dressing dry and intact. Past Medical History: orthostatic hypotension, ESRD on HD, blindness, cad    Assessment:   Patient is A&O x 3, communicative and requires assist of 2 with repositioning. Bed: foam mattress  Patient wearing briefs for incontinence. Patient reports no pain. Patient repositioned on right side with pillow. Right Heel offloaded with pillows. Right Heel intact without erythema. 1. Right sacrum, partial thickness wound from moisture and friction: 1 x 1 x 0.1 cm  100% blanching red; scant serosang exudate; no odor. Periwound without erythema. 2.  Perineum red denudation from moisture. 3.  Right lateral foot, 100% black eschar: 3 x 2 x 0.1; centrally unroofed 1 x 1 x 0.1 cm 100% black eschar. 4.  Left medial thigh, partial thickness wound:  1.5 x 0.5 x 0.1 cm; 100% moist red; no odor or erythema. 5.  Right dorsal great toe, deflated bullae with dark base:  0.8 x 2 x 0 cm. 6.  Right medial foot, blanching red erythema with central hyperpigmentation. Wound, Pressure Prevention & Skin Care Recommendations:    1. Minimize layers of linen/pads under patient to optimize support surface. 2.  Turn/reposition approximately every 2 hours and offload heels. 3.  Manage moisture/ Keep skin folds clean and dry. 4.  Sacrum, perineum and thigh:  Apply Zinc cream TID and with incontinence care. 5.   Right medial foot and heel:  Venelex TID and float on pillow. Do not use boot. 6.  Specialty Bed:  Prius ordered from [facility].  7.  Sacrum: sacral foam dressing. Discussed above plan with patient and Niraj Carbone.     Transition of Care:   Plan to follow as needed while admitted to hospital.    ASHLEY Quintana RN [facility]  Available on Perfect Serve  Pager [phone]  Office [phone]

## 2021-11-19 NOTE — PROGRESS NOTES
[facility]   [address] [facility], [address] [place] [address] Rd [address], Formerly named [facility]  Phone: [phone]   [id number]       Nephrology Progress Note  Kelsi Valladares.     1950     [id number]  Date of Admission : 11/13/2021 11/19/21    CC: Follow up for ESRD       Assessment and Plan   ESRD- HD  - dialyzes TTS at [facility]   - next HD tomorrow   - Midodrine and Florinef prior to HD  - has Lft thigh AVG for access    Left LE cellulitis   Left foot Wet gangrene   - s/p AKA 11/16    Anemia in CKD  Blood loss anemia   - f/u H/h  - continue SUNNY  -Transfuse PRN for Hb < 8    Sec HPTH   - continue home binders     Chronic Hypotension   - continue Midodrine and Florinef -increased doses     Interval History:  Seen and examined. CBC pending. BP better. Reports constipation    Review of Systems: A comprehensive review of systems was negative except for that written in the HPI.     Current Medications:   Current Facility-Administered Medications   Medication Dose Route Frequency    oxyCODONE IR (ROXICODONE) tablet 10 mg  10 mg Oral Q4H PRN    0.9% sodium chloride infusion 250 mL  250 mL IntraVENous PRN    midodrine (PROAMATINE) tablet 20 mg  20 mg Oral Q8H    fludrocortisone (FLORINEF) tablet 0.1 mg  0.1 mg Oral BID    0.9% sodium chloride infusion 250 mL  250 mL IntraVENous PRN    gelatin sponge,absorb-porcine (GELFOAM) 100 sponge 1 Each  1 Each Topical PRN    fentaNYL citrate (PF) injection 50 mcg  50 mcg IntraVENous Q3H PRN    0.9% sodium chloride infusion 250 mL  250 mL IntraVENous PRN    traMADoL (ULTRAM) tablet 50 mg  50 mg Oral Q6H PRN    epoetin nabila-epbx (RETACRIT) injection 20,000 Units  20,000 Units SubCUTAneous Q TUE, THU & SAT    atorvastatin (LIPITOR) tablet 20 mg  20 mg Oral QHS    pantoprazole (PROTONIX) tablet 20 mg  20 mg Oral DAILY    pregabalin (LYRICA) capsule 25 mg  25 mg Oral DAILY    tiZANidine (ZANAFLEX) tablet 2 mg  2 mg Oral BID    piperacillin-tazobactam (ZOSYN) 3.375 g in 0.9% sodium chloride (MBP/ADV) 100 mL MBP  3.375 g IntraVENous Q12H    insulin regular (NOVOLIN R, HUMULIN R) injection   SubCUTAneous TIDAC    glucose chewable tablet 16 g  4 Tablet Oral PRN    dextrose (D50W) injection syrg 12.5-25 g  12.5-25 g IntraVENous PRN    glucagon (GLUCAGEN) injection 1 mg  1 mg IntraMUSCular PRN      Allergies   Allergen Reactions    Adhesive Tape-Silicones Itching     Itchiness        Objective:  Vitals:    Vitals:    11/19/21 0401 11/19/21 0600 11/19/21 0638 11/19/21 0700   BP:   (!) 100/51 119/67   Pulse: 75 75 82 78   Resp:   21 18   Temp:   97.5 °F (36.4 °C)    TempSrc:       SpO2:   100%    Weight:         Intake and Output:  No intake/output data recorded. 11/17 1901 - 11/19 0700  In: 684.2   Out: 3000     Physical Examination:  GEN: NAD,Blind  NECK- Supple, no mass  RESP: No wheezing, Clear b/l  CVS: S1,S2  RRR  NEURO: Normal speech, Non focal  EXT: Left AKA, bleeding stump  PSYCH:anxious  []    High complexity decision making was performed  []    Patient is at high-risk of decompensation with multiple organ involvement    Lab Data Personally Reviewed: I have reviewed all the pertinent labs, microbiology data and radiology studies during assessment.     Recent Labs     11/17/21  1325 11/17/21  1311    138   K 3.2* 3.6    104   CO2 26 26   * 163*   BUN 23* 24*   CREA 4.08* 4.04*   CA 7.5* 7.3*   PHOS  --  5.2*   ALB  --  1.0*     Recent Labs     11/19/21  0816 11/18/21  1516 11/17/21  1311 11/17/21  1308   WBC 11.0 8.6 11.9*  --    HGB 8.1* 6.6* 7.3* 7.3*   HCT 26.1* 21.0* 23.9* 23.9*    224 302  --      No results found for: SDES  Lab Results   Component Value Date/Time    Culture result: LIGHT STAPHYLOCOCCUS AUREUS (A) 11/14/2021 01:54 AM    Culture result: HEAVY MIXED ENTERIC GRAM NEGATIVE RODS (A) 11/14/2021 01:54 AM    Culture result: NO GROWTH 5 DAYS 11/13/2021 11:55 PM     Recent Results (from the past 24 hour(s))   GLUCOSE, POC    Collection Time: 11/18/21 10:33 AM   Result Value Ref Range    Glucose (POC) 91 65 - 117 mg/dL    Performed by Elle Cordero    RBC, ALLOCATE    Collection Time: 11/18/21 11:45 AM   Result Value Ref Range    HISTORY CHECKED? Historical check performed    GLUCOSE, POC    Collection Time: 11/18/21 12:13 PM   Result Value Ref Range    Glucose (POC) 98 65 - 117 mg/dL    Performed by Preeti Donahue    Collection Time: 11/18/21  3:15 PM   Result Value Ref Range    Hepatitis B surface Ag <0.10 Index    Hep B surface Ag Interp. Negative NEG     CBC WITH AUTOMATED DIFF    Collection Time: 11/18/21  3:16 PM   Result Value Ref Range    WBC 8.6 4.1 - 11.1 K/uL    RBC 2.26 (L) 4.10 - 5.70 M/uL    HGB 6.6 (L) 12.1 - 17.0 g/dL    HCT 21.0 (L) 36.6 - 50.3 %    MCV 92.9 80.0 - 99.0 FL    MCH 29.2 26.0 - 34.0 PG    MCHC 31.4 30.0 - 36.5 g/dL    RDW 16.3 (H) 11.5 - 14.5 %    PLATELET 083 655 - 441 K/uL    MPV 9.8 8.9 - 12.9 FL    NRBC 0.0 0  WBC    ABSOLUTE NRBC 0.00 0.00 - 0.01 K/uL    NEUTROPHILS 84 (H) 32 - 75 %    LYMPHOCYTES 8 (L) 12 - 49 %    MONOCYTES 4 (L) 5 - 13 %    EOSINOPHILS 2 0 - 7 %    BASOPHILS 1 0 - 1 %    IMMATURE GRANULOCYTES 1 (H) 0.0 - 0.5 %    ABS. NEUTROPHILS 7.2 1.8 - 8.0 K/UL    ABS. LYMPHOCYTES 0.7 (L) 0.8 - 3.5 K/UL    ABS. MONOCYTES 0.3 0.0 - 1.0 K/UL    ABS. EOSINOPHILS 0.2 0.0 - 0.4 K/UL    ABS. BASOPHILS 0.1 0.0 - 0.1 K/UL    ABS. IMM.  GRANS. 0.1 (H) 0.00 - 0.04 K/UL    DF SMEAR SCANNED      PLATELET COMMENTS Large Platelets      RBC COMMENTS ANISOCYTOSIS  1+       GLUCOSE, POC    Collection Time: 11/18/21  5:05 PM   Result Value Ref Range    Glucose (POC) 110 65 - 117 mg/dL    Performed by Pavithra Perdue, POC    Collection Time: 11/18/21  8:43 PM   Result Value Ref Range    Glucose (POC) 125 (H) 65 - 117 mg/dL    Performed by Cony Claudio    CBC WITH AUTOMATED DIFF    Collection Time: 11/19/21  8:16 AM   Result Value Ref Range    WBC 11.0 4.1 - 11.1 K/uL    RBC 2.78 (L) 4.10 - 5.70 M/uL    HGB 8.1 (L) 12.1 - 17.0 g/dL    HCT 26.1 (L) 36.6 - 50.3 %    MCV 93.9 80.0 - 99.0 FL    MCH 29.1 26.0 - 34.0 PG    MCHC 31.0 30.0 - 36.5 g/dL    RDW 16.5 (H) 11.5 - 14.5 %    PLATELET 812 507 - 119 K/uL    MPV 9.7 8.9 - 12.9 FL    NRBC 0.0 0  WBC    ABSOLUTE NRBC 0.00 0.00 - 0.01 K/uL    NEUTROPHILS 78 (H) 32 - 75 %    LYMPHOCYTES 8 (L) 12 - 49 %    MONOCYTES 7 5 - 13 %    EOSINOPHILS 4 0 - 7 %    BASOPHILS 1 0 - 1 %    IMMATURE GRANULOCYTES 2 (H) 0.0 - 0.5 %    ABS. NEUTROPHILS 8.7 (H) 1.8 - 8.0 K/UL    ABS. LYMPHOCYTES 0.9 0.8 - 3.5 K/UL    ABS. MONOCYTES 0.7 0.0 - 1.0 K/UL    ABS. EOSINOPHILS 0.4 0.0 - 0.4 K/UL    ABS. BASOPHILS 0.1 0.0 - 0.1 K/UL    ABS. IMM. GRANS. 0.2 (H) 0.00 - 0.04 K/UL    DF AUTOMATED     GLUCOSE, POC    Collection Time: 11/19/21  8:31 AM   Result Value Ref Range    Glucose (POC) 121 (H) 65 - 117 mg/dL    Performed by Brandt Hernandez            Total time spent with patient:  xxx   min. Care Plan discussed with:  Patient     Family      RN      Consulting Physician [facility]        I have reviewed the flowsheets. Chart and Pertinent Notes have been reviewed. No change in PMH ,family and social history from Consult note.       Lyly Marquis MD

## 2021-11-20 NOTE — PROGRESS NOTES
Order received to hold on blood transfusion as H&H is 8 & 26.3; order also received to hold on amiodarone drip. Settings on heart monitor were changed and heart rate is now registering in the 90s.

## 2021-11-20 NOTE — PROGRESS NOTES
[facility]   [address] [facility], [address] [place] [address], [facility]  Phone: [phone]   [phone]       Nephrology Progress Note  Yousif Lagos.     1950     [id number]  Date of Admission : 11/13/2021 11/20/21    CC: Follow up for esrd       Assessment and Plan   ESRD- HD  - dialyzes TTS at 3 chopt   -  DIALYSIS TODAY   - Midodrine and Florinef prior to HD  - has Lft thigh AVG for access    Left LE cellulitis     Hypokalemia  - 4 k bath    Left foot Wet gangrene -  - s/p AKA 11/16    Anemia in CKD  Blood loss anemia   - f/u H/h  - continue SUNNY  -Transfuse PRN for Hb < 8    Sec HPTH   - continue home binders     Chronic Hypotension   - continue Midodrine and Florinef -increased doses     Interval History:  Seen and examined. Review of Systems: A comprehensive review of systems was negative except for that written in the HPI.     Current Medications:   Current Facility-Administered Medications   Medication Dose Route Frequency    0.9% sodium chloride infusion 250 mL  250 mL IntraVENous PRN    potassium chloride 20 mEq in 50 ml IVPB  20 mEq IntraVENous Q2H    magnesium sulfate 2 g/50 ml IVPB (premix or compounded)  2 g IntraVENous ONCE    amiodarone (CORDARONE) 150 mg in dextrose 5% 100 mL bolus infusion  150 mg IntraVENous ONCE    amiodarone (CORDARONE) 375 mg/250 mL D5W infusion  0.5-1 mg/min IntraVENous TITRATE    albumin human 25% (BUMINATE) solution 25 g  25 g IntraVENous QID PRN    balsam peru-castor oiL (VENELEX) ointment   Topical TID    oxyCODONE IR (ROXICODONE) tablet 10 mg  10 mg Oral Q4H PRN    0.9% sodium chloride infusion 250 mL  250 mL IntraVENous PRN    midodrine (PROAMATINE) tablet 20 mg  20 mg Oral Q8H    fludrocortisone (FLORINEF) tablet 0.1 mg  0.1 mg Oral BID    0.9% sodium chloride infusion 250 mL  250 mL IntraVENous PRN    gelatin sponge,absorb-porcine (GELFOAM) 100 sponge 1 Each  1 Each Topical PRN    fentaNYL citrate (PF) injection 50 mcg  50 mcg IntraVENous Q3H PRN    0.9% sodium chloride infusion 250 mL  250 mL IntraVENous PRN    traMADoL (ULTRAM) tablet 50 mg  50 mg Oral Q6H PRN    epoetin nabila-epbx (RETACRIT) injection 20,000 Units  20,000 Units SubCUTAneous Q TUE, THU & SAT    atorvastatin (LIPITOR) tablet 20 mg  20 mg Oral QHS    pantoprazole (PROTONIX) tablet 20 mg  20 mg Oral DAILY    pregabalin (LYRICA) capsule 25 mg  25 mg Oral DAILY    [Held by provider] tiZANidine (ZANAFLEX) tablet 2 mg  2 mg Oral BID    piperacillin-tazobactam (ZOSYN) 3.375 g in 0.9% sodium chloride (MBP/ADV) 100 mL MBP  3.375 g IntraVENous Q12H    insulin regular (NOVOLIN R, HUMULIN R) injection   SubCUTAneous TIDAC    glucose chewable tablet 16 g  4 Tablet Oral PRN    dextrose (D50W) injection syrg 12.5-25 g  12.5-25 g IntraVENous PRN    glucagon (GLUCAGEN) injection 1 mg  1 mg IntraMUSCular PRN      Allergies   Allergen Reactions    Adhesive Tape-Silicones Itching     Itchiness        Objective:  Vitals:    Vitals:    11/20/21 0800 11/20/21 0814 11/20/21 1000 11/20/21 1200   BP:  117/65  139/76   Pulse: 82 88 (!) 104 (!) 153   Resp:  17  14   Temp:  98 °F (36.7 °C)  98 °F (36.7 °C)   TempSrc:       SpO2:  100%  93%   Weight:         Intake and Output:  No intake/output data recorded. No intake/output data recorded. Physical Examination:  GEN: NAD,Blind  NECK- Supple, no mass  RESP: No wheezing,CLEAR b/l  CVS: S1,S2  RRR  NEURO: Normal speech, NON FOCAL  EXT: Left AKA, bleeding stump    []    High complexity decision making was performed  []    Patient is at high-risk of decompensation with multiple organ involvement    Lab Data Personally Reviewed: I have reviewed all the pertinent labs, microbiology data and radiology studies during assessment.     Recent Labs     11/20/21  0516 11/19/21  1951 11/19/21  0816 11/17/21  1325 11/17/21  1311     --  138 139 138   K 2.5*  --  2.9* 3.2* 3.6   *  --  106 104 104   CO2 23  --  29 26 26   *  -- 127* 162* 163*   BUN 14  --  12 23* 24*   CREA 3.40*  --  3.06* 4.08* 4.04*   CA 6.9*  --  8.0* 7.5* 7.3*   MG 1.6  --   --   --   --    PHOS 3.6  --  3.7  --  5.2*   ALB 2.0*  --  2.2*  --  1.0*   INR  --  1.3*  --   --   --      Recent Labs     11/20/21  0516 11/19/21  1951 11/19/21  0816 11/18/21  1516 11/17/21  1311   WBC 10.4 13.1* 11.0 8.6 11.9*   HGB 7.1* 8.3* 8.1* 6.6* 7.3*   HCT 23.2* 27.2* 26.1* 21.0* 23.9*    263 240 224 302     No results found for: SDES  Lab Results   Component Value Date/Time    Culture result: LIGHT STAPHYLOCOCCUS AUREUS (A) 11/14/2021 01:54 AM    Culture result: HEAVY MIXED ENTERIC GRAM NEGATIVE RODS (A) 11/14/2021 01:54 AM    Culture result: NO GROWTH 5 DAYS 11/13/2021 11:55 PM     Recent Results (from the past 24 hour(s))   GLUCOSE, POC    Collection Time: 11/19/21  4:47 PM   Result Value Ref Range    Glucose (POC) 176 (H) 65 - 117 mg/dL    Performed by Bridgette Damon    CBC WITH AUTOMATED DIFF    Collection Time: 11/19/21  7:51 PM   Result Value Ref Range    WBC 13.1 (H) 4.1 - 11.1 K/uL    RBC 2.88 (L) 4.10 - 5.70 M/uL    HGB 8.3 (L) 12.1 - 17.0 g/dL    HCT 27.2 (L) 36.6 - 50.3 %    MCV 94.4 80.0 - 99.0 FL    MCH 28.8 26.0 - 34.0 PG    MCHC 30.5 30.0 - 36.5 g/dL    RDW 16.7 (H) 11.5 - 14.5 %    PLATELET 951 323 - 868 K/uL    MPV 9.6 8.9 - 12.9 FL    NRBC 0.3 (H) 0  WBC    ABSOLUTE NRBC 0.04 (H) 0.00 - 0.01 K/uL    NEUTROPHILS 79 (H) 32 - 75 %    LYMPHOCYTES 10 (L) 12 - 49 %    MONOCYTES 6 5 - 13 %    EOSINOPHILS 2 0 - 7 %    BASOPHILS 1 0 - 1 %    IMMATURE GRANULOCYTES 2 (H) 0.0 - 0.5 %    ABS. NEUTROPHILS 10.3 (H) 1.8 - 8.0 K/UL    ABS. LYMPHOCYTES 1.3 0.8 - 3.5 K/UL    ABS. MONOCYTES 0.8 0.0 - 1.0 K/UL    ABS. EOSINOPHILS 0.3 0.0 - 0.4 K/UL    ABS. BASOPHILS 0.1 0.0 - 0.1 K/UL    ABS. IMM.  GRANS. 0.2 (H) 0.00 - 0.04 K/UL    DF AUTOMATED     COAGULATION SCREEN    Collection Time: 11/19/21  7:51 PM   Result Value Ref Range    Fibrinogen 337 200 - 475 mg/dL INR 1.3 (H) 0.9 - 1.1      Prothrombin time 13.4 (H) 9.0 - 11.1 sec    aPTT 45.7 (H) 22.1 - 31.0 sec    aPTT, therapeutic range     58.0 - 77.0 SECS   RENAL FUNCTION PANEL    Collection Time: 11/20/21  5:16 AM   Result Value Ref Range    Sodium 143 136 - 145 mmol/L    Potassium 2.5 (LL) 3.5 - 5.1 mmol/L    Chloride 112 (H) 97 - 108 mmol/L    CO2 23 21 - 32 mmol/L    Anion gap 8 5 - 15 mmol/L    Glucose 117 (H) 65 - 100 mg/dL    BUN 14 6 - 20 MG/DL    Creatinine 3.40 (H) 0.70 - 1.30 MG/DL    BUN/Creatinine ratio 4 (L) 12 - 20      GFR est AA 22 (L) >60 ml/min/1.73m2    GFR est non-AA 18 (L) >60 ml/min/1.73m2    Calcium 6.9 (L) 8.5 - 10.1 MG/DL    Phosphorus 3.6 2.6 - 4.7 MG/DL    Albumin 2.0 (L) 3.5 - 5.0 g/dL   CBC WITH AUTOMATED DIFF    Collection Time: 11/20/21  5:16 AM   Result Value Ref Range    WBC 10.4 4.1 - 11.1 K/uL    RBC 2.41 (L) 4.10 - 5.70 M/uL    HGB 7.1 (L) 12.1 - 17.0 g/dL    HCT 23.2 (L) 36.6 - 50.3 %    MCV 96.3 80.0 - 99.0 FL    MCH 29.5 26.0 - 34.0 PG    MCHC 30.6 30.0 - 36.5 g/dL    RDW 16.8 (H) 11.5 - 14.5 %    PLATELET 060 236 - 363 K/uL    MPV 9.7 8.9 - 12.9 FL    NRBC 0.5 (H) 0  WBC    ABSOLUTE NRBC 0.05 (H) 0.00 - 0.01 K/uL    NEUTROPHILS 67 32 - 75 %    BAND NEUTROPHILS 4 0 - 6 %    LYMPHOCYTES 16 12 - 49 %    MONOCYTES 6 5 - 13 %    EOSINOPHILS 3 0 - 7 %    BASOPHILS 2 (H) 0 - 1 %    MYELOCYTES 2 (H) 0 %    IMMATURE GRANULOCYTES 0 %    ABS. NEUTROPHILS 7.4 1.8 - 8.0 K/UL    ABS. LYMPHOCYTES 1.7 0.8 - 3.5 K/UL    ABS. MONOCYTES 0.6 0.0 - 1.0 K/UL    ABS. EOSINOPHILS 0.3 0.0 - 0.4 K/UL    ABS. BASOPHILS 0.2 (H) 0.0 - 0.1 K/UL    ABS. IMM. GRANS. 0.0 K/UL    DF MANUAL      PLATELET COMMENTS CLUMPED PLATELETS      RBC COMMENTS ANISOCYTOSIS  1+        RBC COMMENTS MACROCYTOSIS  1+       MAGNESIUM    Collection Time: 11/20/21  5:16 AM   Result Value Ref Range    Magnesium 1.6 1.6 - 2.4 mg/dL   RBC, ALLOCATE    Collection Time: 11/20/21  7:45 AM   Result Value Ref Range    HISTORY CHECKED? Historical check performed    GLUCOSE, POC    Collection Time: 11/20/21  8:19 AM   Result Value Ref Range    Glucose (POC) 128 (H) 65 - 117 mg/dL    Performed by [address]    Collection Time: 11/20/21  9:29 AM   Result Value Ref Range    Crossmatch Expiration 11/23/2021,2359     ABO/Rh(D) B POSITIVE     Antibody screen NEG     Unit number [id number]     Blood component type RC LR     Unit division 00     Status of unit ALLOCATED     Crossmatch result Compatible    GLUCOSE, POC    Collection Time: 11/20/21 11:39 AM   Result Value Ref Range    Glucose (POC) 135 (H) 65 - 117 mg/dL    Performed by Johny Garcia            Total time spent with patient:  xxx   min. Care Plan discussed with:  Patient     Family      RN      Consulting Physician [facility],         I have reviewed the flowsheets. Chart and Pertinent Notes have been reviewed. No change in PMH ,family and social history from Consult note.       Mikal Ornelas MD

## 2021-11-20 NOTE — PROGRESS NOTES
0840- Critical K+ of 2.5 received by [facility]; Verbal report to Dr. Buffy Kilgore at 0900. Pt is due for dialysis today.

## 2021-11-20 NOTE — PROGRESS NOTES
[address] [facility] Adult  Hospitalist Group                                                                                          Hospitalist Progress Note  Nicholas Mcclure MD  Answering service: [phone] from in house phone        Date of Service:  2021  NAME:  Kelsi Valladares. :  1950  MRN:  [id number]      Admission Summary:   Selam Desai Jr. is a 66 y. o. male with hx of orthostatic hypotension, ESRD on HD, Blindness, cad who presents to hospital from LTCF for evaluation of left foot infection. Pt states he has had pain in his left foot for the last month. Unable to describe any additional changes due to his poor eye-sight. States he has otherwise been doing and feeling well. The patient denies any fever, chills, chest or abdominal pain, nausea, vomiting, cough, congestion, recent illness, palpitations, or dysuria. Remarkable vitals on ER Presentations: unremarkable  Labs Remarkable for: wbc 20.1, hgb 10.4, crp 18.90, esr 41,   ER Images: CT LLE: Suboptimal examination secondary to motion artifact. No definite bony  destruction is evident. Diffuse osteopenia. Diffuse soft tissue swelling in the  left foot. Interval history / Subjective:      I saw and examined him this morning. Chart reviewed. Complains of \"soreness\" at the amputation site. Otherwise no complaints. Assessment & Plan:     Hypokalemia,K 2. 5. Intermittently tachycardiac,frequent PVCs. Rhythm appears a flutter on monitor,frequent PCVs.  --KCl 20 meq x2 dose. Check Mg++. Don't see EKG this admission,ordered stat EKG       Osteomyelitis,  Left Foot w/ Gagrene of LLE 3rd et 4th Digit (POA)   - S/PLeft AKA ()  - Wound culture prior to AKA grew light mssa and heavy mixed enetric GNR. Blood cx negative. D/cd vancomycin. Continue zosyn  -Hold eliquis, asa and plavix  - Vascular surgery following       Bleeding from the left AKA site  --Resolved.     Acute post op blood loss anemia on top of chronic anemia of chronic disease  Baseline HB~10  --HB 7.1 after 2 units,Transfuse for Hb<8 ,additional one unit ordered     ESRD on HD  -Nephro following for HD management   - Patient is TTS schedule      CAD  -Hold eliquis, asa and plavix,resume when okay by surgery     NIDDM II w/ Diabetic Neuropathy  -SSI + Hypoglycemic protocols  -Accuchecks q6 while npo     Orthostatic Hypotension likely worsened post due to acute blood loss. BP better this morning  -Home midrodine and fludrocortisone  -received a unit of blood. albumin as needed  -BP stable,SBp>100    Difficult access. left subclavian central line placed by IR on 11/19     Code status: Full   DVT prophylaxis:     Care Plan discussed with: Patient/Family and Nurse  Anticipated Disposition: Return to facilty   Anticipated Discharge: 24 hours to 48 hours     Hospital Problems  Date Reviewed: 9/1/2021          Codes Class Noted POA    Gangrene of left foot ([facility] [address] ICD-10-CM: D23  ICD-9-CM: 785.4  11/14/2021 Unknown                Review of Systems:   A comprehensive review of systems was negative except for that written in the HPI. Vital Signs:    Last 24hrs VS reviewed since prior progress note. Most recent are:  Visit Vitals  /65 (BP 1 Location: Right upper arm, BP Patient Position: At rest)   Pulse 88   Temp 98 °F (36.7 °C)   Resp 17   Wt 89 kg (196 lb 3.4 oz)   SpO2 100%   BMI 28.98 kg/m²       No intake or output data in the 24 hours ending 11/20/21 0954     Physical Examination:             Constitutional:  Alert and oriented x4. Blind   ENT:  Oral mucosa moist, oropharynx benign. Resp:  CTA bilaterally. No wheezing/rhonchi/rales. No accessory muscle use   CV:  Regular rhythm, normal rate, no murmurs, gallops, rubs    GI:  Soft, non distended, non tender. normoactive bowel sounds,    Musculoskeletal:  No edema, warm, 2+ pulses throughout. Left AKA bandaged. Neurologic:  AAOx3, CN II-XII reviewed. Blind. Moves all extremities.       Psych:  Good insight, Not anxious nor agitated. Data Review:    Review and/or order of clinical lab test  Review and/or order of tests in the medicine section of [facility]      Labs:     Recent Labs     11/20/21  0516 11/19/21 1951   WBC 10.4 13.1*   HGB 7.1* 8.3*   HCT 23.2* 27.2*    263     Recent Labs     11/20/21  0516 11/19/21  0816 11/17/21  1325 11/17/21  1311 11/17/21  1311    138 139   < > 138   K 2.5* 2.9* 3.2*   < > 3.6   * 106 104   < > 104   CO2 23 29 26   < > 26   BUN 14 12 23*   < > 24*   CREA 3.40* 3.06* 4.08*   < > 4.04*   * 127* 162*   < > 163*   CA 6.9* 8.0* 7.5*   < > 7.3*   PHOS 3.6 3.7  --   --  5.2*    < > = values in this interval not displayed. Recent Labs     11/20/21  0516 11/19/21  0816 11/17/21 1311   ALB 2.0* 2.2* 1.0*     Recent Labs     11/19/21 1951   INR 1.3*   PTP 13.4*   APTT 45.7*      No results for input(s): FE, TIBC, PSAT, FERR in the last 72 hours. No results found for: FOL, RBCF   No results for input(s): PH, PCO2, PO2 in the last 72 hours. No results for input(s): CPK, CKNDX, TROIQ in the last 72 hours.     No lab exists for component: CPKMB  Lab Results   Component Value Date/Time    Cholesterol, total 189 08/12/2011 10:06 AM    HDL Cholesterol 37 (L) 08/12/2011 10:06 AM    LDL, calculated 127 (H) 08/12/2011 10:06 AM    Triglyceride 125 08/12/2011 10:06 AM     Lab Results   Component Value Date/Time    Glucose (POC) 128 (H) 11/20/2021 08:19 AM    Glucose (POC) 176 (H) 11/19/2021 04:47 PM    Glucose (POC) 133 (H) 11/19/2021 12:05 PM    Glucose (POC) 121 (H) 11/19/2021 08:31 AM    Glucose (POC) 125 (H) 11/18/2021 08:43 PM     No results found for: COLOR, APPRN, SPGRU, REFSG, JOYA, PROTU, GLUCU, KETU, BILU, UROU, LAWRENCE, LEUKU, GLUKE, EPSU, BACTU, WBCU, RBCU, CASTS, UCRY      Medications Reviewed:     Current Facility-Administered Medications   Medication Dose Route Frequency    0.9% sodium chloride infusion 250 mL  250 mL IntraVENous PRN    potassium chloride 20 mEq in 50 ml IVPB  20 mEq IntraVENous Q2H    albumin human 25% (BUMINATE) solution 25 g  25 g IntraVENous QID PRN    balsam peru-castor oiL (VENELEX) ointment   Topical TID    oxyCODONE IR (ROXICODONE) tablet 10 mg  10 mg Oral Q4H PRN    0.9% sodium chloride infusion 250 mL  250 mL IntraVENous PRN    midodrine (PROAMATINE) tablet 20 mg  20 mg Oral Q8H    fludrocortisone (FLORINEF) tablet 0.1 mg  0.1 mg Oral BID    0.9% sodium chloride infusion 250 mL  250 mL IntraVENous PRN    gelatin sponge,absorb-porcine (GELFOAM) 100 sponge 1 Each  1 Each Topical PRN    fentaNYL citrate (PF) injection 50 mcg  50 mcg IntraVENous Q3H PRN    0.9% sodium chloride infusion 250 mL  250 mL IntraVENous PRN    traMADoL (ULTRAM) tablet 50 mg  50 mg Oral Q6H PRN    epoetin nabila-epbx (RETACRIT) injection 20,000 Units  20,000 Units SubCUTAneous Q TUE, THU & SAT    atorvastatin (LIPITOR) tablet 20 mg  20 mg Oral QHS    pantoprazole (PROTONIX) tablet 20 mg  20 mg Oral DAILY    pregabalin (LYRICA) capsule 25 mg  25 mg Oral DAILY    tiZANidine (ZANAFLEX) tablet 2 mg  2 mg Oral BID    piperacillin-tazobactam (ZOSYN) 3.375 g in 0.9% sodium chloride (MBP/ADV) 100 mL MBP  3.375 g IntraVENous Q12H    insulin regular (NOVOLIN R, HUMULIN R) injection   SubCUTAneous TIDAC    glucose chewable tablet 16 g  4 Tablet Oral PRN    dextrose (D50W) injection syrg 12.5-25 g  12.5-25 g IntraVENous PRN    glucagon (GLUCAGEN) injection 1 mg  1 mg IntraMUSCular PRN     ______________________________________________________________________  EXPECTED LENGTH OF STAY: 10d 9h  ACTUAL LENGTH OF STAY:          6                 Ila Petty MD

## 2021-11-20 NOTE — PROCEDURES
Hemodialysis / [phone]    Vitals Pre Post Assessment Pre Post   BP BP: (!) 126/59 (11/20/21 1433) 151/98 LOC A&OX4 A&OX4   HR Pulse (Heart Rate): 74 (11/20/21 1433) 92 Lungs CTA NC 4L MASON CTA N4L   Resp Resp Rate: 13 (11/20/21 1433) 18 Cardiac NSR NSR PVCs   Temp Temp: 97.4 °F (36.3 °C) (11/20/21 1421) 97.7 Skin W/D New left AKA/graft in left thigh New left AKA/ left thigh graft   Weight Pre-Dialysis Weight: 88 kg (194 lb 0.1 oz) (11/18/21 1415)  Edema +3 Left thigh +3 left thigh   Tele status bedside bedside Pain Pain Intensity 1: 8 (11/20/21 0814) 0     Orders   Duration: Start: 14:33 End: 18:00 Total: 3.5   Dialyzer: Dialyzer/Set Up Inspection: Mahnaz Delgadillo (11/20/21 1433)   K Bath: Dialysate K (mEq/L): 4 (11/20/21 1433)   Ca Bath: Dialysate CA (mEq/L): 2.5 (11/20/21 1433)   Na: Dialysate NA (mEq/L): 138 (11/20/21 1433)   Bicarb: Dialysate HCO3 (mEq/L): 35 (11/20/21 1433)   Target Fluid Removal: Goal/Amount of Fluid to Remove (mL): 1000 mL (11/20/21 1433)     Access   Type & Location: Left thigh av graft   Comments:                                   Accessed with two 15 gauge needles     Labs   HBsAg (Antigen) / date:        Negative 11/18/21                                       HBsAb (Antibody) / date: Susceptible 11/18/21   Source:    Obtained/Reviewed  Critical Results Called HGB   Date Value Ref Range Status   11/20/2021 8.0 (L) 12.1 - 17.0 g/dL Final     Potassium   Date Value Ref Range Status   11/20/2021 3.5 3.5 - 5.1 mmol/L Final     Comment:     INVESTIGATED PER DELTA CHECK PROTOCOL     Calcium   Date Value Ref Range Status   11/20/2021 7.5 (L) 8.5 - 10.1 MG/DL Final     BUN   Date Value Ref Range Status   11/20/2021 17 6 - 20 MG/DL Final     Creatinine   Date Value Ref Range Status   11/20/2021 4.11 (H) 0.70 - 1.30 MG/DL Final        Meds Given   Name Dose Route                    Adequacy / Fluid    Total Liters Process: 77.1   Net Fluid Removed: 2000      Comments   Time Out Done:   (Time) 14:30 Admitting Diagnosis: Left foot gangrene - AKA   Consent obtained/signed: Informed Consent Verified: Yes (11/20/21 4000)   Machine / Onel Acosta # Machine Number: [id number] (11/20/21 9035)   Primary Nurse Rpt Pre: Luis Alberto Caldera RN   Primary Nurse Rpt Post: Luis Alberto Caldera RN   Pt Education: Access/ fluid intake   Care Plan: Continue current plan of care   Pts outpatient clinic: [facility] Summary  Report received from primary RN. Time out and assessment performed and documented. Left thigh graft cleansed with alcohol and Prrevantix per protocol. Accessed with two 15 gauge needles. Treatment started at 14:33  Treatment without issues. Treatment ended at 18:00 All possible blood returned to patient. Needles removed and access held until hemostasis achieved. Held each site 15-20 minutes.     Comments:

## 2021-11-20 NOTE — PROGRESS NOTES
1900: Paged Aris Pina, JONATHAN about pt BP 89/50. Amputation site no longer actively bleeding. Notified NP about small amount of blood coming from new central line. Albumin given and blood work sent. 1930: Bedside shift change report given to Susan Webster RN (oncoming nurse) by Aldo Ricks RN (offgoing nurse). Report included the following information SBAR, Kardex, MAR, Recent Results and Cardiac Rhythm NSR.

## 2021-11-20 NOTE — PROGRESS NOTES
Physical Therapy  Orders received and chart reviewed. Met with patient and his POA who was present. He is from [address] [place] [address] and was getting assistance from staff to get to [facility] and assist with transfers to toilet via \"lifting\". Propelled WC himself. Dialysis arrived to initiate dialysis. Will hold evaluation at this time. Can follow up tomorrow. Patient to return to LTC but may benefit from rehab to maximize mobility but will further assess tomorrow.   Ant Martin, PT

## 2021-11-20 NOTE — PROGRESS NOTES
We were asked to see regarding a questionable tachycardia noted on telemetry. There was also concern that the patient may have had a pacemaker and that this abnormality indicated pacer dysfunction. Last summer he was evaluated by Dr. Raúl Chamorro for bradycardia then occurred on dialysis and there was some consideration for a pacemaker. since he did not meet criteria for that at the time, an implantable loop was inserted in August and thus far has been unrevealing. I reviewed the strips from earlier this afternoon. Lead to shows a very low voltage QRS with PVCs however the V lead shows a normal looking QRS with PVCs this corresponds to what is seen in lead II. For some reason of the lead II was not picking up and the computer was counting but the P waves and the QRSs which gave a false reading of the heart rate of about 150 bpm.  The nurses of troubleshooting things repositioned his telemetry pads and spoken with the monitor techs who changed their settings and now both lead to and the the leads look completely normal.    This is all artifactual and therefore he does not need any medication adjustments. He also needs no further cardiac testing.   I explained this in detail to the wife, the charge nurse, and to the attending hospitalist.

## 2021-11-21 NOTE — PROGRESS NOTES
Problem: Mobility Impaired (Adult and Pediatric)  Goal: *Acute Goals and Plan of Care (Insert Text)  Description: FUNCTIONAL STATUS PRIOR TO ADMISSION: pt reports being bed bound for one month and prior to that being able to get up to a wheelchair stand pivot transfer. He reports not using any supplemental 02 at baseline. HOME SUPPORT PRIOR TO ADMISSION: The patient lived in a SNF LTC. Physical Therapy Goals  Initiated 11/21/2021  1. Patient will move from supine to sit and sit to supine , scoot up and down, and roll side to side in bed with moderate assistance  within 7 day(s). 2.  Patient will sit at the EOB engaged in activity for 5 minutes with min assist for sitting balance within 7 day(s). 3.  Patient will participate in LE ex and desensitization techniques for LLE in  within 7 day(s). Outcome: Progressing Towards Goal   PHYSICAL THERAPY EVALUATION  Patient: Debra Snyder (89 y.o. male)  Date: 11/21/2021  Primary Diagnosis: Gangrene of left foot ([address] [I96]  Procedure(s) (LRB):  LEFT ABOVE KNEE AMPUTATION (Left) 5 Days Post-Op   Precautions:   Fall, Skin, Bed Alarm (blind)    ASSESSMENT  Based on the objective data described below, the patient presents with impaired ROM and strength (barely functional), global edema, decreased use of bilateral UEs at the shoulder, blindness, and nearly at a total care level. He was admitted from [facility] with LLE gangrene and is s/p a left AKA. Per pt he was bed bound for a month prior to admission because of the pain in his LLE. He would benefit from PT for bed level activities and for post op pain management of left leg. Anticipate slow gains and return to LTC.     Visit Vitals  /73 (BP 1 Location: Right upper arm, BP Patient Position: Semi fowlers)   Pulse 96   Temp 98.4 °F (36.9 °C)   Resp 18   Wt 89 kg (196 lb 3.4 oz)   SpO2 100%   BMI 28.98 kg/m²        Current Level of Function Impacting Discharge (mobility/balance): max to total assist with impaired sitting balance. Functional Outcome Measure: The patient scored 0/28 on the Tinetti outcome measure which is indicative of high fall risk. .      Other factors to consider for discharge: blind, reported bed bound for a month prior to admission, heart failure, ESRD on hemodialysis (access in RLE), DM     Patient will benefit from skilled therapy intervention to address the above noted impairments. PLAN :  Recommendations and Planned Interventions: bed mobility training, therapeutic exercises, edema management/control, patient and family training/education, and therapeutic activities      Frequency/Duration: Patient will be followed by physical therapy:  2 times a week to address goals. Recommendation for discharge: (in order for the patient to meet his/her long term goals)  Therapy up to 5 days/week in SNF setting, return to LTC    This discharge recommendation:  A follow-up discussion with the attending provider and/or case management is planned    IF patient discharges home will need the following DME: patient owns DME required for discharge         SUBJECTIVE:   Patient stated Familia White was hurting so bad, referring to his left leg prior to amputation    OBJECTIVE DATA SUMMARY:   Consult received, chart reviewed, pt cleared by nursing.   HISTORY:    Past Medical History:   Diagnosis Date    Adverse effect of anesthesia     \"at [facility]'s I had a little problem with my lungs, like they tried to colapse, but they got me squared away\"    Arthritis     CAD (coronary artery disease)     Chronic kidney disease     ESRD, [facility] [place]/T,Thurs,Sat    Diabetes ([address]     type 2    Heart failure ([address] 2009    Hyperlipidemia     Hypertension     Legal blindness     Orthostatic hypotension     SOB (shortness of breath)     O2 AT 2LPM PRN    Thyroid disease     hyperparathyroidism    Vitamin D deficiency      Past Surgical History:   Procedure Laterality Date    HX APPENDECTOMY      HX HEENT      lasik,  left optic nerve surgery    HX VASCULAR ACCESS      right dialysis access femoral    IR INSERT NON TUNL CVC OVER 5 YRS  11/19/2021    DE ABDOMEN SURGERY PROC UNLISTED  11/01/2018    lower abdominal bowels removed    VASCULAR SURGERY PROCEDURE UNLIST      fistula r arm,        Personal factors and/or comorbidities impacting plan of care: blind, reported bed bound for a month prior to admission, heart failure, ESRD on hemodialysis (access in RLE), DM    Home Situation  Home Environment: Long term care  One/Two Story Residence: One story  Living Alone: No  Support Systems: Spouse/Significant Other  Patient Expects to be Discharged to[de-identified] Long-term care  Current DME Used/Available at Home: Wheelchair    EXAMINATION/PRESENTATION/DECISION MAKING:   Critical Behavior:  Neurologic State: Alert  Orientation Level: Oriented X4  Cognition: Appropriate decision making, Appropriate for age attention/concentration, Appropriate safety awareness, Follows commands     Hearing: Auditory  Auditory Impairment: None  Skin:  refer to MD and nursing notes. Post op bandage on LLE  Edema: yes, global  Range Of Motion:  AROM: Generally decreased, barely functional (LEs, UEs nonfunctional at the shoulders)            Right knee flexion contracture of about -30 degrees           Strength:    Strength: Generally decreased, barely functional (LEs)                    Tone & Sensation:                  Sensation: Intact (LEs to light touch), hypersensitive LLE               Coordination:     Vision:      Functional Mobility:  Bed Mobility:  Rolling: Maximum assistance; Bed Modified (cues)  Supine to Sit: Total assistance  Sit to Supine: Total assistance     Transfers:                             Balance:   Sitting: Impaired; With support  Sitting - Static: Poor (constant support)  Sitting - Dynamic: Poor (constant support)  Ambulation/Gait Training:                                                         Stairs:               Therapeutic Exercises: Educated him in desensitization techniques for LLE    Functional Measure:  Tinetti test:    Sitting Balance: 0  Arises: 0  Attempts to Rise: 0  Immediate Standing Balance: 0  Standing Balance: 0  Nudged: 0  Eyes Closed: 0  Turn 360 Degrees - Continuous/Discontinuous: 0  Turn 360 Degrees - Steady/Unsteady: 0  Sitting Down: 0  Balance Score: 0 Balance total score  Indication of Gait: 0  R Step Length/Height: 0  L Step Length/Height: 0  R Foot Clearance: 0  L Foot Clearance: 0  Step Symmetry: 0  Step Continuity: 0  Path: 0  Trunk: 0  Walking Time: 0  Gait Score: 0 Gait total score  Total Score: 0/28 Overall total score         Tinetti Tool Score Risk of Falls  <19 = High Fall Risk  19-24 = Moderate Fall Risk  25-28 = Low Fall Risk  Tinetti ME. Performance-Oriented Assessment of Mobility Problems in Elderly Patients. Mix 66; [id number].  (Scoring Description: PT Bulletin Feb. 10, 1993)    Older adults: Arianne Whittington et al, 2009; n = 1000 [facility] elderly evaluated with ABC, AGNES, ADL, and IADL)  · Mean AGNES score for males aged 69-68 years = 26.21(3.40)  · Mean AGNES score for females age 69-68 years = 25.16(4.30)  · Mean AGNES score for males over 80 years = 23.29(6.02)  · Mean AGNES score for females over 80 years = 17.20(8.32)           Physical Therapy Evaluation Charge Determination   History Examination Presentation Decision-Making   HIGH Complexity :3+ comorbidities / personal factors will impact the outcome/ POC  MEDIUM Complexity : 3 Standardized tests and measures addressing body structure, function, activity limitation and / or participation in recreation  LOW Complexity : Stable, uncomplicated  LOW Complexity : FOTO score of       Based on the above components, the patient evaluation is determined to be of the following complexity level: MEDIUM    Pain Rating:  None rated    Activity Tolerance:   Fair and requires rest breaks    After treatment patient left in no apparent distress:   Supine in bed, Call bell within reach, Bed / chair alarm activated, Side rails x 3, and bed boot on RLE, bed to modified chair position    COMMUNICATION/EDUCATION:   The patients plan of care was discussed with: Registered nurse. Fall prevention education was provided and the patient/caregiver indicated understanding., Patient/family have participated as able in goal setting and plan of care. , and Patient/family agree to work toward stated goals and plan of care.     Thank you for this referral.  Aleisha Shadow   Time Calculation: 28 mins

## 2021-11-21 NOTE — PROGRESS NOTES
[facility] Adult  Hospitalist Group                                                                                          Hospitalist Progress Note  Lyla Arango MD  Answering service: [phone] from in house phone        Date of Service:  2021  NAME:  Lorena Andres. :  1950  MRN:  [id number]      Admission Summary:   Odette Benson Jr. is a 66 y. o. male with hx of orthostatic hypotension, ESRD on HD, Blindness, cad who presents to hospital from LTCF for evaluation of left foot infection. Pt states he has had pain in his left foot for the last month. Unable to describe any additional changes due to his poor eye-sight. States he has otherwise been doing and feeling well. The patient denies any fever, chills, chest or abdominal pain, nausea, vomiting, cough, congestion, recent illness, palpitations, or dysuria. Remarkable vitals on ER Presentations: unremarkable  Labs Remarkable for: wbc 20.1, hgb 10.4, crp 18.90, esr 41,   ER Images: CT LLE: Suboptimal examination secondary to motion artifact. No definite bony  destruction is evident. Diffuse osteopenia. Diffuse soft tissue swelling in the  left foot. Interval history / Subjective:      Chart reviewed. Patient examined. His POA Ms. Tyler Hampton was visiting. Patient complained of tightness on the left AKA wound otherwise no complaints. Assessment & Plan:     Osteomyelitis,  Left Foot w/ Gagrene of LLE 3rd et 4th Digit (POA)   - S/PLeft AKA ()  - Wound culture prior to AKA grew light mssa and heavy mixed enetric GNR. Blood cx negative. D/cd vancomycin. Continue zosyn, WBC up today.  -Hold eliquis, asa and plavix. Will check with vascular surgery if okay to resume tomorrow  - Vascular surgery following       Bleeding from the left AKA site  --Resolved.     Acute post op blood loss anemia on top of chronic anemia of chronic disease  Baseline HB~10  --HB 7.1 after 2 units,Transfuse for Hb<8 ,additional one unit ordered     ESRD on HD  -Nephro following for HD management   - Patient is TTS schedule      CAD  -Hold eliquis, asa and plavix,resume when okay by surgery     NIDDM II w/ Diabetic Neuropathy  -SSI + Hypoglycemic protocols  -Accuchecks q6 while npo     Orthostatic Hypotension likely worsened post due to acute blood loss. BP better this morning  -Home midrodine and fludrocortisone  -received a unit of blood. albumin as needed  -BP stable,SBp>100    Hypokalemia,K 2. 5. Intermittently tachycardiac,frequent PVCs. Rhythm appears a flutter on monitor,frequent PCVs.  --Potassium corrected, 3.6 today. Difficult access. left subclavian central line placed by IR on 11/19     Code status: Full   DVT prophylaxis:     Care Plan discussed with: Patient/Family and Nurse  Anticipated Disposition: Return to facilty   Anticipated Discharge: 24 hours to 48 hours     Hospital Problems  Date Reviewed: 9/1/2021          Codes Class Noted POA    Gangrene of left foot ([facility] ICD-10-CM: K69  ICD-9-CM: 785.4  11/14/2021 Unknown                Review of Systems:   A comprehensive review of systems was negative except for that written in the HPI. Vital Signs:    Last 24hrs VS reviewed since prior progress note. Most recent are:  Visit Vitals  /73 (BP 1 Location: Right upper arm, BP Patient Position: Semi fowlers)   Pulse 99   Temp 98.4 °F (36.9 °C)   Resp 18   Wt 89 kg (196 lb 3.4 oz)   SpO2 100%   BMI 28.98 kg/m²       No intake or output data in the 24 hours ending 11/21/21 1836     Physical Examination:             Constitutional:  Alert and oriented x4. Blind   ENT:  Oral mucosa moist, oropharynx benign. Resp:  CTA bilaterally. No wheezing/rhonchi/rales. No accessory muscle use   CV:  Regular rhythm, normal rate, no murmurs, gallops, rubs    GI:  Soft, non distended, non tender. normoactive bowel sounds,    Musculoskeletal:  No edema, warm, 2+ pulses throughout. Left AKA bandaged.     Neurologic:  AAOx3, CN II-XII reviewed. Blind. Moves all extremities. Psych:  Good insight, Not anxious nor agitated. Data Review:    Review and/or order of clinical lab test  Review and/or order of tests in the medicine section of [facility]      Labs:     Recent Labs     11/21/21  0610 11/20/21  1207 11/20/21  0516 11/20/21  0516   WBC 15.3*  --   --  10.4   HGB 8.3* 8.0*   < > 7.1*   HCT 27.1* 26.3*   < > 23.2*     --   --  208    < > = values in this interval not displayed. Recent Labs     11/21/21  0610 11/20/21  1207 11/20/21  0516 11/19/21  0816 11/19/21  0816    142 143   < > 138   K 3.6 3.5 2.5*   < > 2.9*    105 112*   < > 106   CO2 31 29 23   < > 29   BUN 8 17 14   < > 12   CREA 2.67* 4.11* 3.40*   < > 3.06*   * 160* 117*   < > 127*   CA 8.0* 7.5* 6.9*   < > 8.0*   MG  --  1.7 1.6  --   --    PHOS 2.2*  --  3.6  --  3.7    < > = values in this interval not displayed. Recent Labs     11/21/21  0610 11/20/21  1207 11/20/21  0516   ALT  --  35  --    AP  --  67  --    TBILI  --  0.4  --    TP  --  4.8*  --    ALB 2.1* 2.4* 2.0*   GLOB  --  2.4  --      Recent Labs     11/19/21 1951   INR 1.3*   PTP 13.4*   APTT 45.7*      No results for input(s): FE, TIBC, PSAT, FERR in the last 72 hours. No results found for: FOL, RBCF   No results for input(s): PH, PCO2, PO2 in the last 72 hours. No results for input(s): CPK, CKNDX, TROIQ in the last 72 hours.     No lab exists for component: CPKMB  Lab Results   Component Value Date/Time    Cholesterol, total 189 08/12/2011 10:06 AM    HDL Cholesterol 37 (L) 08/12/2011 10:06 AM    LDL, calculated 127 (H) 08/12/2011 10:06 AM    Triglyceride 125 08/12/2011 10:06 AM     Lab Results   Component Value Date/Time    Glucose (POC) 119 (H) 11/21/2021 04:53 PM    Glucose (POC) 122 (H) 11/21/2021 12:47 PM    Glucose (POC) 203 (H) 11/21/2021 09:08 AM    Glucose (POC) 123 (H) 11/20/2021 04:50 PM    Glucose (POC) 135 (H) 11/20/2021 11:39 AM     No results found for: COLOR, APPRN, SPGRU, REFSG, JOYA, PROTU, GLUCU, KETU, BILU, UROU, LAWRENCE, LEUKU, GLUKE, EPSU, BACTU, WBCU, RBCU, CASTS, UCRY      Medications Reviewed:     Current Facility-Administered Medications   Medication Dose Route Frequency    0.9% sodium chloride infusion 250 mL  250 mL IntraVENous PRN    albumin human 25% (BUMINATE) solution 25 g  25 g IntraVENous QID PRN    balsam peru-castor oiL (VENELEX) ointment   Topical TID    oxyCODONE IR (ROXICODONE) tablet 10 mg  10 mg Oral Q4H PRN    0.9% sodium chloride infusion 250 mL  250 mL IntraVENous PRN    midodrine (PROAMATINE) tablet 20 mg  20 mg Oral Q8H    fludrocortisone (FLORINEF) tablet 0.1 mg  0.1 mg Oral BID    0.9% sodium chloride infusion 250 mL  250 mL IntraVENous PRN    gelatin sponge,absorb-porcine (GELFOAM) 100 sponge 1 Each  1 Each Topical PRN    fentaNYL citrate (PF) injection 50 mcg  50 mcg IntraVENous Q3H PRN    0.9% sodium chloride infusion 250 mL  250 mL IntraVENous PRN    traMADoL (ULTRAM) tablet 50 mg  50 mg Oral Q6H PRN    epoetin nabila-epbx (RETACRIT) injection 20,000 Units  20,000 Units SubCUTAneous Q TUE, THU & SAT    atorvastatin (LIPITOR) tablet 20 mg  20 mg Oral QHS    pantoprazole (PROTONIX) tablet 20 mg  20 mg Oral DAILY    pregabalin (LYRICA) capsule 25 mg  25 mg Oral DAILY    [Held by provider] tiZANidine (ZANAFLEX) tablet 2 mg  2 mg Oral BID    piperacillin-tazobactam (ZOSYN) 3.375 g in 0.9% sodium chloride (MBP/ADV) 100 mL MBP  3.375 g IntraVENous Q12H    insulin regular (NOVOLIN R, HUMULIN R) injection   SubCUTAneous TIDAC    glucose chewable tablet 16 g  4 Tablet Oral PRN    dextrose (D50W) injection syrg 12.5-25 g  12.5-25 g IntraVENous PRN    glucagon (GLUCAGEN) injection 1 mg  1 mg IntraMUSCular PRN     ______________________________________________________________________  EXPECTED LENGTH OF STAY: 10d 9h  ACTUAL LENGTH OF STAY:          7                 Douglas Vilchis MD

## 2021-11-22 NOTE — PROGRESS NOTES
[facility]  Hospitalist Group                                                                                          Hospitalist Progress Note  Shree Pritchard MD  Answering service: [phone] from in house phone        Date of Service:  2021  NAME:  Kwaku Thurman. :  1950  MRN:  [id number]      Admission Summary:   Jesse Adler Jr. is a 66 y. o. male with hx of orthostatic hypotension, ESRD on HD, Blindness, cad who presents to hospital from LTCF for evaluation of left foot infection. Pt states he has had pain in his left foot for the last month. Unable to describe any additional changes due to his poor eye-sight. States he has otherwise been doing and feeling well. The patient denies any fever, chills, chest or abdominal pain, nausea, vomiting, cough, congestion, recent illness, palpitations, or dysuria. Remarkable vitals on ER Presentations: unremarkable  Labs Remarkable for: wbc 20.1, hgb 10.4, crp 18.90, esr 41,   ER Images: CT LLE: Suboptimal examination secondary to motion artifact. No definite bony  destruction is evident. Diffuse osteopenia. Diffuse soft tissue swelling in the  left foot. Interval history / Subjective:      Chart reviewed. Patient examined. Patient has no complaints. Denied dizziness or chest pressure/pain. Asked him about blood thinners, he is not sure about Eliquis or Plavix but he said he takes baby aspirin. Vascular surgery okay to resume antiplatelet/AC      Assessment & Plan:     Osteomyelitis,  Left Foot w/ Gagrene of LLE 3rd et 4th Digit (POA)   - S/PLeft AKA ()  - Wound culture prior to AKA grew light mssa and heavy mixed enetric GNR. Blood cx negative. received vancomycin and Zosyn, discontinued. - Vascular surgery following       Bleeding from the left AKA site  --Resolved.     Acute post op blood loss anemia on top of chronic anemia of chronic disease  Baseline HB~10  --HB stable, 7.8 today s/p 2 units PRBC,Transfuse for Hb<8 ,additional one unit ordered     ESRD on HD  -Nephro following for HD management   - Patient is TTS schedule      CAD  -Resume Eliquis and antiplatelets, vascular surgery okay.     NIDDM II w/ Diabetic Neuropathy  -SSI + Hypoglycemic protocols  -Accuchecks q6 while npo     Orthostatic Hypotension likely worsened post due to acute blood loss. BP better this morning  -Blood pressure stable > 100, Home midrodine and fludrocortisone  -received a unit of blood. albumin as needed      On 11/20 hypokalemia,K 2. 5. Intermittently tachycardiac,frequent PVCs. Rhythm appears a flutter on monitor,frequent PCVs.  Treated with IV potassium  --Monitor and replete    Difficult access. left subclavian central line placed by IR on 11/19     Code status: Full   DVT prophylaxis:     Care Plan discussed with: Patient/Family and Nurse  Anticipated Disposition: Return to facilty   Anticipated Discharge: 24 hours to 48 hours     Hospital Problems  Date Reviewed: 9/1/2021          Codes Class Noted POA    Gangrene of left foot ([facility] [address] ICD-10-CM: L13  ICD-9-CM: 785.4  11/14/2021 Unknown                Review of Systems:   A comprehensive review of systems was negative except for that written in the HPI. Vital Signs:    Last 24hrs VS reviewed since prior progress note. Most recent are:  Visit Vitals  /60 (BP 1 Location: Right arm, BP Patient Position: At rest)   Pulse 90   Temp 98 °F (36.7 °C)   Resp 18   Wt 89 kg (196 lb 3.4 oz)   SpO2 100%   BMI 28.98 kg/m²       No intake or output data in the 24 hours ending 11/22/21 1651     Physical Examination:             Constitutional:  Alert and oriented x4. Blind   ENT:  Oral mucosa moist, oropharynx benign. Resp:  CTA bilaterally. No wheezing/rhonchi/rales. No accessory muscle use   CV:  Regular rhythm, normal rate, no murmurs, gallops, rubs    GI:  Soft, non distended, non tender. normoactive bowel sounds,    Musculoskeletal:  No edema, warm, 2+ pulses throughout. Left AKA bandaged. Neurologic:  AAOx3, CN II-XII reviewed. Blind. Moves all extremities. Psych:  Good insight, Not anxious nor agitated. Data Review:    Review and/or order of clinical lab test  Review and/or order of tests in the medicine section of [facility]      Labs:     Recent Labs     11/22/21  0545 11/21/21  0610   WBC 13.4* 15.3*   HGB 7.8* 8.3*   HCT 25.5* 27.1*    295     Recent Labs     11/22/21  0545 11/21/21  0610 11/20/21  1207 11/20/21  0516 11/20/21  0516    140 142   < > 143   K 3.3* 3.6 3.5   < > 2.5*    106 105   < > 112*   CO2 30 31 29   < > 23   BUN 11 8 17   < > 14   CREA 3.68* 2.67* 4.11*   < > 3.40*   * 181* 160*   < > 117*   CA 8.0* 8.0* 7.5*   < > 6.9*   MG  --   --  1.7  --  1.6   PHOS  --  2.2*  --   --  3.6    < > = values in this interval not displayed. Recent Labs     11/21/21  0610 11/20/21  1207 11/20/21  0516   ALT  --  35  --    AP  --  67  --    TBILI  --  0.4  --    TP  --  4.8*  --    ALB 2.1* 2.4* 2.0*   GLOB  --  2.4  --      Recent Labs     11/19/21 1951   INR 1.3*   PTP 13.4*   APTT 45.7*      No results for input(s): FE, TIBC, PSAT, FERR in the last 72 hours. No results found for: FOL, RBCF   No results for input(s): PH, PCO2, PO2 in the last 72 hours. No results for input(s): CPK, CKNDX, TROIQ in the last 72 hours.     No lab exists for component: CPKMB  Lab Results   Component Value Date/Time    Cholesterol, total 189 08/12/2011 10:06 AM    HDL Cholesterol 37 (L) 08/12/2011 10:06 AM    LDL, calculated 127 (H) 08/12/2011 10:06 AM    Triglyceride 125 08/12/2011 10:06 AM     Lab Results   Component Value Date/Time    Glucose (POC) 173 (H) 11/22/2021 04:28 PM    Glucose (POC) 126 (H) 11/22/2021 12:34 PM    Glucose (POC) 151 (H) 11/22/2021 08:02 AM    Glucose (POC) 119 (H) 11/21/2021 04:53 PM    Glucose (POC) 122 (H) 11/21/2021 12:47 PM     No results found for: COLOR, APPRN, SPGRU, REFSG, JOYA, PROTU, GLUCU, KETU, BILU, UROU, LAWRENCE, Thomas Hairston, EPSU, BACTU, WBCU, RBCU, CASTS, UCRY      Medications Reviewed:     Current Facility-Administered Medications   Medication Dose Route Frequency    0.9% sodium chloride infusion 250 mL  250 mL IntraVENous PRN    albumin human 25% (BUMINATE) solution 25 g  25 g IntraVENous QID PRN    balsam peru-castor oiL (VENELEX) ointment   Topical TID    oxyCODONE IR (ROXICODONE) tablet 10 mg  10 mg Oral Q4H PRN    0.9% sodium chloride infusion 250 mL  250 mL IntraVENous PRN    midodrine (PROAMATINE) tablet 20 mg  20 mg Oral Q8H    fludrocortisone (FLORINEF) tablet 0.1 mg  0.1 mg Oral BID    0.9% sodium chloride infusion 250 mL  250 mL IntraVENous PRN    gelatin sponge,absorb-porcine (GELFOAM) 100 sponge 1 Each  1 Each Topical PRN    fentaNYL citrate (PF) injection 50 mcg  50 mcg IntraVENous Q3H PRN    0.9% sodium chloride infusion 250 mL  250 mL IntraVENous PRN    traMADoL (ULTRAM) tablet 50 mg  50 mg Oral Q6H PRN    epoetin nabila-epbx (RETACRIT) injection 20,000 Units  20,000 Units SubCUTAneous Q TUE, THU & SAT    atorvastatin (LIPITOR) tablet 20 mg  20 mg Oral QHS    pantoprazole (PROTONIX) tablet 20 mg  20 mg Oral DAILY    pregabalin (LYRICA) capsule 25 mg  25 mg Oral DAILY    [Held by provider] tiZANidine (ZANAFLEX) tablet 2 mg  2 mg Oral BID    piperacillin-tazobactam (ZOSYN) 3.375 g in 0.9% sodium chloride (MBP/ADV) 100 mL MBP  3.375 g IntraVENous Q12H    insulin regular (NOVOLIN R, HUMULIN R) injection   SubCUTAneous TIDAC    glucose chewable tablet 16 g  4 Tablet Oral PRN    dextrose (D50W) injection syrg 12.5-25 g  12.5-25 g IntraVENous PRN    glucagon (GLUCAGEN) injection 1 mg  1 mg IntraMUSCular PRN     ______________________________________________________________________  EXPECTED LENGTH OF STAY: 10d 9h  ACTUAL LENGTH OF STAY:          8                 Elizabeth Verdin MD

## 2021-11-22 NOTE — PROGRESS NOTES
Spiritual Care Assessment/Progress Note  ST. [address]      NAME: Graham Valencia. MRN: [id number]  AGE: 66 y.o. SEX: male  Protestant Affiliation: Alevism   Language: English     11/22/2021     Total Time (in minutes): 16     Spiritual Assessment begun in [facility] [place] [facility] through conversation with:         [x]Patient        [] Family    [] Friend(s)        Reason for Consult: Initial/Spiritual assessment, patient floor     Spiritual beliefs: (Please include comment if needed)     [x] Identifies with a pollo tradition: Alevism       [] Supported by a pollo community:            [] Claims no spiritual orientation:           [] Seeking spiritual identity:                [] Adheres to an individual form of spirituality:           [] Not able to assess:                           Identified resources for coping:      [] Prayer                               [] Music                  [] Guided Imagery     [x] Family/friends                 [] Pet visits     [] Devotional reading                         [] Unknown     [] Other:                                               Interventions offered during this visit: (See comments for more details)    Patient Interventions: Affirmation of pollo, Affirmation of emotions/emotional suffering           Plan of Care:     [x] Support spiritual and/or cultural needs    [] Support AMD and/or advance care planning process      [] Support grieving process   [] Coordinate Rites and/or Rituals    [] Coordination with community clergy   [] No spiritual needs identified at this time   [] Detailed Plan of Care below (See Comments)  [] Make referral to Music Therapy  [] Make referral to Pet Therapy     [] Make referral to Addiction services  [] Make referral to [facility]  [] Make referral to Spiritual Care Partner  [] No future visits requested        [x] Contact Spiritual Care for further referrals     Comments: Visit for Spiritual assessment in Room 425.   Patient is blind and was alone present during the visit. Patient shared about his membership at [facility] on [address]. 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 No spiritual needs noted at this time. Provided ministry of presence and empathic listening. Advised of  availability. Visited by: Lee Laboy.  Teetee Nolan, [address] [place] [address] Road paging Service [phone]

## 2021-11-22 NOTE — PROGRESS NOTES
[facility]   [address] [address], [facility]  Phone: [phone]   [phone]       Nephrology Progress Note  Adenike Gray.     1950     [id number]  Date of Admission : 11/13/2021 11/22/21    CC: Follow up for esrd       Assessment and Plan   ESRD- HD  - dialyzes TTS at 3 chopt   - Midodrine and Florinef prior to HD  - has Lft thigh AVG for access  - HD tomorrow per routine    Hypokalemia  - 4 k bath    Left foot Wet gangrene:   - s/p AKA 11/16    Anemia in CKD  Blood loss anemia   - f/u H/h  - continue SUNNY  -Transfuse PRN for Hb < 8    Sec HPTH   - continue home binders     Chronic Hypotension   - continue Midodrine and Florinef      Interval History:  Seen and examined. Tolerated HD over the weekend. No cp, sob, n/v/d reported. Pain controlled. Review of Systems: A comprehensive review of systems was negative except for that written in the HPI.     Current Medications:   Current Facility-Administered Medications   Medication Dose Route Frequency    0.9% sodium chloride infusion 250 mL  250 mL IntraVENous PRN    albumin human 25% (BUMINATE) solution 25 g  25 g IntraVENous QID PRN    balsam peru-castor oiL (VENELEX) ointment   Topical TID    oxyCODONE IR (ROXICODONE) tablet 10 mg  10 mg Oral Q4H PRN    0.9% sodium chloride infusion 250 mL  250 mL IntraVENous PRN    midodrine (PROAMATINE) tablet 20 mg  20 mg Oral Q8H    fludrocortisone (FLORINEF) tablet 0.1 mg  0.1 mg Oral BID    0.9% sodium chloride infusion 250 mL  250 mL IntraVENous PRN    gelatin sponge,absorb-porcine (GELFOAM) 100 sponge 1 Each  1 Each Topical PRN    fentaNYL citrate (PF) injection 50 mcg  50 mcg IntraVENous Q3H PRN    0.9% sodium chloride infusion 250 mL  250 mL IntraVENous PRN    traMADoL (ULTRAM) tablet 50 mg  50 mg Oral Q6H PRN    epoetin nabila-epbx (RETACRIT) injection 20,000 Units  20,000 Units SubCUTAneous Q TUE, THU & SAT    atorvastatin (LIPITOR) tablet 20 mg  20 mg Oral QHS    pantoprazole (PROTONIX) tablet 20 mg  20 mg Oral DAILY    pregabalin (LYRICA) capsule 25 mg  25 mg Oral DAILY    [Held by provider] tiZANidine (ZANAFLEX) tablet 2 mg  2 mg Oral BID    piperacillin-tazobactam (ZOSYN) 3.375 g in 0.9% sodium chloride (MBP/ADV) 100 mL MBP  3.375 g IntraVENous Q12H    insulin regular (NOVOLIN R, HUMULIN R) injection   SubCUTAneous TIDAC    glucose chewable tablet 16 g  4 Tablet Oral PRN    dextrose (D50W) injection syrg 12.5-25 g  12.5-25 g IntraVENous PRN    glucagon (GLUCAGEN) injection 1 mg  1 mg IntraMUSCular PRN      Allergies   Allergen Reactions    Adhesive Tape-Silicones Itching     Itchiness        Objective:  Vitals:    Vitals:    11/21/21 2340 11/22/21 0201 11/22/21 0403 11/22/21 0556   BP: 108/70  133/64    Pulse: (!) 103 92 89 85   Resp: 20  14    Temp: 98 °F (36.7 °C)  97.6 °F (36.4 °C)    TempSrc:       SpO2: 95%  94%    Weight:         Intake and Output:  No intake/output data recorded. 11/20 1901 - 11/22 0700  In: 26 [P.O.:440]  Out: -     Physical Examination:  GEN: NAD,Blind  NECK- Supple, no mass  RESP: No wheezing,CLEAR b/l  CVS: S1,S2  RRR  NEURO: Normal speech, NON FOCAL  EXT: Left AKA, bleeding stump    []    High complexity decision making was performed  []    Patient is at high-risk of decompensation with multiple organ involvement    Lab Data Personally Reviewed: I have reviewed all the pertinent labs, microbiology data and radiology studies during assessment.     Recent Labs     11/22/21  0545 11/21/21  0610 11/20/21  1207 11/20/21  0516 11/19/21  1951    140 142 143  --    K 3.3* 3.6 3.5 2.5*  --     106 105 112*  --    CO2 30 31 29 23  --    * 181* 160* 117*  --    BUN 11 8 17 14  --    CREA 3.68* 2.67* 4.11* 3.40*  --    CA 8.0* 8.0* 7.5* 6.9*  --    MG  --   --  1.7 1.6  --    PHOS  --  2.2*  --  3.6  --    ALB  --  2.1* 2.4* 2.0*  --    ALT  --   --  35  --   --    INR  --   --   --   --  1.3*     Recent Labs 11/22/21  0545 11/21/21  0610 11/20/21  1207 11/20/21  0516 11/19/21 1951   WBC 13.4* 15.3*  --  10.4 13.1*   HGB 7.8* 8.3* 8.0* 7.1* 8.3*   HCT 25.5* 27.1* 26.3* 23.2* 27.2*    295  --  208 263     No results found for: SDES  Lab Results   Component Value Date/Time    Culture result: LIGHT STAPHYLOCOCCUS AUREUS (A) 11/14/2021 01:54 AM    Culture result: HEAVY MIXED ENTERIC GRAM NEGATIVE RODS (A) 11/14/2021 01:54 AM    Culture result: NO GROWTH 5 DAYS 11/13/2021 11:55 PM     Recent Results (from the past 24 hour(s))   GLUCOSE, POC    Collection Time: 11/21/21 12:47 PM   Result Value Ref Range    Glucose (POC) 122 (H) 65 - 117 mg/dL    Performed by [facility], POC    Collection Time: 11/21/21  4:53 PM   Result Value Ref Range    Glucose (POC) 119 (H) 65 - 117 mg/dL    Performed by [address] [place] [address], BASIC    Collection Time: 11/22/21  5:45 AM   Result Value Ref Range    Sodium 140 136 - 145 mmol/L    Potassium 3.3 (L) 3.5 - 5.1 mmol/L    Chloride 106 97 - 108 mmol/L    CO2 30 21 - 32 mmol/L    Anion gap 4 (L) 5 - 15 mmol/L    Glucose 175 (H) 65 - 100 mg/dL    BUN 11 6 - 20 MG/DL    Creatinine 3.68 (H) 0.70 - 1.30 MG/DL    BUN/Creatinine ratio 3 (L) 12 - 20      GFR est AA 20 (L) >60 ml/min/1.73m2    GFR est non-AA 16 (L) >60 ml/min/1.73m2    Calcium 8.0 (L) 8.5 - 10.1 MG/DL   CBC WITH AUTOMATED DIFF    Collection Time: 11/22/21  5:45 AM   Result Value Ref Range    WBC 13.4 (H) 4.1 - 11.1 K/uL    RBC 2.64 (L) 4.10 - 5.70 M/uL    HGB 7.8 (L) 12.1 - 17.0 g/dL    HCT 25.5 (L) 36.6 - 50.3 %    MCV 96.6 80.0 - 99.0 FL    MCH 29.5 26.0 - 34.0 PG    MCHC 30.6 30.0 - 36.5 g/dL    RDW 16.9 (H) 11.5 - 14.5 %    PLATELET 055 428 - 703 K/uL    MPV 9.3 8.9 - 12.9 FL    NRBC 1.1 (H) 0  WBC    ABSOLUTE NRBC 0.15 (H) 0.00 - 0.01 K/uL    NEUTROPHILS 80 (H) 32 - 75 %    LYMPHOCYTES 9 (L) 12 - 49 %    MONOCYTES 5 5 - 13 %    EOSINOPHILS 3 0 - 7 %    BASOPHILS 1 0 - 1 %    IMMATURE GRANULOCYTES 2 (H) 0.0 - 0.5 %    ABS. NEUTROPHILS 10.7 (H) 1.8 - 8.0 K/UL    ABS. LYMPHOCYTES 1.2 0.8 - 3.5 K/UL    ABS. MONOCYTES 0.7 0.0 - 1.0 K/UL    ABS. EOSINOPHILS 0.4 0.0 - 0.4 K/UL    ABS. BASOPHILS 0.1 0.0 - 0.1 K/UL    ABS. IMM. GRANS. 0.3 (H) 0.00 - 0.04 K/UL    DF SMEAR SCANNED      RBC COMMENTS ANISOCYTOSIS  1+        RBC COMMENTS MACROCYTOSIS  1+       GLUCOSE, POC    Collection Time: 11/22/21  8:02 AM   Result Value Ref Range    Glucose (POC) 151 (H) 65 - 117 mg/dL    Performed by Dwayne Roach            Total time spent with patient:  xxx   min. Care Plan discussed with:  Patient     Family      RN      Consulting Physician [address],         I have reviewed the flowsheets. Chart and Pertinent Notes have been reviewed. No change in PMH ,family and social history from Consult note.       Rylan Bell MD

## 2021-11-23 NOTE — PROGRESS NOTES
Comprehensive Nutrition Assessment    Type and Reason for Visit: Initial, RD nutrition re-screen/LOS    Nutrition Recommendations/Plan:    1. Remove K restriction from diet     2. Continue to monitor and replete K as needed    Nutrition Assessment:    Pt admitted fro gangrene of his left foot. Hx includes orthostatic hypotension, ESRD on HD, blindness, T2 DM, and CAD. Pt had a successful left AKA on 11/16. Pt has been consistently hypokalemic, being repleted. Spoke with pt at beside while he was eating lunch. Female friend was present who brought [facility]. He and the nurse both reported very good appetite. Intake is not accurately reported in intake hx below. Pt reported losing a couple of lbs in the weeks leading up to his hospitalization, but reports no major concerns. Pt requested chicken noodle soup. Malnutrition Assessment:  Malnutrition Status:  No malnutrition    Context:  Acute illness       Nutritionally Significant Medications: Novolin R, Humulin R, midodrine, protonix, lyrica    Estimated Daily Nutrient Needs:  Energy (kcal): 1950 (MSJ x 1.2); Weight Used for Energy Requirements: Current (89 kg)  Protein (g):  (1.1-1.3 g/kg);  Weight Used for Protein Requirements: Current (89 kg)  Fluid (ml/day): 1900; Method Used for Fluid Requirements: 1 ml/kcal    Nutrition Related Findings:       BM: 11/22- soft  Edema: none documented   Wounds:  Surgical incision       Current Nutrition Therapies:   Diet: carb restriction (60 gm/meal), low potassium(<3000 mg/day, low phosphorus (<1000 mg)  Supplements: none  Additional Caloric Sources: none    Meal intake:   Patient Vitals for the past 168 hrs:   % Diet Eaten   11/21/21 1200 1 - 25%   11/21/21 0930 1 - 25%     Anthropometric Measures:  · Height:  5' 9\" (175.3 cm)  · Current Body Wt:  89 kg (196 lb 3.4 oz)   · Admission Body Wt:       · Usual Body Wt:        · Ideal Body Wt:  160:  122.6 %   · Adjusted Body Weight:  216; Weight Adjustment for: Amputation   · Adjusted BMI:  31.9    · BMI Categories:  Overweight (BMI 25.0-29. 9)     Wt Readings from Last 10 Encounters:   09/01/21 115.7 kg (255 lb)   05/06/21 90.7 kg (200 lb)   10/30/20 90.9 kg (200 lb 6.4 oz)   09/25/20 91.9 kg (202 lb 9.6 oz)   03/11/20 96.6 kg (213 lb)   03/10/20 98.9 kg (218 lb)   02/01/19 98.9 kg (218 lb)   11/30/12 113.9 kg (251 lb 1.7 oz)   08/17/12 119.3 kg (263 lb)   06/29/12 116.6 kg (257 lb)     Last 3 Recorded Weights in this Encounter    11/16/21 1516   Weight: 89 kg (196 lb 3.4 oz)     Nutrition Diagnosis:   · Increased nutrient needs related to increased demand for energy/nutrients as evidenced by dialysis, wounds    Nutrition Interventions:   Food and/or Nutrient Delivery: Continue current diet  Nutrition Education and Counseling: No recommendations at this time  Coordination of Nutrition Care: Continue to monitor while inpatient    Goals:  PO intake of at least 75% of meals over 5-7 days. Nutrition Monitoring and Evaluation:   Behavioral-Environmental Outcomes: None identified  Food/Nutrient Intake Outcomes: Food and nutrient intake  Physical Signs/Symptoms Outcomes: Skin, Meal time behavior    Discharge Planning:     Too soon to determine     Collinsus Colin Dick, Dietetic Intern)

## 2021-11-23 NOTE — PROGRESS NOTES
[address] [facility] Adult  Hospitalist Group                                                                                          Hospitalist Progress Note  Douglas Vilchis MD  Answering service: [phone] from in house phone        Date of Service:  2021  NAME:  Karan Chilel. :  1950  MRN:  [id number]      Admission Summary:   Kaylee Baeza Jr. is a 66 y. o. male with hx of orthostatic hypotension, ESRD on HD, Blindness, cad who presents to hospital from [facility]TC[facility] for evaluation of left foot infection. Pt states he has had pain in his left foot for the last month. Unable to describe any additional changes due to his poor eye-sight. States he has otherwise been doing and feeling well. The patient denies any fever, chills, chest or abdominal pain, nausea, vomiting, cough, congestion, recent illness, palpitations, or dysuria. Remarkable vitals on ER Presentations: unremarkable  Labs Remarkable for: wbc 20.1, hgb 10.4, crp 18.90, esr 41,   ER Images: CT LLE: Suboptimal examination secondary to motion artifact. No definite bony  destruction is evident. Diffuse osteopenia. Diffuse soft tissue swelling in the  left foot. Interval history / Subjective:      Chart reviewed. Patient examined. --And has no complaints, his friend/MPOA Ms. Reyna Smith in the room. Assessment & Plan:     Osteomyelitis,  Left Foot w/ Gagrene of LLE 3rd et 4th Digit (POA)   - S/PLeft AKA ()  - Wound culture prior to AKA grew light mssa and heavy mixed enetric GNR. Blood cx negative. received vancomycin and Zosyn, discontinued. - Vascular surgery following   -WBC up, afebrile. Monitor      Bleeding from the left AKA site  --Resolved.     Acute post op blood loss anemia on top of chronic anemia of chronic disease  Baseline HB~10  --HB stable, 7.8 today s/p 2 units PRBC,Transfuse for Hb<8 ,additional one unit ordered     ESRD on HD  -Nephro following for HD management   - Patient is TTS schedule      CAD  -Resume Eliquis and antiplatelets, vascular surgery okay.     NIDDM II w/ Diabetic Neuropathy  -SSI + Hypoglycemic protocols  -Accuchecks q6 while npo     Orthostatic Hypotension likely worsened post due to acute blood loss. BP better this morning  -Blood pressure stable > 100, Home midrodine and fludrocortisone  -received a unit of blood. albumin as needed      On 11/20 hypokalemia,K 2. 5. Intermittently tachycardiac,frequent PVCs. Rhythm appears a flutter on monitor,frequent PCVs.  Treated with IV potassium  --Monitor and replete    Difficult access. left subclavian central line placed by IR on 11/19     Code status: Full   DVT prophylaxis:     Care Plan discussed with: Patient/Family and Nurse  Anticipated Disposition: Return to facilty   Anticipated Discharge: 24 hours to 48 hours     Hospital Problems  Date Reviewed: 9/1/2021          Codes Class Noted POA    Gangrene of left foot ([facility] [address] ICD-10-CM: N55  ICD-9-CM: 785.4  11/14/2021 Unknown                Review of Systems:   A comprehensive review of systems was negative except for that written in the HPI. Vital Signs:    Last 24hrs VS reviewed since prior progress note. Most recent are:  Visit Vitals  BP (!) 113/50 (BP 1 Location: Right upper arm, BP Patient Position: At rest)   Pulse (!) 104   Temp 98.6 °F (37 °C)   Resp 23   Ht 5' 9\" (1.753 m)   Wt 89.3 kg (196 lb 13.9 oz)   SpO2 100%   BMI 29.07 kg/m²         Intake/Output Summary (Last 24 hours) at 11/23/2021 1806  Last data filed at 11/23/2021 1115  Gross per 24 hour   Intake    Output 1000 ml   Net -1000 ml        Physical Examination:             Constitutional:  Alert and oriented x4. Blind   ENT:  Oral mucosa moist, oropharynx benign. Resp:  CTA bilaterally. No wheezing/rhonchi/rales. No accessory muscle use   CV:  Regular rhythm, normal rate, no murmurs, gallops, rubs    GI:  Soft, non distended, non tender.  normoactive bowel sounds,    Musculoskeletal:  No edema, warm, 2+ pulses throughout. Left AKA bandaged. Neurologic:  AAOx3, CN II-XII reviewed. Blind. Moves all extremities. Psych:  Good insight, Not anxious nor agitated. Data Review:    Review and/or order of clinical lab test  Review and/or order of tests in the medicine section of [facility]      Labs:     Recent Labs     11/23/21 0324 11/22/21  0545   WBC 15.5* 13.4*   HGB 7.7* 7.8*   HCT 26.5* 25.5*    273     Recent Labs     11/23/21  0324 11/22/21  0545 11/21/21  0610    140 140   K 3.3* 3.3* 3.6    106 106   CO2 28 30 31   BUN 15 11 8   CREA 4.48* 3.68* 2.67*   * 175* 181*   CA 8.1* 8.0* 8.0*   PHOS  --   --  2.2*     Recent Labs     11/21/21  0610   ALB 2.1*     No results for input(s): INR, PTP, APTT, INREXT, INREXT in the last 72 hours. No results for input(s): FE, TIBC, PSAT, FERR in the last 72 hours. No results found for: FOL, RBCF   No results for input(s): PH, PCO2, PO2 in the last 72 hours. No results for input(s): CPK, CKNDX, TROIQ in the last 72 hours.     No lab exists for component: CPKMB  Lab Results   Component Value Date/Time    Cholesterol, total 189 08/12/2011 10:06 AM    HDL Cholesterol 37 (L) 08/12/2011 10:06 AM    LDL, calculated 127 (H) 08/12/2011 10:06 AM    Triglyceride 125 08/12/2011 10:06 AM     Lab Results   Component Value Date/Time    Glucose (POC) 259 (H) 11/23/2021 04:41 PM    Glucose (POC) 91 11/23/2021 11:40 AM    Glucose (POC) 114 11/23/2021 08:19 AM    Glucose (POC) 221 (H) 11/22/2021 05:19 PM    Glucose (POC) 173 (H) 11/22/2021 04:28 PM     No results found for: COLOR, APPRN, SPGRU, REFSG, JOYA, PROTU, GLUCU, KETU, BILU, UROU, LAWRENCE, LEUKU, GLUKE, EPSU, BACTU, WBCU, RBCU, CASTS, UCRY      Medications Reviewed:     Current Facility-Administered Medications   Medication Dose Route Frequency    apixaban (ELIQUIS) tablet 5 mg  5 mg Oral BID    0.9% sodium chloride infusion 250 mL  250 mL IntraVENous PRN    albumin human 25% (BUMINATE) solution 25 g  25 g IntraVENous QID PRN    balsam peru-castor oiL (VENELEX) ointment   Topical TID    oxyCODONE IR (ROXICODONE) tablet 10 mg  10 mg Oral Q4H PRN    0.9% sodium chloride infusion 250 mL  250 mL IntraVENous PRN    midodrine (PROAMATINE) tablet 20 mg  20 mg Oral Q8H    fludrocortisone (FLORINEF) tablet 0.1 mg  0.1 mg Oral BID    0.9% sodium chloride infusion 250 mL  250 mL IntraVENous PRN    gelatin sponge,absorb-porcine (GELFOAM) 100 sponge 1 Each  1 Each Topical PRN    fentaNYL citrate (PF) injection 50 mcg  50 mcg IntraVENous Q3H PRN    0.9% sodium chloride infusion 250 mL  250 mL IntraVENous PRN    traMADoL (ULTRAM) tablet 50 mg  50 mg Oral Q6H PRN    epoetin nabila-epbx (RETACRIT) injection 20,000 Units  20,000 Units SubCUTAneous Q TUE, THU & SAT    atorvastatin (LIPITOR) tablet 20 mg  20 mg Oral QHS    pantoprazole (PROTONIX) tablet 20 mg  20 mg Oral DAILY    pregabalin (LYRICA) capsule 25 mg  25 mg Oral DAILY    [Held by provider] tiZANidine (ZANAFLEX) tablet 2 mg  2 mg Oral BID    insulin regular (NOVOLIN R, HUMULIN R) injection   SubCUTAneous TIDAC    glucose chewable tablet 16 g  4 Tablet Oral PRN    dextrose (D50W) injection syrg 12.5-25 g  12.5-25 g IntraVENous PRN    glucagon (GLUCAGEN) injection 1 mg  1 mg IntraMUSCular PRN     ______________________________________________________________________  EXPECTED LENGTH OF STAY: 10d 9h  ACTUAL LENGTH OF STAY:          10                 Ila Petty MD

## 2021-11-23 NOTE — WOUND CARE
WOCN Note:      Follow up assessment of sacrum, buttocks and right foot.     Assessed in room 425 with Javier Rdz. Chart reviewed. Admitted DX:  Gangrene of left foot  11.16.21 s/p left above knee amputation; dressing dry and intact. Past Medical History: orthostatic hypotension, ESRD on HD, blindness, cad     Assessment:   Patient is A&O x 3, communicative and requires assist of 2 with repositioning. Bed: prius air mattress  Patient wearing briefs for incontinence. Patient reports no pain. Patient repositioned on right side with pillow. Right Heel offloaded with pillows. Right Heel intact without erythema.     1. Right sacrum, partial thickness wound from moisture and friction: 4 x 2 x 0.1 cm  100% blanching red; scant serosang exudate; no odor. Periwound without erythema. 2.  Perineum red denudation from moisture. 3.  Right lateral foot, 100% black eschar: 3 x 2 x 0.1; centrally unroofed 1 x 1 x 0.1 cm 100% black eschar. 4.  Left medial thigh, partial thickness wound:  1.5 x 0.5 x 0.1 cm; 100% moist red; no odor or erythema. 5.  Right dorsal great toe, deflated bullae with dark base:  0.8 x 2 x 0 cm. 6.  Right medial foot, blanching red erythema with central hyperpigmentation.     Wound, Pressure Prevention & Skin Care Recommendations:    1. Minimize layers of linen/pads under patient to optimize support surface. 2.  Turn/reposition approximately every 2 hours and offload heels. 3.  Manage moisture/ Keep skin folds clean and dry. 4.  Perineum and thigh:  Apply Zinc cream TID and with incontinence care. 5.   Right medial foot and heel:  Venelex TID and float on pillow. 6.  Specialty Bed:  Prius air mattress.   7.  Sacrum: hydrocolloid and sacral foam dressing.     Discussed above plan with patient and Javier Rdz RN.     Transition of Care:   Plan to follow as needed while admitted to hospital.     ASHLEY Rodriguez RN [facility] Inpatient Wound Care  Available on Perfect Serve  Pager [phone] [facility]

## 2021-11-23 NOTE — PROGRESS NOTES
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 BP holding stable. UF goal 1kg. Resting, no complaints this AM.    Review of Systems: A comprehensive review of systems was negative except for that written in the HPI.     Current Medications:   Current Facility-Administered Medications   Medication Dose Route Frequency    apixaban (ELIQUIS) tablet 5 mg  5 mg Oral BID    0.9% sodium chloride infusion 250 mL  250 mL IntraVENous PRN    albumin human 25% (BUMINATE) solution 25 g  25 g IntraVENous QID PRN    balsam peru-castor oiL (VENELEX) ointment   Topical TID    oxyCODONE IR (ROXICODONE) tablet 10 mg  10 mg Oral Q4H PRN    0.9% sodium chloride infusion 250 mL  250 mL IntraVENous PRN    midodrine (PROAMATINE) tablet 20 mg  20 mg Oral Q8H    fludrocortisone (FLORINEF) tablet 0.1 mg  0.1 mg Oral BID    0.9% sodium chloride infusion 250 mL  250 mL IntraVENous PRN    gelatin sponge,absorb-porcine (GELFOAM) 100 sponge 1 Each  1 Each Topical PRN    fentaNYL citrate (PF) injection 50 mcg  50 mcg IntraVENous Q3H PRN    0.9% sodium chloride infusion 250 mL  250 mL IntraVENous PRN    traMADoL (ULTRAM) tablet 50 mg  50 mg Oral Q6H PRN    epoetin nabila-epbx (RETACRIT) injection 20,000 Units  20,000 Units SubCUTAneous Q TUE, THU & SAT    atorvastatin (LIPITOR) tablet 20 mg  20 mg Oral QHS    pantoprazole (PROTONIX) tablet 20 mg  20 mg Oral DAILY    pregabalin (LYRICA) capsule 25 mg  25 mg Oral DAILY    [Held by provider] tiZANidine (ZANAFLEX) tablet 2 mg  2 mg Oral BID    insulin regular (NOVOLIN R, HUMULIN R) injection   SubCUTAneous TIDAC    glucose chewable tablet 16 g  4 Tablet Oral PRN    dextrose (D50W) injection syrg 12.5-25 g  12.5-25 g IntraVENous PRN    glucagon (GLUCAGEN) injection 1 mg  1 mg IntraMUSCular PRN      Allergies   Allergen Reactions    Adhesive Tape-Silicones Itching     Itchiness        Objective:  Vitals:    Vitals:    11/23/21 0846 11/23/21 0903 11/23/21 0918 11/23/21 0931   BP: 114/66 (!) 124/52 129/88 108/81   Pulse: 97 96 98 98   Resp:       Temp:       TempSrc:       SpO2:       Weight:         Intake and Output:  No intake/output data recorded. No intake/output data recorded. Physical Examination:  GEN: NAD,Blind  NECK- Supple, no mass  RESP: No wheezing,CLEAR b/l  CVS: S1,S2  RRR  NEURO: Normal speech, NON FOCAL  EXT: Left AKA, bleeding stump    []    High complexity decision making was performed  []    Patient is at high-risk of decompensation with multiple organ involvement    Lab Data Personally Reviewed: I have reviewed all the pertinent labs, microbiology data and radiology studies during assessment.     Recent Labs     11/23/21 0324 11/22/21  0545 11/21/21  0610 11/20/21  1207    140 140 142   K 3.3* 3.3* 3.6 3.5    106 106 105   CO2 28 30 31 29   * 175* 181* 160*   BUN 15 11 8 17   CREA 4.48* 3.68* 2.67* 4.11*   CA 8.1* 8.0* 8.0* 7.5*   MG  --   --   --  1.7   PHOS  --   --  2.2*  --    ALB  --   --  2.1* 2.4*   ALT  --   --   --  35     Recent Labs     11/23/21  0324 11/22/21  0545 11/21/21  0610 11/20/21  1207   WBC 15.5* 13.4* 15.3*  --    HGB 7.7* 7.8* 8.3* 8.0*   HCT 26.5* 25.5* 27.1* 26.3*    273 295  --      No results found for: SDES  Lab Results   Component Value Date/Time Culture result: LIGHT STAPHYLOCOCCUS AUREUS (A) 11/14/2021 01:54 AM    Culture result: HEAVY MIXED ENTERIC GRAM NEGATIVE RODS (A) 11/14/2021 01:54 AM    Culture result: NO GROWTH 5 DAYS 11/13/2021 11:55 PM     Recent Results (from the past 24 hour(s))   GLUCOSE, POC    Collection Time: 11/22/21 12:34 PM   Result Value Ref Range    Glucose (POC) 126 (H) 65 - 117 mg/dL    Performed by [address], POC    Collection Time: 11/22/21  4:28 PM   Result Value Ref Range    Glucose (POC) 173 (H) 65 - 117 mg/dL    Performed by KUMAR KEBEDE    GLUCOSE, POC    Collection Time: 11/22/21  5:19 PM   Result Value Ref Range    Glucose (POC) 221 (H) 65 - 117 mg/dL    Performed by Chelo Barrientos    METABOLIC PANEL, BASIC    Collection Time: 11/23/21  3:24 AM   Result Value Ref Range    Sodium 140 136 - 145 mmol/L    Potassium 3.3 (L) 3.5 - 5.1 mmol/L    Chloride 106 97 - 108 mmol/L    CO2 28 21 - 32 mmol/L    Anion gap 6 5 - 15 mmol/L    Glucose 114 (H) 65 - 100 mg/dL    BUN 15 6 - 20 MG/DL    Creatinine 4.48 (H) 0.70 - 1.30 MG/DL    BUN/Creatinine ratio 3 (L) 12 - 20      GFR est AA 16 (L) >60 ml/min/1.73m2    GFR est non-AA 13 (L) >60 ml/min/1.73m2    Calcium 8.1 (L) 8.5 - 10.1 MG/DL   CBC WITH AUTOMATED DIFF    Collection Time: 11/23/21  3:24 AM   Result Value Ref Range    WBC 15.5 (H) 4.1 - 11.1 K/uL    RBC 2.69 (L) 4.10 - 5.70 M/uL    HGB 7.7 (L) 12.1 - 17.0 g/dL    HCT 26.5 (L) 36.6 - 50.3 %    MCV 98.5 80.0 - 99.0 FL    MCH 28.6 26.0 - 34.0 PG    MCHC 29.1 (L) 30.0 - 36.5 g/dL    RDW 17.1 (H) 11.5 - 14.5 %    PLATELET 945 957 - 815 K/uL    MPV 9.6 8.9 - 12.9 FL    NRBC 1.7 (H) 0  WBC    ABSOLUTE NRBC 0.27 (H) 0.00 - 0.01 K/uL    NEUTROPHILS 86 (H) 32 - 75 %    LYMPHOCYTES 6 (L) 12 - 49 %    MONOCYTES 3 (L) 5 - 13 %    EOSINOPHILS 3 0 - 7 %    BASOPHILS 1 0 - 1 %    METAMYELOCYTES 1 (H) 0 %    IMMATURE GRANULOCYTES 0 %    ABS. NEUTROPHILS 13.3 (H) 1.8 - 8.0 K/UL    ABS. LYMPHOCYTES 0.9 0.8 - 3.5 K/UL    ABS. MONOCYTES 0.5 0.0 - 1.0 K/UL    ABS. EOSINOPHILS 0.5 (H) 0.0 - 0.4 K/UL    ABS. BASOPHILS 0.2 (H) 0.0 - 0.1 K/UL    ABS. IMM. GRANS. 0.0 K/UL    DF MANUAL      RBC COMMENTS ANISOCYTOSIS  1+        RBC COMMENTS POLYCHROMASIA  1+       GLUCOSE, POC    Collection Time: 11/23/21  8:19 AM   Result Value Ref Range    Glucose (POC) 114 65 - 117 mg/dL    Performed by [facility]            Total time spent with patient:  xxx   min. Care Plan discussed with:  Patient     Family      RN      Consulting Physician [address] [facility],         I have reviewed the flowsheets. Chart and Pertinent Notes have been reviewed. No change in PMH ,family and social history from Consult note.       Boogie Holden MD

## 2021-11-23 NOTE — PROGRESS NOTES
Problem: Pressure Injury - Risk of  Goal: *Prevention of pressure injury  Description: Document Faizan Scale and appropriate interventions in the flowsheet. Outcome: Progressing Towards Goal  Note: Pressure Injury Interventions:  Sensory Interventions: Assess changes in LOC, Keep linens dry and wrinkle-free, Maintain/enhance activity level, Minimize linen layers    Moisture Interventions: Absorbent underpads, Check for incontinence Q2 hours and as needed    Activity Interventions: Pressure redistribution bed/mattress(bed type)    Mobility Interventions: Pressure redistribution bed/mattress (bed type), HOB 30 degrees or less    Nutrition Interventions: Document food/fluid/supplement intake    Friction and Shear Interventions: HOB 30 degrees or less, Minimize layers, Foam dressings/transparent film/skin sealants, Apply protective barrier, creams and emollients                Problem: Falls - Risk of  Goal: *Absence of Falls  Description: Document Victor M Fall Risk and appropriate interventions in the flowsheet.   Outcome: Progressing Towards Goal  Note: Fall Risk Interventions:  Mobility Interventions: Communicate number of staff needed for ambulation/transfer         Medication Interventions: Evaluate medications/consider consulting pharmacy    Elimination Interventions: Call light in reach, Patient to call for help with toileting needs, Toileting schedule/hourly rounds

## 2021-11-23 NOTE — PROCEDURES
Hemodialysis / [phone]    Vitals Pre Post Assessment Pre Post   BP BP: (!) 127/100 (Hd started ) (11/23/21 0745) 118/86 LOC Pt AXOX4 Pt AXOX4   HR Pulse (Heart Rate): 89 (11/23/21 0745) 101   Lungs Diminished  Diminished    Resp Resp Rate: 15 (11/23/21 0730) 17 Cardiac HR Regular HR regular    Temp Temp: 98.3 °F (36.8 °C) (11/23/21 0730) 98.3   Skin Warm and dry. Left BKA Warm and dry. Left BKA   Weight Pre-Dialysis Weight:  (unable to obtain ) (11/23/21 0745) Bed not zeroed   Edema Generalized Generalized    Tele status Monitor Monitor  Pain Pain Intensity 1: 0 (11/23/21 0608) 0     Orders   Duration: Start: 0745 End: 1115 Total: 3.5 hours    Dialyzer: Dialyzer/Set Up Inspection: Revaclear (11/23/21 0745)   K Bath: Dialysate K (mEq/L): 4 (11/23/21 0745)   Ca Bath: Dialysate CA (mEq/L): 2.5 (11/23/21 0745)   Na: Dialysate NA (mEq/L): 140 (11/23/21 0745)   Bicarb: Dialysate HCO3 (mEq/L): 35 (11/23/21 0745)   Target Fluid Removal: Goal/Amount of Fluid to Remove (mL): 1000 mL (11/23/21 0745)     Access   Type & Location: Left Thigh Graft   Comments:         Skin clean, dry, and intact. +bruit and +thrill. Skin cleansed with alcohol. CHG used per P&P and allowed to dry. Cannulated x2. HD started.                                 Labs   HBsAg (Antigen) / date: Negative 11/18/21                                             HBsAb (Antibody) / date: Immune 6/08/21   Source: [facility], Antibody Physician's Portal    Obtained/Reviewed  Critical Results Called HGB   Date Value Ref Range Status   11/23/2021 7.7 (L) 12.1 - 17.0 g/dL Final     Potassium   Date Value Ref Range Status   11/23/2021 3.3 (L) 3.5 - 5.1 mmol/L Final     Calcium   Date Value Ref Range Status   11/23/2021 8.1 (L) 8.5 - 10.1 MG/DL Final     BUN   Date Value Ref Range Status   11/23/2021 15 6 - 20 MG/DL Final     Creatinine   Date Value Ref Range Status   11/23/2021 4.48 (H) 0.70 - 1.30 MG/DL Final        Meds Given   Name Dose Route All dialysis related medications have been reviewed. Adequacy / Fluid    Total Liters Process: 80.3   Net Fluid Removed: 1000 ml       Comments   Time Out Done:   (Time) 0730   Admitting Diagnosis: Gangrene of Left foot    Consent obtained/signed: Informed Consent Verified: Yes (11/23/21 0745)   Machine / RO # Machine Number: [id number] (11/23/21 0745)   Primary Nurse Rpt Pre: Beba Arreola RN    Primary Nurse Rpt Post: Rachel Capellan RN    Pt Education: Pt educated about access, diet, and treatment orders. Care Plan: TTS dialysis while here    Pts outpatient clinic: [facility]      Tx Summary   Comments:         0730-Timeout complete. Midodrine and Florinef requested from primary RN. Change of shift  0745- Treatment started. 0815-Pt tolerating treatment well. VSS.           0830-Dr Blunt rounded. No new orders received. 2500- Primary RN gives Midodrine and Florinef. 0930- Pt tolerating treatment well. No complaints. VSS. 1030- Pt resting. No issues. VSS   1115- Treatment complete. All possible blood returned. Hemostasis achieved. +burit and +thrill post treatment. Pt left resting in bed with call light in reach. Report given to primary RN Tylor Mascorro.

## 2021-11-23 NOTE — PROGRESS NOTES
Physical Therapy    Pt unavailable for PT treatment due to receiving dialysis. Will con't to follow.     Sky Mention, PT, MPT

## 2021-11-24 NOTE — PROGRESS NOTES
Problem: Mobility Impaired (Adult and Pediatric)  Goal: *Acute Goals and Plan of Care (Insert Text)  Description: FUNCTIONAL STATUS PRIOR TO ADMISSION: pt reports being bed bound for one month and prior to that being able to get up to a wheelchair stand pivot transfer. He reports not using any supplemental 02 at baseline. HOME SUPPORT PRIOR TO ADMISSION: The patient lived in a SNF LTC. Physical Therapy Goals  Initiated 11/21/2021  1. Patient will move from supine to sit and sit to supine , scoot up and down, and roll side to side in bed with moderate assistance  within 7 day(s). 2.  Patient will sit at the EOB engaged in activity for 5 minutes with min assist for sitting balance within 7 day(s). 3.  Patient will participate in LE ex and desensitization techniques for LLE in  within 7 day(s). Outcome: Not Met   PHYSICAL THERAPY TREATMENT  Patient: Moncho Kwon (13 y.o. male)  Date: 11/24/2021  Diagnosis: Gangrene of left foot ([address] Ludger Hester   <principal problem not specified>  Procedure(s) (LRB):  LEFT ABOVE KNEE AMPUTATION (Left) 8 Days Post-Op  Precautions: Fall, Skin, Bed Alarm (blind)  Chart, physical therapy assessment, plan of care and goals were reviewed. ASSESSMENT  Patient continues with skilled PT services and is progressing towards goals. Functional mobility remains limited by decreased strength, ROM, balance and activity tolerance and increased pain. Worked to improve patient contribution with bed mobility having him assist with UE on bed rail to roll and sit EOB. Patient participated well though continues to require up to Max assist for supine<->sit and with poor sitting balance. He is able to correct left lateral lean to neutral with cues (verbal and tactile) provided and maintain with own UE support. He is reporting 7/10 pain during this session. RN aware.       Recommend OOB to the chair via mechanical lift/ mobility team.  Therapy will continue to work towards bed to chair transfer as appropriate. At this time, sitting balance and upper strength limits. Current Level of Function Impacting Discharge (mobility/balance): Max to total assist; Impaired sitting balance    Other factors to consider for discharge: blind, bed bound a month prior to admission, heart failure, ESRD on HD, DM          PLAN :  Patient continues to benefit from skilled intervention to address the above impairments. Continue treatment per established plan of care. to address goals. Recommendation for discharge: (in order for the patient to meet his/her long term goals)  Therapy up to 5 days/week in SNF setting, return to LTC    This discharge recommendation:  A follow-up discussion with the attending provider and/or case management is planned    IF patient discharges home will need the following DME: patient owns DME required for discharge       SUBJECTIVE:       OBJECTIVE DATA SUMMARY:   Critical Behavior:  Neurologic State: Alert  Orientation Level: Oriented X4  Cognition: Appropriate decision making, Appropriate for age attention/concentration, Appropriate safety awareness, Follows commands     Functional Mobility Training:  Bed Mobility:  Rolling: Maximum assistance; Assist x2; Additional time; Adaptive equipment (bed rail)  Supine to Sit: Maximum assistance; Assist x2; Additional time; Adaptive equipment; Other (comment); Bed Modified (bedrail, HOB elevated)  Sit to Supine: Total assistance  Scooting: Total assistance       Transfers:                                   Balance:  Sitting: Impaired; With support  Sitting - Static: Poor (constant support)  Sitting - Dynamic: Poor (constant support)   Worked to improve sitting balance. Pt demonstrates left lean, able to correct with verbal and tactile cues with reminder cues provided throughout session. Also worked to improve head upright providing verbal and tactile cues.       Ambulation/Gait Training:                                     Therapeutic Exercises:   Instructed in quad set to perform outside of therapy sessions and to work to achieve RLE extension. Pain Ratin/10 post op pain. RN aware, will provide meds after session    Activity Tolerance:   Fair and requires rest breaks    After treatment patient left in no apparent distress:   Supine in bed, Call bell within reach and Side rails x 3    COMMUNICATION/COLLABORATION:   The patients plan of care was discussed with: Registered nurse.      Rafiq Dennison, PT   Time Calculation: 28 mins

## 2021-11-24 NOTE — PROGRESS NOTES
[facility]   [address] [facility], [address] [place] [address], [facility] [place]  Phone: [phone]   [phone]       Nephrology Progress Note  Ivana Dumont.     1950     [id number]  Date of Admission : 11/13/2021 11/24/21    CC: Follow up for esrd       Assessment and Plan   ESRD- HD  - dialyzes TTS at [facility]   - Midodrine and Florinef prior to HD  - has Lft thigh AVG for access  - HD tomorrow per routine    Hypokalemia  - 4 k bath    Left foot Wet gangrene:   - s/p AKA 11/16    Anemia in CKD  Blood loss anemia   - f/u H/h  - continue SUNNY  -Transfuse PRN for Hb < 8    Sec HPTH   - continue home binders     Chronic Hypotension   - continue Midodrine and Florinef      Interval History:  Seen and examined. Resting in bed. Stable BP. Pain controlled. Denies cp, sob, n/v/d    Review of Systems: A comprehensive review of systems was negative except for that written in the HPI.     Current Medications:   Current Facility-Administered Medications   Medication Dose Route Frequency    clopidogreL (PLAVIX) tablet 75 mg  75 mg Oral DAILY    apixaban (ELIQUIS) tablet 5 mg  5 mg Oral BID    0.9% sodium chloride infusion 250 mL  250 mL IntraVENous PRN    albumin human 25% (BUMINATE) solution 25 g  25 g IntraVENous QID PRN    balsam peru-castor oiL (VENELEX) ointment   Topical TID    oxyCODONE IR (ROXICODONE) tablet 10 mg  10 mg Oral Q4H PRN    0.9% sodium chloride infusion 250 mL  250 mL IntraVENous PRN    midodrine (PROAMATINE) tablet 20 mg  20 mg Oral Q8H    fludrocortisone (FLORINEF) tablet 0.1 mg  0.1 mg Oral BID    0.9% sodium chloride infusion 250 mL  250 mL IntraVENous PRN    gelatin sponge,absorb-porcine (GELFOAM) 100 sponge 1 Each  1 Each Topical PRN    fentaNYL citrate (PF) injection 50 mcg  50 mcg IntraVENous Q3H PRN    0.9% sodium chloride infusion 250 mL  250 mL IntraVENous PRN    traMADoL (ULTRAM) tablet 50 mg  50 mg Oral Q6H PRN    epoetin nabila-epbx (RETACRIT) injection 20,000 Units  20,000 Units SubCUTAneous Q TUE, THU & SAT    atorvastatin (LIPITOR) tablet 20 mg  20 mg Oral QHS    pantoprazole (PROTONIX) tablet 20 mg  20 mg Oral DAILY    pregabalin (LYRICA) capsule 25 mg  25 mg Oral DAILY    [Held by provider] tiZANidine (ZANAFLEX) tablet 2 mg  2 mg Oral BID    insulin regular (NOVOLIN R, HUMULIN R) injection   SubCUTAneous TIDAC    glucose chewable tablet 16 g  4 Tablet Oral PRN    dextrose (D50W) injection syrg 12.5-25 g  12.5-25 g IntraVENous PRN    glucagon (GLUCAGEN) injection 1 mg  1 mg IntraMUSCular PRN      Allergies   Allergen Reactions    Adhesive Tape-Silicones Itching     Itchiness        Objective:  Vitals:    Vitals:    11/24/21 0218 11/24/21 0434 11/24/21 0611 11/24/21 0743   BP: (!) 111/57  118/71 128/69   Pulse: 97  (!) 107 (!) 107   Resp: 23   27   Temp: 98.6 °F (37 °C)   98.5 °F (36.9 °C)   TempSrc:       SpO2: 100%   100%   Weight:  89.9 kg (198 lb 3.1 oz)     Height:         Intake and Output:  No intake/output data recorded. 11/22 1901 - 11/24 0700  In: -   Out: 1000     Physical Examination:  GEN: NAD,Blind  NECK- Supple, no mass  RESP: No wheezing,CLEAR b/l  CVS: S1,S2  RRR  NEURO: Normal speech, NON FOCAL  EXT: Left AKA, bleeding stump    []    High complexity decision making was performed  []    Patient is at high-risk of decompensation with multiple organ involvement    Lab Data Personally Reviewed: I have reviewed all the pertinent labs, microbiology data and radiology studies during assessment.     Recent Labs     11/24/21  0157 11/24/21  0156 11/23/21  0324 11/22/21  0545    139 140 140   K 3.4* 3.4* 3.3* 3.3*    105 106 106   CO2 29 30 28 30   * 185* 114* 175*   BUN 11 12 15 11   CREA 3.33* 3.34* 4.48* 3.68*   CA 7.9* 8.1* 8.1* 8.0*   PHOS 2.3*  --   --   --    ALB 1.7*  --   --   --      Recent Labs     11/24/21  0157 11/23/21  0324 11/22/21  0545   WBC 11.8* 15.5* 13.4*   HGB 7.9* 7.7* 7.8*   HCT 26.5* 26.5* 25.5*    292 273     No results found for: SDES  Lab Results   Component Value Date/Time    Culture result: LIGHT STAPHYLOCOCCUS AUREUS (A) 11/14/2021 01:54 AM    Culture result: HEAVY MIXED ENTERIC GRAM NEGATIVE RODS (A) 11/14/2021 01:54 AM    Culture result: NO GROWTH 5 DAYS 11/13/2021 11:55 PM     Recent Results (from the past 24 hour(s))   GLUCOSE, POC    Collection Time: 11/23/21 11:40 AM   Result Value Ref Range    Glucose (POC) 91 65 - 117 mg/dL    Performed by [address] [place] [address], POC    Collection Time: 11/23/21  4:41 PM   Result Value Ref Range    Glucose (POC) 259 (H) 65 - 117 mg/dL    Performed by [address] [place] [address], [facility]    Collection Time: 11/24/21  1:56 AM   Result Value Ref Range    Sodium 139 136 - 145 mmol/L    Potassium 3.4 (L) 3.5 - 5.1 mmol/L    Chloride 105 97 - 108 mmol/L    CO2 30 21 - 32 mmol/L    Anion gap 4 (L) 5 - 15 mmol/L    Glucose 185 (H) 65 - 100 mg/dL    BUN 12 6 - 20 MG/DL    Creatinine 3.34 (H) 0.70 - 1.30 MG/DL    BUN/Creatinine ratio 4 (L) 12 - 20      GFR est AA 22 (L) >60 ml/min/1.73m2    GFR est non-AA 18 (L) >60 ml/min/1.73m2    Calcium 8.1 (L) 8.5 - 10.1 MG/DL   PROCALCITONIN    Collection Time: 11/24/21  1:56 AM   Result Value Ref Range    Procalcitonin 2.84 ng/mL   RENAL FUNCTION PANEL    Collection Time: 11/24/21  1:57 AM   Result Value Ref Range    Sodium 140 136 - 145 mmol/L    Potassium 3.4 (L) 3.5 - 5.1 mmol/L    Chloride 106 97 - 108 mmol/L    CO2 29 21 - 32 mmol/L    Anion gap 5 5 - 15 mmol/L    Glucose 182 (H) 65 - 100 mg/dL    BUN 11 6 - 20 MG/DL    Creatinine 3.33 (H) 0.70 - 1.30 MG/DL    BUN/Creatinine ratio 3 (L) 12 - 20      GFR est AA 22 (L) >60 ml/min/1.73m2    GFR est non-AA 18 (L) >60 ml/min/1.73m2    Calcium 7.9 (L) 8.5 - 10.1 MG/DL    Phosphorus 2.3 (L) 2.6 - 4.7 MG/DL    Albumin 1.7 (L) 3.5 - 5.0 g/dL   CBC WITH AUTOMATED DIFF    Collection Time: 11/24/21  1:57 AM   Result Value Ref Range    WBC 11.8 (H) 4.1 - 11.1 K/uL    RBC 2.68 (L) 4.10 - 5.70 M/uL    HGB 7.9 (L) 12.1 - 17.0 g/dL    HCT 26.5 (L) 36.6 - 50.3 %    MCV 98.9 80.0 - 99.0 FL    MCH 29.5 26.0 - 34.0 PG    MCHC 29.8 (L) 30.0 - 36.5 g/dL    RDW 17.2 (H) 11.5 - 14.5 %    PLATELET 871 340 - 748 K/uL    MPV 9.4 8.9 - 12.9 FL    NRBC 1.9 (H) 0  WBC    ABSOLUTE NRBC 0.22 (H) 0.00 - 0.01 K/uL    NEUTROPHILS 89 (H) 32 - 75 %    LYMPHOCYTES 7 (L) 12 - 49 %    MONOCYTES 2 (L) 5 - 13 %    EOSINOPHILS 1 0 - 7 %    BASOPHILS 1 0 - 1 %    IMMATURE GRANULOCYTES 0 %    ABS. NEUTROPHILS 10.6 (H) 1.8 - 8.0 K/UL    ABS. LYMPHOCYTES 0.8 0.8 - 3.5 K/UL    ABS. MONOCYTES 0.2 0.0 - 1.0 K/UL    ABS. EOSINOPHILS 0.1 0.0 - 0.4 K/UL    ABS. BASOPHILS 0.1 0.0 - 0.1 K/UL    ABS. IMM. GRANS. 0.0 K/UL    DF MANUAL      RBC COMMENTS POLYCHROMASIA  1+        RBC COMMENTS ANISOCYTOSIS  1+        RBC COMMENTS TARGET CELLS  PRESENT       GLUCOSE, POC    Collection Time: 11/24/21  8:24 AM   Result Value Ref Range    Glucose (POC) 201 (H) 65 - 117 mg/dL    Performed by Stacey Liao            Total time spent with patient:  xxx   min. Care Plan discussed with:  Patient     Family      RN      Consulting Physician [facility]        I have reviewed the flowsheets. Chart and Pertinent Notes have been reviewed. No change in PMH ,family and social history from Consult note.       Armando Johnson MD

## 2021-11-24 NOTE — PROGRESS NOTES
[address] [facility] Adult  Hospitalist Group                                                                                          Hospitalist Progress Note  Selena Dewitt MD  Answering service: [phone] from in house phone        Date of Service:  2021  NAME:  Fabi Madera. :  1950  MRN:  [id number]      Admission Summary:   Heriberto Lakhani Jr. is a 66 y. o. male with hx of orthostatic hypotension, ESRD on HD, Blindness, cad who presents to hospital from LTCF for evaluation of left foot infection. Pt states he has had pain in his left foot for the last month. Unable to describe any additional changes due to his poor eye-sight. States he has otherwise been doing and feeling well. The patient denies any fever, chills, chest or abdominal pain, nausea, vomiting, cough, congestion, recent illness, palpitations, or dysuria. Remarkable vitals on ER Presentations: unremarkable  Labs Remarkable for: wbc 20.1, hgb 10.4, crp 18.90, esr 41,   ER Images: CT LLE: Suboptimal examination secondary to motion artifact. No definite bony  destruction is evident. Diffuse osteopenia. Diffuse soft tissue swelling in the  left foot. Interval history / Subjective:      Chart reviewed. Patient examined. --No complaints. Denied sob,chest pressure/pain. Spo2 100% on 2 l/min, he is not on home oxygen. Took him off oxygen ,spo2 remained upper 90s,informed RN to monitor. --He confirmed he has been on aspirin,plavix and eliquis       Assessment & Plan:     Osteomyelitis,  Left Foot w/ Gagrene of LLE 3rd et 4th Digit (POA)   - S/PLeft AKA ()  - Wound culture prior to AKA grew light mssa and heavy mixed enetric GNR. Blood cx negative. received vancomycin and Zosyn, discontinued. - Vascular surgery following   -WBC down. Bleeding from the left AKA site  --Resolved.     Acute post op blood loss anemia on top of chronic anemia of chronic disease  Baseline HB~10  --HB stable, 7.8 today s/p 2 units PRBC,Transfuse for Hb<8 ,additional one unit ordered     ESRD on HD  -Nephro following for HD management   - Patient is TTS schedule      CAD  -Resumed Eliquis and antiplatelets, vascular surgery okay.     NIDDM II w/ Diabetic Neuropathy  -SSI + Hypoglycemic protocols  -Accuchecks q6 while npo     Orthostatic Hypotension likely worsened post due to acute blood loss. BP better this morning  -Blood pressure stable > 100, Home midrodine and fludrocortisone  -received a unit of blood. albumin as needed      On 11/20 hypokalemia,K 2. 5. Intermittently tachycardiac,frequent PVCs. Rhythm appears a flutter on monitor,frequent PCVs.  Treated with IV potassium  --Monitor and replete    Difficult access. left subclavian central line placed by IR on 11/19     Code status: Full   DVT prophylaxis:     Care Plan discussed with: Patient/Family and Nurse  Anticipated Disposition: Return to facilty   Anticipated Discharge: 24 hours to 48 hours     Hospital Problems  Date Reviewed: 9/1/2021          Codes Class Noted POA    Gangrene of left foot ([facility] [address] ICD-10-CM: N21  ICD-9-CM: 785.4  11/14/2021 Unknown                Review of Systems:   A comprehensive review of systems was negative except for that written in the HPI. Vital Signs:    Last 24hrs VS reviewed since prior progress note. Most recent are:  Visit Vitals  /69   Pulse (!) 107   Temp 98.5 °F (36.9 °C)   Resp 27   Ht 5' 9\" (1.753 m)   Wt 89.9 kg (198 lb 3.1 oz)   SpO2 100%   BMI 29.27 kg/m²         Intake/Output Summary (Last 24 hours) at 11/24/2021 0956  Last data filed at 11/23/2021 1115  Gross per 24 hour   Intake    Output 1000 ml   Net -1000 ml        Physical Examination:             Constitutional:  Alert and oriented x4. Blind   ENT:  Oral mucosa moist, oropharynx benign. Resp:  CTA bilaterally. No wheezing/rhonchi/rales. No accessory muscle use   CV:  Regular rhythm, normal rate, no murmurs, gallops, rubs    GI:  Soft, non distended, non tender.  normoactive bowel sounds,    Musculoskeletal:  No edema, warm, 2+ pulses throughout. Left AKA bandaged. Neurologic:  AAOx3, CN II-XII reviewed. Blind. Moves all extremities. Psych:  Good insight, Not anxious nor agitated. Data Review:    Review and/or order of clinical lab test  Review and/or order of tests in the medicine section of [facility]      Labs:     Recent Labs     11/24/21 0157 11/23/21 0324   WBC 11.8* 15.5*   HGB 7.9* 7.7*   HCT 26.5* 26.5*    292     Recent Labs     11/24/21 0157 11/24/21 0156 11/23/21  0324    139 140   K 3.4* 3.4* 3.3*    105 106   CO2 29 30 28   BUN 11 12 15   CREA 3.33* 3.34* 4.48*   * 185* 114*   CA 7.9* 8.1* 8.1*   PHOS 2.3*  --   --      Recent Labs     11/24/21 0157   ALB 1.7*     No results for input(s): INR, PTP, APTT, INREXT, INREXT in the last 72 hours. No results for input(s): FE, TIBC, PSAT, FERR in the last 72 hours. No results found for: FOL, RBCF   No results for input(s): PH, PCO2, PO2 in the last 72 hours. No results for input(s): CPK, CKNDX, TROIQ in the last 72 hours.     No lab exists for component: CPKMB  Lab Results   Component Value Date/Time    Cholesterol, total 189 08/12/2011 10:06 AM    HDL Cholesterol 37 (L) 08/12/2011 10:06 AM    LDL, calculated 127 (H) 08/12/2011 10:06 AM    Triglyceride 125 08/12/2011 10:06 AM     Lab Results   Component Value Date/Time    Glucose (POC) 201 (H) 11/24/2021 08:24 AM    Glucose (POC) 259 (H) 11/23/2021 04:41 PM    Glucose (POC) 91 11/23/2021 11:40 AM    Glucose (POC) 114 11/23/2021 08:19 AM    Glucose (POC) 221 (H) 11/22/2021 05:19 PM     No results found for: COLOR, APPRN, SPGRU, REFSG, JOYA, PROTU, GLUCU, KETU, BILU, UROU, LAWRENCE, LEUKU, GLUKE, EPSU, BACTU, WBCU, RBCU, CASTS, UCRY      Medications Reviewed:     Current Facility-Administered Medications   Medication Dose Route Frequency    clopidogreL (PLAVIX) tablet 75 mg  75 mg Oral DAILY    apixaban (ELIQUIS) tablet 5 mg  5 mg Oral BID    0.9% sodium chloride infusion 250 mL  250 mL IntraVENous PRN    albumin human 25% (BUMINATE) solution 25 g  25 g IntraVENous QID PRN    balsam peru-castor oiL (VENELEX) ointment   Topical TID    oxyCODONE IR (ROXICODONE) tablet 10 mg  10 mg Oral Q4H PRN    0.9% sodium chloride infusion 250 mL  250 mL IntraVENous PRN    midodrine (PROAMATINE) tablet 20 mg  20 mg Oral Q8H    fludrocortisone (FLORINEF) tablet 0.1 mg  0.1 mg Oral BID    0.9% sodium chloride infusion 250 mL  250 mL IntraVENous PRN    gelatin sponge,absorb-porcine (GELFOAM) 100 sponge 1 Each  1 Each Topical PRN    fentaNYL citrate (PF) injection 50 mcg  50 mcg IntraVENous Q3H PRN    0.9% sodium chloride infusion 250 mL  250 mL IntraVENous PRN    traMADoL (ULTRAM) tablet 50 mg  50 mg Oral Q6H PRN    epoetin nabila-epbx (RETACRIT) injection 20,000 Units  20,000 Units SubCUTAneous Q TUE, THU & SAT    atorvastatin (LIPITOR) tablet 20 mg  20 mg Oral QHS    pantoprazole (PROTONIX) tablet 20 mg  20 mg Oral DAILY    pregabalin (LYRICA) capsule 25 mg  25 mg Oral DAILY    [Held by provider] tiZANidine (ZANAFLEX) tablet 2 mg  2 mg Oral BID    insulin regular (NOVOLIN R, HUMULIN R) injection   SubCUTAneous TIDAC    glucose chewable tablet 16 g  4 Tablet Oral PRN    dextrose (D50W) injection syrg 12.5-25 g  12.5-25 g IntraVENous PRN    glucagon (GLUCAGEN) injection 1 mg  1 mg IntraMUSCular PRN     ______________________________________________________________________  EXPECTED LENGTH OF STAY: 10d 9h  ACTUAL LENGTH OF STAY:          11                 Katy Valencia MD

## 2021-11-24 NOTE — PROGRESS NOTES
TRANSITION OF CARE  RUR--18%  Disposition--To [facility]- initially for SNF rehab then transition return to LTC. Transport--BLS with [facility] scheduled for  11/25/21 at 12 noon. CM placed completed PCS on chart. CM follow up. CM was advised by the hospitalist that patient is stable for discharge. CM spoke with Roberto Palma, Admission Director [facility], who confirmed that patient is accepted and that bed will be available 11/25/21. Per Clement, transport time 11/25/21 would be best at 12 noon. Nurse Report to be called to [phone]. CM sent referral via [employer] to  [address] [facility]) (Phone [phone]) for BLS transport, and referral was accepted  for a 11/25/21 at 12 noon . CM completed PCS and placed it on chart. CM gave verbal update to staff nurse. Transition of Care Plan to SNF/Rehab    SNF/Rehab Transition:  Patient has been accepted to [facility] and meets criteria for admission. Patient will transported by [facility] and expected to leave at 12 noon on 11/25/21. Communication to Patient/Family:  Met with patient, he is agreeable to the transition plan. Communication to SNF/Rehab:  Bedside RN has been notified to update the transition plan to the facility and call report (phone number [phone]. Discharge information has been updated on the AVS.       Nursing Please include all hard scripts for controlled substances, med rec and dc summary, and AVS in packet.      Reviewed and confirmed with facility can manage the patient care needs for the following:     Driss Woodall with (X) only those applicable:    Medication:  [x]  Medications will be available at the facility  []  IV Antibiotics   []  Controlled Substance - hard copy to be sent with patient   []  Weekly Labs   Documents:  [] Hard RX  [x] MAR  [x] Kardex  [x] AVS  []Transfer Summary  []Discharge   Equipment:  []  CPAP/BiPAP  []  Wound Vacuum  []  [place] or Urinary Device  []  PICC/Central Line  [] Nebulizer  []  Ventilator   Treatment:  []Isolation (for MRSA, VRE, etc.)  []Surgical Drain Management  []Tracheostomy Care  []Dressing Changes  []Dialysis with transportation and chair time. []PEG Care  []Oxygen  []Daily Weights for Heart Failure   Dietary:  []Any diet limitations  []Tube Feedings   []Total Parenteral Management (TPN)   Eligible for Medicaid Long Term Services and Supports  Yes:  [] Eligible for medical assistance or will become eligible within 180 days and UAI completed. [] Provider/Patient and/or support system has requested screening. [] UAI copy provided to patient or responsible party,   [] UAI unavailable at discharge will send once processed to SNF provider. [] UAI unavailable at discharged mailed to patient  No:   [] Private pay and is not financially eligible for Medicaid within the next 180 days. [] Reside out-of-state.   [] A residents of a state owned/operated facility that is licensed  by [place] [address] [employer] or [facility]  [] Enrollment in [facility] hospice services  [] [address] [place] [address] [facility]  [] Patient /Family declines to have screening completed or provide financial information for screening     Financial Resources:  Medicaid    [] Initiated and application pending   [] Full coverage     Advanced Care Plan:  []Surrogate Decision Maker of Care  []POA  []Communicated Code Status (DDNR\", \"Full\")    Other

## 2021-11-24 NOTE — PROGRESS NOTES
Problem: Pressure Injury - Risk of  Goal: *Prevention of pressure injury  Description: Document Faizan Scale and appropriate interventions in the flowsheet. Outcome: Progressing Towards Goal  Note: Pressure Injury Interventions:  Sensory Interventions: Assess changes in LOC, Float heels, Keep linens dry and wrinkle-free, Minimize linen layers, Monitor skin under medical devices, Pressure redistribution bed/mattress (bed type)    Moisture Interventions: Absorbent underpads, Assess need for specialty bed, Check for incontinence Q2 hours and as needed    Activity Interventions: Pressure redistribution bed/mattress(bed type), PT/OT evaluation    Mobility Interventions: HOB 30 degrees or less, Pressure redistribution bed/mattress (bed type), PT/OT evaluation    Nutrition Interventions: Document food/fluid/supplement intake, Offer support with meals,snacks and hydration    Friction and Shear Interventions: Apply protective barrier, creams and emollients, HOB 30 degrees or less, Lift sheet                Problem: Falls - Risk of  Goal: *Absence of Falls  Description: Document Victor M Fall Risk and appropriate interventions in the flowsheet.   Outcome: Progressing Towards Goal  Note: Fall Risk Interventions:  Mobility Interventions: Communicate number of staff needed for ambulation/transfer, OT consult for ADLs, PT Consult for mobility concerns         Medication Interventions: Evaluate medications/consider consulting pharmacy, Patient to call before getting OOB    Elimination Interventions: Bed/chair exit alarm, Call light in reach              Problem: Discharge Planning  Goal: *Discharge to safe environment  Outcome: Progressing Towards Goal     Problem: Patient Education: Go to Patient Education Activity  Goal: Patient/Family Education  Outcome: Progressing Towards Goal     Problem: Nutrition Deficit  Goal: *Optimize nutritional status  Outcome: Progressing Towards Goal

## 2021-11-24 NOTE — PROGRESS NOTES
Bedside shift change report given to [employer], RN (oncoming nurse) by Diana Meléndez RN (offgoing nurse). Report included the following information SBAR, Kardex, MAR, Recent Results and Cardiac Rhythm NSR. none

## 2021-11-25 PROBLEM — Z95.818 STATUS POST PLACEMENT OF IMPLANTABLE LOOP RECORDER: Status: RESOLVED | Noted: 2021-01-01 | Resolved: 2021-01-01

## 2021-11-25 NOTE — PROCEDURES
Hemodialysis / [phone]    Vitals Pre Post Assessment Pre Post   BP BP: (!) 135/59 (11/25/21 0745) 129/103 LOC A/O A/O   HR Pulse (Heart Rate): 98 (11/25/21 0745) 96 Lungs Diminished Diminished   Resp Resp Rate: 17 (11/25/21 0745) 18 Cardiac tachy tachy   Temp Temp: 98 °F (36.7 °C) (11/25/21 0745) 97.8 Skin AVG intact, scabbing AVG intact, scabbing   Weight Pre-Dialysis Weight:  (unable to obtain ) (11/23/21 0745) na Edema Generalized Generalized   Tele status bedisde bedside Pain Pain Intensity 1: 0 (11/24/21 1200) 0     Orders   Duration: Start: 0745 End: 1115 Total: 3.5   Dialyzer: Dialyzer/Set Up Inspection: Revaclear (11/25/21 0745)   K Bath: Dialysate K (mEq/L): 4 (11/25/21 0745)   Ca Bath: Dialysate CA (mEq/L): 2.5 (11/25/21 0745)   Na: Dialysate NA (mEq/L): 140 (11/25/21 0745)   Bicarb: Dialysate HCO3 (mEq/L): 35 (11/25/21 0745)   Target Fluid Removal: Goal/Amount of Fluid to Remove (mL): 1000 mL (11/25/21 0745)     Access   Type & Location: L thigh AVG: skin CDI. No s/s of infection. + B/T. No issues with cannulation or hemostasis.  AVG cleaned per p&p. 15gx2 needles secured/aspirated/NaCl flushed per p&p   Comments:                                        Labs   HBsAg (Antigen) / date: 11/18/2021  negative    - CC                                         HBsAb (Antibody) / date: 6/8/2021 Immune - PP   Source:    Obtained/Reviewed  Critical Results Called HGB   Date Value Ref Range Status   11/25/2021 8.1 (L) 12.1 - 17.0 g/dL Final     Potassium   Date Value Ref Range Status   11/24/2021 3.4 (L) 3.5 - 5.1 mmol/L Final     Calcium   Date Value Ref Range Status   11/24/2021 7.9 (L) 8.5 - 10.1 MG/DL Final     BUN   Date Value Ref Range Status   11/24/2021 11 6 - 20 MG/DL Final     Creatinine   Date Value Ref Range Status   11/24/2021 3.33 (H) 0.70 - 1.30 MG/DL Final        Meds Given   Name Dose Route   Albumin 12.5g/50mL x 2 HD               Adequacy / Fluid    Total Liters Process: 73.4   Net Fluid Removed: 500mL      Comments   Time Out Done:    8291   Admitting Diagnosis: Gangrene of left foot ([facility] [address]   Consent obtained/signed: Informed Consent Verified: Yes (11/25/21 0745)   Machine / RO # Machine Number: Barndi Wayne (11/25/21 0745)   Primary Nurse Rpt Pre: ABNER Martin, RN   Primary Nurse Rpt Post: CHRISTINE Robledo RN   Pt Education: Procedural   Care Plan: HD POC   Pts outpatient clinic: 3 [facility] [place] [facility]\""     Tx Summary * Call light on from 9872 - 9178 for albumin and towels for leak   Comments:          Floor notified regarding the nature of a leak on the sink               0745: Treatment initiated  1115: Tx ended. VSS. All possible blood returned to patient. Hemostasis achieved without issue. Bed locked and in the lowest position, call bell and belongings in reach. SBAR given to Primary, RN. Patient is stable at time of my departure.

## 2021-11-25 NOTE — DISCHARGE SUMMARY
Discharge Summary       PATIENT ID: Shiloh Montesinos MRN: [id number]   YOB: 1950    DATE OF ADMISSION: 11/13/2021  7:44 PM    DATE OF DISCHARGE: 11/25/2021    PRIMARY CARE PROVIDER: Daneen Burkitt, MD     ATTENDING PHYSICIAN: Remy Easton MD   DISCHARGING PROVIDER: Remy Easton MD    To contact this individual call [phone] and ask the  to page. If unavailable ask to be transferred the Adult Hospitalist Department. CONSULTATIONS: IP CONSULT TO NEPHROLOGY  IP CONSULT TO PODIATRY  IP CONSULT TO INTERVENTIONAL RADIOLOGY  IP CONSULT TO VASCULAR SURGERY  IP CONSULT TO CARDIOLOGY    PROCEDURES/SURGERIES: Procedure(s):  LEFT ABOVE KNEE AMPUTATION    ADMITTING DIAGNOSES & HOSPITAL COURSE:   Per Case Management: (Todd Vaca., ALESSANDROW)    TRANSITION OF CARE  RUR--18%  Disposition--To [facility]- initially for SNF rehab then transition return to [address] [place] [address]. Transport--BLS with [facility] scheduled for  11/25/21 at 12 noon. CM placed completed PCS on chart. Per Nephrology: (Ashly Dudley MD)    CC: Follow up for esrd            Assessment and Plan    ESRD- HD  - dialyzes TTS at 3 chopt   - Midodrine and Florinef prior to HD  - has Lft thigh AVG for access  - HD tomorrow per routine     Hypokalemia  - 4 k bath     Left foot Wet gangrene:   - s/p AKA 11/16     Anemia in CKD  Blood loss anemia   - f/u H/h  - continue SUNNY  -Transfuse PRN for Hb < 8     Sec HPTH   - continue home binders      Chronic Hypotension   - continue Midodrine and Florinef         per [address]:     Admission Summary:   Osman Vann Jr. is a 66 y. o. male with hx of orthostatic hypotension, ESRD on HD, Blindness, cad who presents to hospital from [facility] for evaluation of left foot infection. Pt states he has had pain in his left foot for the last month. Unable to describe any additional changes due to his poor eye-sight. States he has otherwise been doing and feeling well.   The patient denies any fever, chills, chest or abdominal pain, nausea, vomiting, cough, congestion, recent illness, palpitations, or dysuria. Remarkable vitals on ER Presentations: unremarkable  Labs Remarkable for: wbc 20.1, hgb 10.4, crp 18.90, esr 41,   ER Images: CT LLE: Suboptimal examination secondary to motion artifact. No definite bony  destruction is evident. Diffuse        Osteomyelitis,  Left Foot w/ Gagrene of LLE 3rd et 4th Digit (POA)   - S/PLeft AKA (11/16)  - Wound culture prior to AKA grew light mssa and heavy mixed enetric GNR. Blood cx negative. received vancomycin and Zosyn, discontinued. - Vascular surgery following   -WBC down.        Bleeding from the left AKA site  --Resolved.     Acute post op blood loss anemia on top of chronic anemia of chronic disease  Baseline HB~10  --HB stable, 7.8 today s/p 2 units PRBC,Transfuse for Hb<8 ,additional one unit ordered     ESRD on HD  -Nephro following for HD management   - Patient is TTS schedule      CAD  -Resumed Eliquis and antiplatelets, vascular surgery okay.     NIDDM II w/ Diabetic Neuropathy  -SSI + Hypoglycemic protocols        Orthostatic Hypotension likely worsened post due to acute blood loss. BP better this morning  -Blood pressure stable > 100, Home midrodine and fludrocortisone  -received a unit of blood. albumin as needed        On 11/20 hypokalemia,K 2. 5. Intermittently tachycardiac,frequent PVCs. Rhythm appears a flutter on monitor,frequent PCVs.  Treated with IV potassium  --Monitor and replete     Difficult access. left subclavian central line placed by IR on 11/19     Code status: Full   DVT prophylaxis:      Care Plan discussed with: Patient/Family and Nurse  Anticipated Disposition: rehab           DISCHARGE DIAGNOSES / PLAN:      1.  as above      ADDITIONAL CARE RECOMMENDATIONS:   na     PENDING TEST RESULTS:   At the time of discharge the following test results are still pending: na    FOLLOW UP APPOINTMENTS:    Follow-up Information     Follow up With Specialties Details Why Luis Antonio MONTES [facility] Bhupendra Ruiz   [address] [facility]  [phone]    Jackeline Arreola MD Internal Medicine   [address] [facility]  [address] [place] [address]  [phone]               DIET: Diabetic Diet     ACTIVITY: Activity as tolerated    WOUND CARE:   Per last wound care note: ASHLEY Johnson RN CWON)       Assessment:   Patient is A&O x 3, communicative and requires assist of 2 with repositioning. Bed: foam mattress  Patient wearing briefs for incontinence. Patient reports no pain. Patient repositioned on right side with pillow. Right Heel offloaded with pillows. Right Heel intact without erythema.     1. Right sacrum, partial thickness wound from moisture and friction: 1 x 1 x 0.1 cm  100% blanching red; scant serosang exudate; no odor. Periwound without erythema. 2.  Perineum red denudation from moisture. 3.  Right lateral foot, 100% black eschar: 3 x 2 x 0.1; centrally unroofed 1 x 1 x 0.1 cm 100% black eschar. 4.  Left medial thigh, partial thickness wound:  1.5 x 0.5 x 0.1 cm; 100% moist red; no odor or erythema. 5.  Right dorsal great toe, deflated bullae with dark base:  0.8 x 2 x 0 cm. 6.  Right medial foot, blanching red erythema with central hyperpigmentation.     Wound, Pressure Prevention & Skin Care Recommendations:    1. Minimize layers of linen/pads under patient to optimize support surface. 2.  Turn/reposition approximately every 2 hours and offload heels. 3.  Manage moisture/ Keep skin folds clean and dry. 4.  Sacrum, perineum and thigh:  Apply Zinc cream TID and with incontinence care. 5.   Right medial foot and heel:  Venelex TID and float on pillow. Do not use boot.   6.  Specialty Bed:  Prius ordered from [facility].  7.  Sacrum: sacral foam dressing.       EQUIPMENT needed: na      DISCHARGE MEDICATIONS:  Current Discharge Medication List      START taking these medications    Details   insulin regular (NOVOLIN R, HUMULIN R) 100 unit/mL injection INITIATE CORRECTIVE INSULIN PROTOCOL (IRVING):  RX IRVING Normal Sensitivity (Average weight)    AC (before meals), Q6H, and Q4H CORRECTIONAL SCALE only For Blood Sugar (mg/dl) of :             140-199=2 units            200-249=3 units  250-299=5 units  300-349=7 units  350 or greater = Call MD  Give in addition to basal medications. Do Not Hold for NPO    BEDTIME CORRECTIONAL sliding scale when scheduled:  200-249=2 units  250-299=3 units   300-349=4 units  350 or greater = Call MD  Give in addition to basal medications. Do Not Hold for NPO  Qty: 1 mL, Refills: 0  Start date: 11/25/2021         CONTINUE these medications which have CHANGED    Details   pregabalin (LYRICA) 25 mg capsule Take 1 Capsule by mouth daily. Max Daily Amount: 25 mg.  Qty: 30 Capsule, Refills: 0  Start date: 11/25/2021    Associated Diagnoses: Gangrene of left foot (HCC)      pantoprazole (Protonix) 20 mg tablet Take 1 Tablet by mouth daily. Qty: 30 Tablet, Refills: 0  Start date: 11/25/2021      midodrine (PROAMATINE) 10 mg tablet Take 2 Tablets by mouth every eight (8) hours for 30 days. Indications: a feeling of dizziness upon standing due to a drop in blood pressure  Qty: 180 Tablet, Refills: 0  Start date: 11/25/2021, End date: 12/25/2021      fludrocortisone (FLORINEF) 0.1 mg tablet Take 1 Tablet by mouth two (2) times a day. Qty: 60 Tablet, Refills: 0  Start date: 11/25/2021      clopidogreL (PLAVIX) 75 mg tab Take 1 Tablet by mouth daily. Qty: 30 Tablet, Refills: 0  Start date: 11/25/2021         CONTINUE these medications which have NOT CHANGED    Details   apixaban (ELIQUIS) 5 mg tablet Take 5 mg by mouth two (2) times a day. atorvastatin (LIPITOR) 10 mg tablet Take 20 mg by mouth nightly.          STOP taking these medications       loperamide (IMODIUM) 2 mg capsule Comments:   Reason for Stopping:         polyethylene glycol (Miralax) 17 gram/dose powder Comments:   Reason for Stopping:         oxyCODONE IR (ROXICODONE) 5 mg immediate release tablet Comments:   Reason for Stopping:         ergocalciferol, vitamin D2, (VITAMIN D2 PO) Comments:   Reason for Stopping:         mineral oil-isopropyl myristat (EUCERIN) lotion Comments:   Reason for Stopping:         glipiZIDE SR (Glucotrol XL) 5 mg CR tablet Comments:   Reason for Stopping:         tiZANidine (ZANAFLEX) 2 mg tablet Comments:   Reason for Stopping:         loperamide (IMODIUM) 2 mg capsule Comments:   Reason for Stopping:         POTASSIUM CHLORIDE PO Comments:   Reason for Stopping:         witch hazeL (Preparation H, witch hazel,) 20 % padm Comments:   Reason for Stopping:         sennosides (Senna) 8.6 mg cap Comments:   Reason for Stopping:         diclofenac (Voltaren) 1 % gel Comments:   Reason for Stopping:         pollen extracts (PROSTAT PO) Comments:   Reason for Stopping:         ascorbic acid, vitamin C, (Vitamin C) 500 mg tablet Comments:   Reason for Stopping:         ondansetron hcl (ZOFRAN) 4 mg tablet Comments:   Reason for Stopping:         phenyleph/pramoxin/glycr/w.pet (PREPARATION H MAXIMUM STRENGTH RE) Comments:   Reason for Stopping:         TUCKS, WITCH HAZEL, EX Comments:   Reason for Stopping:         acetaminophen (TYLENOL EXTRA STRENGTH) 500 mg tablet Comments:   Reason for Stopping:                 NOTIFY YOUR PHYSICIAN FOR ANY OF THE FOLLOWING:   Fever over 101 degrees for 24 hours. Chest pain, shortness of breath, fever, chills, nausea, vomiting, diarrhea, change in mentation, falling, weakness, bleeding. Severe pain or pain not relieved by medications. Or, any other signs or symptoms that you may have questions about.     DISPOSITION:    Home With:   OT  PT  HH  RN      x Long term SNF/Inpatient Rehab    Independent/assisted living    Hospice    Other:       PATIENT CONDITION AT DISCHARGE:     Functional status    Poor    x Deconditioned     Independent Cognition    x Lucid     Forgetful     Dementia      Catheters/lines (plus indication)    [place]     PICC     PEG    x None      Code status    x Full code     DNR      PHYSICAL EXAMINATION AT DISCHARGE:  Visit Vitals  BP (!) 123/93   Pulse (!) 101   Temp 97.5 °F (36.4 °C)   Resp 21   Ht 5' 9\" (1.753 m)   Wt 89.9 kg (198 lb 3.1 oz)   SpO2 92%   BMI 29.27 kg/m²        CHRONIC MEDICAL DIAGNOSES:  Problem List as of 11/25/2021 Date Reviewed: 11/25/2021          Codes Class Noted - Resolved    Gangrene of left foot ([address] ICD-10-CM: G25  ICD-9-CM: 785.4  11/14/2021 - Present        Hypertension ICD-10-CM: I10  ICD-9-CM: 401.9  4/15/2011 - Present        Renal failure ICD-10-CM: N19  ICD-9-CM: 863  4/15/2011 - Present        Diabetes mellitus ([facility] ICD-10-CM: E11.9  ICD-9-CM: 250.00  4/15/2011 - Present        RESOLVED: Status post placement of implantable loop recorder ICD-10-CM: Z95.818  ICD-9-CM: V45.09  9/1/2021 - 11/25/2021    Overview Signed 9/1/2021  8:35 AM by Mahamed Velasquez MD     9/1/2021 [employer] ILR                   Greater than 20  minutes were spent with the patient on counseling and coordination of care    Signed:   Jessenia Alaniz MD  11/25/2021  7:39 AM

## 2021-11-25 NOTE — PROGRESS NOTES
[facility]   [address] [facility], [address] [place] [address], [facility]  Phone: [phone]   [phone]       Nephrology Progress Note  Kelsi Valladares.     1950     [id number]  Date of Admission : 11/13/2021 11/25/21    CC: Follow up for esrd       Assessment and Plan   ESRD- HD  - dialyzes TTS at 3 chopt   - Midodrine and Florinef prior to HD  - has Lft thigh AVG for access  - HD today    Hypokalemia  - 4 k bath    Left foot Wet gangrene:   - s/p AKA 11/16    Anemia in CKD  Blood loss anemia   - f/u H/h  - continue SUNNY  -Transfuse PRN for Hb < 8    Sec HPTH   - continue home binders     Chronic Hypotension   - continue Midodrine and Florinef      Interval History:  Seen and examined on HD . Resting in bed. Stable BP. Pain controlled. Denies cp, sob, n/v/d    Review of Systems: A comprehensive review of systems was negative except for that written in the HPI.     Current Medications:   Current Facility-Administered Medications   Medication Dose Route Frequency    clopidogreL (PLAVIX) tablet 75 mg  75 mg Oral DAILY    apixaban (ELIQUIS) tablet 5 mg  5 mg Oral BID    0.9% sodium chloride infusion 250 mL  250 mL IntraVENous PRN    albumin human 25% (BUMINATE) solution 25 g  25 g IntraVENous QID PRN    balsam peru-castor oiL (VENELEX) ointment   Topical TID    oxyCODONE IR (ROXICODONE) tablet 10 mg  10 mg Oral Q4H PRN    0.9% sodium chloride infusion 250 mL  250 mL IntraVENous PRN    midodrine (PROAMATINE) tablet 20 mg  20 mg Oral Q8H    fludrocortisone (FLORINEF) tablet 0.1 mg  0.1 mg Oral BID    0.9% sodium chloride infusion 250 mL  250 mL IntraVENous PRN    gelatin sponge,absorb-porcine (GELFOAM) 100 sponge 1 Each  1 Each Topical PRN    fentaNYL citrate (PF) injection 50 mcg  50 mcg IntraVENous Q3H PRN    0.9% sodium chloride infusion 250 mL  250 mL IntraVENous PRN    traMADoL (ULTRAM) tablet 50 mg  50 mg Oral Q6H PRN    epoetin nabila-epbx (RETACRIT) injection 20,000 Units 20,000 Units SubCUTAneous Q TUE, THU & SAT    atorvastatin (LIPITOR) tablet 20 mg  20 mg Oral QHS    pantoprazole (PROTONIX) tablet 20 mg  20 mg Oral DAILY    pregabalin (LYRICA) capsule 25 mg  25 mg Oral DAILY    [Held by provider] tiZANidine (ZANAFLEX) tablet 2 mg  2 mg Oral BID    insulin regular (NOVOLIN R, HUMULIN R) injection   SubCUTAneous TIDAC    glucose chewable tablet 16 g  4 Tablet Oral PRN    dextrose (D50W) injection syrg 12.5-25 g  12.5-25 g IntraVENous PRN    glucagon (GLUCAGEN) injection 1 mg  1 mg IntraMUSCular PRN      Allergies   Allergen Reactions    Adhesive Tape-Silicones Itching     Itchiness        Objective:  Vitals:    Vitals:    11/25/21 1030 11/25/21 1045 11/25/21 1100 11/25/21 1115   BP: 94/69 130/68 (!) 122/49 (!) 129/103   Pulse: 97 87 87 96   Resp: 18 16 14 18   Temp:    97.8 °F (36.6 °C)   TempSrc:       SpO2:       Weight:       Height:         Intake and Output:  11/25 0701 - 11/25 1900  In: -   Out: 500   No intake/output data recorded. Physical Examination:  GEN: NAD,Blind  NECK- Supple, no mass  RESP: No wheezing,CLEAR b/l  CVS: S1,S2  RRR  NEURO: Normal speech, NON FOCAL  EXT: Left AKA, bleeding stump    []    High complexity decision making was performed  []    Patient is at high-risk of decompensation with multiple organ involvement    Lab Data Personally Reviewed: I have reviewed all the pertinent labs, microbiology data and radiology studies during assessment.     Recent Labs     11/25/21  0624 11/25/21  0623 11/24/21  0157 11/24/21  0156 11/23/21  0324    139 140 139 140   K 3.2* 3.2* 3.4* 3.4* 3.3*    107 106 105 106   CO2 26 25 29 30 28   * 225* 182* 185* 114*   BUN 19 19 11 12 15   CREA 4.49* 4.47* 3.33* 3.34* 4.48*   CA 8.0* 8.0* 7.9* 8.1* 8.1*   PHOS 2.6  --  2.3*  --   --    ALB 1.5*  --  1.7*  --   --      Recent Labs     11/25/21  0624 11/25/21  0623 11/24/21  0157 11/23/21  0324   WBC 12.4*  --  11.8* 15.5*   HGB 8.1* 8.1* 7.9* 7.7* HCT 27.6* 27.4* 26.5* 26.5*     --  302 292     No results found for: SDES  Lab Results   Component Value Date/Time    Culture result: LIGHT STAPHYLOCOCCUS AUREUS (A) 11/14/2021 01:54 AM    Culture result: HEAVY MIXED ENTERIC GRAM NEGATIVE RODS (A) 11/14/2021 01:54 AM    Culture result: NO GROWTH 5 DAYS 11/13/2021 11:55 PM     Recent Results (from the past 24 hour(s))   GLUCOSE, POC    Collection Time: 11/24/21 12:38 PM   Result Value Ref Range    Glucose (POC) 189 (H) 65 - 117 mg/dL    Performed by Srinivasa Del Angel    GLUCOSE, POC    Collection Time: 11/24/21  4:34 PM   Result Value Ref Range    Glucose (POC) 199 (H) 65 - 117 mg/dL    Performed by Srinivasa Del Angel    GLUCOSE, POC    Collection Time: 11/24/21  5:44 PM   Result Value Ref Range    Glucose (POC) 187 (H) 65 - 117 mg/dL    Performed by Sherlyn Whitlock    HGB & HCT    Collection Time: 11/25/21  6:23 AM   Result Value Ref Range    HGB 8.1 (L) 12.1 - 17.0 g/dL    HCT 27.4 (L) 36.6 - 79.5 %   METABOLIC PANEL, BASIC    Collection Time: 11/25/21  6:23 AM   Result Value Ref Range    Sodium 139 136 - 145 mmol/L    Potassium 3.2 (L) 3.5 - 5.1 mmol/L    Chloride 107 97 - 108 mmol/L    CO2 25 21 - 32 mmol/L    Anion gap 7 5 - 15 mmol/L    Glucose 225 (H) 65 - 100 mg/dL    BUN 19 6 - 20 MG/DL    Creatinine 4.47 (H) 0.70 - 1.30 MG/DL    BUN/Creatinine ratio 4 (L) 12 - 20      GFR est AA 16 (L) >60 ml/min/1.73m2    GFR est non-AA 13 (L) >60 ml/min/1.73m2    Calcium 8.0 (L) 8.5 - 10.1 MG/DL   RENAL FUNCTION PANEL    Collection Time: 11/25/21  6:24 AM   Result Value Ref Range    Sodium 140 136 - 145 mmol/L    Potassium 3.2 (L) 3.5 - 5.1 mmol/L    Chloride 107 97 - 108 mmol/L    CO2 26 21 - 32 mmol/L    Anion gap 7 5 - 15 mmol/L    Glucose 230 (H) 65 - 100 mg/dL    BUN 19 6 - 20 MG/DL    Creatinine 4.49 (H) 0.70 - 1.30 MG/DL    BUN/Creatinine ratio 4 (L) 12 - 20      GFR est AA 16 (L) >60 ml/min/1.73m2    GFR est non-AA 13 (L) >60 ml/min/1.73m2    Calcium 8.0 (L) 8.5 - 10.1 MG/DL    Phosphorus 2.6 2.6 - 4.7 MG/DL    Albumin 1.5 (L) 3.5 - 5.0 g/dL   CBC WITH AUTOMATED DIFF    Collection Time: 11/25/21  6:24 AM   Result Value Ref Range    WBC 12.4 (H) 4.1 - 11.1 K/uL    RBC 2.75 (L) 4.10 - 5.70 M/uL    HGB 8.1 (L) 12.1 - 17.0 g/dL    HCT 27.6 (L) 36.6 - 50.3 %    .4 (H) 80.0 - 99.0 FL    MCH 29.5 26.0 - 34.0 PG    MCHC 29.3 (L) 30.0 - 36.5 g/dL    RDW 17.8 (H) 11.5 - 14.5 %    PLATELET 584 321 - 770 K/uL    MPV 9.7 8.9 - 12.9 FL    NRBC 1.5 (H) 0  WBC    ABSOLUTE NRBC 0.18 (H) 0.00 - 0.01 K/uL    NEUTROPHILS 78 (H) 32 - 75 %    LYMPHOCYTES 11 (L) 12 - 49 %    MONOCYTES 3 (L) 5 - 13 %    EOSINOPHILS 4 0 - 7 %    BASOPHILS 0 0 - 1 %    METAMYELOCYTES 1 (H) 0 %    MYELOCYTES 3 (H) 0 %    IMMATURE GRANULOCYTES 0 %    ABS. NEUTROPHILS 9.7 (H) 1.8 - 8.0 K/UL    ABS. LYMPHOCYTES 1.4 0.8 - 3.5 K/UL    ABS. MONOCYTES 0.4 0.0 - 1.0 K/UL    ABS. EOSINOPHILS 0.5 (H) 0.0 - 0.4 K/UL    ABS. BASOPHILS 0.0 0.0 - 0.1 K/UL    ABS. IMM. GRANS. 0.0 K/UL    DF MANUAL      RBC COMMENTS ANISOCYTOSIS  1+        RBC COMMENTS MACROCYTOSIS  1+       GLUCOSE, POC    Collection Time: 11/25/21  6:29 AM   Result Value Ref Range    Glucose (POC) 275 (H) 65 - 117 mg/dL    Performed by Carmen sanabria RN    GLUCOSE, POC    Collection Time: 11/25/21  8:02 AM   Result Value Ref Range    Glucose (POC) 229 (H) 65 - 117 mg/dL    Performed by Patricia Chauhan    GLUCOSE, POC    Collection Time: 11/25/21 10:08 AM   Result Value Ref Range    Glucose (POC) 205 (H) 65 - 117 mg/dL    Performed by Yaneth Velez            Total time spent with patient:  xxx   min. Care Plan discussed with:  Patient     Family      RN      Consulting Physician [facility],         I have reviewed the flowsheets. Chart and Pertinent Notes have been reviewed. No change in PMH ,family and social history from Consult note.       Lore Urena MD

## 2021-11-26 NOTE — PROGRESS NOTES
Transition of care outreach postponed for 14 days due to patient's discharge to SNF.  11/13-11/25/21 STM gangrene of Lt foot D/C [facility] f/u 14d

## 2021-12-01 ENCOUNTER — PATIENT OUTREACH (OUTPATIENT)
Dept: CASE MANAGEMENT | Age: 71
End: 2021-12-01

## 2021-12-01 NOTE — Clinical Note
Good afternoon,    Mr. Nicole Douglass passed away at [facility] on 11/30.     Thanks,   LILI Lux

## 2022-06-10 NOTE — TELEPHONE ENCOUNTER
Spoke with Corinne at [facility]. Informed her that  needs patient to have EMG done before follow up. EMG scheduled for 2/26. Follow up scheduled for 3/11. Patient/Caregiver provided printed discharge information.

## (undated) DEVICE — SPONGE LAPAROTOMY W18XL18IN WHITE STRUNG RADIOPAQUE STERILE

## (undated) DEVICE — DRESSING TRNSPAR 5IN LEN 4IN W FLM ST BIOCL

## (undated) DEVICE — GOWN,SIRUS,NONRNF,SETINSLV,2XL,18/CS: Brand: MEDLINE

## (undated) DEVICE — 450 ML BOTTLE OF 0.05% CHLORHEXIDINE GLUCONATE IN 99.95% STERILE WATER FOR IRRIGATION, USP AND APPLICATOR.: Brand: IRRISEPT ANTIMICROBIAL WOUND LAVAGE

## (undated) DEVICE — PACK PROC PACEMAKER  LF

## (undated) DEVICE — 3M™ TEGADERM™ TRANSPARENT FILM DRESSING FRAME STYLE, 1626W, 4 IN X 4-3/4 IN (10 CM X 12 CM), 50/CT 4CT/CASE: Brand: 3M™ TEGADERM™

## (undated) DEVICE — PREP SKN CHLRAPRP APL 26ML STR --

## (undated) DEVICE — STERILE POLYISOPRENE POWDER-FREE SURGICAL GLOVES: Brand: PROTEXIS

## (undated) DEVICE — STERILE POLYISOPRENE POWDER-FREE SURGICAL GLOVES WITH EMOLLIENT COATING: Brand: PROTEXIS

## (undated) DEVICE — AGENT HEMSTAT W4XL4IN OXIDIZED REGENERATED CELOS ABSRB SFT

## (undated) DEVICE — LOOP,VESSEL,MAXI,BLUE,2/PK,STERILE: Brand: MEDLINE

## (undated) DEVICE — EXTREMITY - SMH: Brand: MEDLINE INDUSTRIES, INC.

## (undated) DEVICE — STAPLER SKIN H3.9MM WIRE DIA0.58MM CRWN 6.9MM 35 STPL ROT

## (undated) DEVICE — SUTURE PERMAHAND SZ 3-0 L30IN NONABSORBABLE BLK SILK BRAID A304H

## (undated) DEVICE — NEEDLE HYPO 18GA L1.5IN PNK S STL HUB POLYPR SHLD REG BVL

## (undated) DEVICE — SPONGE GZ W4XL4IN COT 12 PLY TYP VII WVN C FLD DSGN

## (undated) DEVICE — BANDAGE COBAN 4 IN COMPR W4INXL5YD FOAM COHESIVE QUIK STK SELF ADH SFT

## (undated) DEVICE — Device

## (undated) DEVICE — SUTURE PERMAHAND SZ 2-0 L30IN NONABSORBABLE BLK SILK W/O A305H

## (undated) DEVICE — SUTURE VCRL SZ 3-0 L27IN ABSRB UD L26MM SH 1/2 CIR J416H

## (undated) DEVICE — REM POLYHESIVE ADULT PATIENT RETURN ELECTRODE: Brand: VALLEYLAB

## (undated) DEVICE — SUTURE PROL SZ 6-0 L24IN NONABSORBABLE BLU L9.3MM BV-1 VISI 8305H

## (undated) DEVICE — SUTURE VCRL SZ 2-0 L36IN ABSRB UD L40MM CT 1/2 CIR J957H

## (undated) DEVICE — 2108 SERIES SAGITTAL BLADE (18.6 X 0.8 X 73.8MM)

## (undated) DEVICE — SUTURE VCRL SZ 2-0 L36IN ABSRB VLT L36MM CT-1 1/2 CIR J345H

## (undated) DEVICE — FOGARTY ARTERIAL EMBOLECTOMY CATHETER 4F 80CM: Brand: FOGARTY

## (undated) DEVICE — ZIMMER® STERILE DISPOSABLE TOURNIQUET CUFF WITH PLC, DUAL PORT, SINGLE BLADDER, 24 IN. (61 CM)

## (undated) DEVICE — COVER,MAYO STAND,STERILE: Brand: MEDLINE

## (undated) DEVICE — LABEL MED CARD MRMC STRL

## (undated) DEVICE — SOLUTION IV 1000ML 0.9% SOD CHL

## (undated) DEVICE — FOGARTY ARTERIAL EMBOLECTOMY CATHETER 3F 80CM: Brand: FOGARTY

## (undated) DEVICE — PAD,ABDOMINAL,5"X9",ST,LF,25/BX: Brand: MEDLINE INDUSTRIES, INC.

## (undated) DEVICE — PADDING CST 4INX4YD --

## (undated) DEVICE — KIT,1200CC CANISTER,3/16"X6' TUBING: Brand: MEDLINE INDUSTRIES, INC.

## (undated) DEVICE — SOL INJ NACL 0.9% 500ML --

## (undated) DEVICE — SYR 3ML LL TIP 1/10ML GRAD --

## (undated) DEVICE — DRAPE XR C ARM 41X74IN LF --

## (undated) DEVICE — TAPE,ELASTIC,FOAM,CURAD,3"X5.5YD,LF: Brand: CURAD

## (undated) DEVICE — GLOVE SURG SZ 7 L12IN FNGR THK79MIL GRN LTX FREE

## (undated) DEVICE — SPONGE LAP 18X18IN STRL -- 5/PK

## (undated) DEVICE — SOLUTION IV 500ML 0.9% SOD CHL FLX CONT

## (undated) DEVICE — KENDALL SCD EXPRESS SLEEVES, KNEE LENGTH, MEDIUM: Brand: KENDALL SCD

## (undated) DEVICE — BNDG ELAS HK LOOP 4X5YD NS -- MATRIX

## (undated) DEVICE — STOCKINETTE,IMPERVIOUS,12X48,STERILE: Brand: MEDLINE

## (undated) DEVICE — INFECTION CONTROL KIT SYS

## (undated) DEVICE — STRAP,POSITIONING,KNEE/BODY,FOAM,4X60": Brand: MEDLINE

## (undated) DEVICE — PROBE VASC 8MHZ WTRPRF

## (undated) DEVICE — DRESSING PETRO W3XL8IN N ADH OIL EMUL GZ CURAD

## (undated) DEVICE — PTA BALLOON DILATATION CATHETER: Brand: MUSTANG™

## (undated) DEVICE — INFLATION DEVICE: Brand: ENCORE™ 26

## (undated) DEVICE — GLOVE ORANGE PI 7   MSG9070

## (undated) DEVICE — DRAPE,REIN 53X77,STERILE: Brand: MEDLINE

## (undated) DEVICE — SUTURE VCRL SZ 3-0 L27IN ABSRB UD FS-2 L19MM 1/2 CIR J423H

## (undated) DEVICE — SUT ETHLN 4-0 18IN PS2 BLK --

## (undated) DEVICE — BANDAGE,GAUZE,BULKEE II,4.5"X4.1YD,STRL: Brand: MEDLINE

## (undated) DEVICE — GOWN,SIRUS,FABRNF,XL,20/CS: Brand: MEDLINE

## (undated) DEVICE — SUTURE NONABSORBABLE MONOFILAMENT 4-0 CV-5 PT-13 24 IN GORTX 5K08B

## (undated) DEVICE — BLADE ASSEMB CLP HAIR FINE --

## (undated) DEVICE — SUTURE PROL SZ 5-0 L24IN NONABSORBABLE BLU RB-2 L13IN 1/2 8554H

## (undated) DEVICE — GOWN,NON-REINFORCED,XXL: Brand: MEDLINE

## (undated) DEVICE — TOWEL SURG W17XL27IN STD BLU COT NONFENESTRATED PREWASHED

## (undated) DEVICE — SLIM BODY SKIN STAPLER: Brand: APPOSE ULC

## (undated) DEVICE — HANDLE LT SNAP ON ULT DURABLE LENS FOR TRUMPF ALC DISPOSABLE

## (undated) DEVICE — (D)PREP SKN CHLRAPRP APPL 26ML -- CONVERT TO ITEM 371833

## (undated) DEVICE — SUT PROL 6-0 24IN BV1 DA BLU --

## (undated) DEVICE — SOLUTION IRRIG 1000ML H2O STRL BLT

## (undated) DEVICE — SYRINGE ANGIO CNTRST DEL 20 CC POLYCARB LIGHT GRN MEDALLION

## (undated) DEVICE — RADIFOCUS GLIDEWIRE: Brand: GLIDEWIRE

## (undated) DEVICE — PENCIL SMK EVAC 10 FT BLADE ELECTRD ROCKER FOR TELSCP

## (undated) DEVICE — GAUZE SPNG XRAY 4X4IN 16PLY -- STRL

## (undated) DEVICE — SHEAR RMFG HARMONIC FOCUS 9CM -- OEM ITEM L#322125

## (undated) DEVICE — SUTURE PERMAHAND SZ 0 L30IN NONABSORBABLE BLK SILK BRAID A306H

## (undated) DEVICE — GAUZE SPONGES,12 PLY: Brand: CURITY

## (undated) DEVICE — Z CONVERTED USE 2276060 DRESSING NONADHESIVE W3XL4IN WHT COT OUCHLSS RECT BONDED

## (undated) DEVICE — DERMABOND SKIN ADH 0.7ML -- DERMABOND ADVANCED 12/BX

## (undated) DEVICE — FOGARTY THRU-LUMEN EMBOLECTOMY CATHETER 5.5F 40CM: Brand: FOGARTY

## (undated) DEVICE — SUTURE PROL 5-0 L18IN NONABSORBABLE BLU RB-2 L13MM 1/2 CIR 8713H

## (undated) DEVICE — DEVON™ KNEE AND BODY STRAP 60" X 3" (1.5 M X 7.6 CM): Brand: DEVON

## (undated) DEVICE — COVER,TABLE,60X90,STERILE: Brand: MEDLINE

## (undated) DEVICE — SUTURE PROL SZ 6-0 L24IN NONABSORBABLE BLU L9.3MM BV-1 3/8 8805H